# Patient Record
Sex: MALE | Race: WHITE | NOT HISPANIC OR LATINO | Employment: OTHER | URBAN - NONMETROPOLITAN AREA
[De-identification: names, ages, dates, MRNs, and addresses within clinical notes are randomized per-mention and may not be internally consistent; named-entity substitution may affect disease eponyms.]

---

## 2017-01-12 ENCOUNTER — OFFICE VISIT (OUTPATIENT)
Dept: CARDIAC SURGERY | Facility: CLINIC | Age: 82
End: 2017-01-12

## 2017-01-12 VITALS
SYSTOLIC BLOOD PRESSURE: 177 MMHG | HEART RATE: 88 BPM | DIASTOLIC BLOOD PRESSURE: 95 MMHG | BODY MASS INDEX: 25.46 KG/M2 | TEMPERATURE: 97.6 F | OXYGEN SATURATION: 99 % | WEIGHT: 168 LBS | HEIGHT: 68 IN

## 2017-01-12 DIAGNOSIS — Z09 FOLLOW-UP SURGERY CARE: ICD-10-CM

## 2017-01-12 DIAGNOSIS — F17.218 NICOTINE DEPENDENCE, CIGARETTES, WITH OTHER NICOTINE-INDUCED DISORDERS: Primary | ICD-10-CM

## 2017-01-12 DIAGNOSIS — I65.23 CAROTID STENOSIS, BILATERAL: ICD-10-CM

## 2017-01-12 PROCEDURE — 99024 POSTOP FOLLOW-UP VISIT: CPT | Performed by: NURSE PRACTITIONER

## 2017-01-12 NOTE — MR AVS SNAPSHOT
Maciej Funk   1/12/2017 9:40 AM   Office Visit    Dept Phone:  [phone]   Encounter #:  [id number]    Provider:  CHRISTINA Denny   Department:  [facility] CARDIOTHORACIC AND VASCULAR SURGERY                Your Full Care Plan              Today's Medication Changes          These changes are accurate as of: 1/12/17  9:59 AM.  If you have any questions, ask your nurse or doctor.               Stop taking medication(s)listed here:     lidocaine-hydrocortisone 3-0.5 % cream rectal cream   Stopped by:  CHRISTINA Denny                      Your Updated Medication List          This list is accurate as of: 1/12/17  9:59 AM.  Always use your most recent med list.                aspirin 81 MG chewable tablet       clopidogrel 75 MG tablet   Commonly known as:  PLAVIX   Take 1 tablet by mouth Daily.       latanoprost 0.005 % ophthalmic solution   Commonly known as:  XALATAN       lisinopril 20 MG tablet   Commonly known as:  PRINIVIL,ZESTRIL   Take 1 tablet by mouth Daily.       simvastatin 20 MG tablet   Commonly known as:  ZOCOR   Take 1 tablet by mouth Every Night. Bedtime       VISION-HECTOR PRESERVE PO               You Were Diagnosed With        Codes Comments    Nicotine dependence, cigarettes, with other nicotine-induced disorders    -  Primary ICD-10-CM: F17.218  ICD-9-CM: 292.89     Carotid stenosis, bilateral     ICD-10-CM: I65.23  ICD-9-CM: 433.10, 433.30     Follow-up surgery care     ICD-10-CM: Z09  ICD-9-CM: V67.00       Instructions    If you should experience any neurological symptoms including but not limited to visual or speech disturbances confusion, seizures, or weakness of limbs of one side of your body notify Heart and Vascular center immediately for evaluation or if after hours present to the nearest Emergency Department.      Clean operative site with antibacterial soap/water, pat dry. Keep open to air unless draining, then may  apply dry dressing.  No ointments or creams unless prescribed by provider.     Signs and symptoms of infection including drainage from operative site, redness, swelling, with associated fever and/or chills notify Heart and Vascular center immediately for wound check.      Smoking cessation assistance options offered including behavioral counseling (Smoking Cessation Classes), Nicotine replacement therapy (patches or gum), pharmacologic therapy (Chantix, Wellbutrin).  Discussion and question answer period 5-7 minutes.     MED MGMT: Continue ASA,PLAVIX,STATIN    RETURN 6 WEEKS WITH DUPLEX     Patient Instructions History      Upcoming Appointments     Visit Type Date Time Department    FOLLOW UP 1/12/2017  9:40 AM MGW CT VAS SURGERY [facility] VASCULAR [facility] 2/27/2017  1:30 PM MGW CT VAS SURGERY [facility]    FOLLOW UP 2/27/2017  2:20 PM MGW CT VAS SURGERY [facility]    PHYSICAL 10/5/2017 11:00 AM [facility] FAM MED [facility] 4TH    OFFICE VISIT 1/4/2018  1:00 PM [facility] OPHTHALMOLOGY [facility]      MyChart Signup     Our records indicate that you have declined [facility] myTomorrowst signup. If you would like to sign up for [employer], please email [email or web address] [email or web address] or call [phone] to obtain an activation code.             Other Info from Your Visit           Your Appointments     Feb 27, 2017  1:30 PM CST   VASCULAR ULTRASOUND VISIT with K[facility] VAS ROOM   [facility] CARDIOTHORACIC AND VASCULAR SURGERY (--)    [address] [place] [address] [address]  [address] [place] [id number] [phone]            Feb 27, 2017  2:20 PM CST   Follow Up with Lan Begum MD   [facility] CARDIOTHORACIC AND VASCULAR SURGERY (--)    [address] [place] [address] [address]  [address] [place] [id number] [phone]           Arrive 15 minutes prior to appointment.            Oct 05, 2017 11:00 AM CDT   Physical with Debbi Jackson MD   [facility] FAMILY MEDICINE (--)    [address] [place] [address] Dr  Medical  "[address] [facility] [address] [phone]           Arrive 15 minutes prior to appointment.            Jan 04, 2018  1:00 PM CST   Office Visit with Jamison Polk MD   [facility] OPHTHALMOLOGY (--)    [address] [place] [address] Dr  [address] [facility] [address] [phone]           Arrive 15 minutes prior to appointment.              Allergies     No Known Allergies      Reason for Visit     Carotid Artery Disease 1 week follow up (1/4/17) Left CEA      Vital Signs     Blood Pressure Pulse Temperature Height Weight Oxygen Saturation    177/95 (BP Location: Left arm) 88 97.6 °F (36.4 °C) (Oral) 68\" (172.7 cm) 168 lb (76.2 kg) 99%    Body Mass Index Smoking Status                25.54 kg/m2 Former Smoker          Problems and Diagnoses Noted     Narrowing of artery in neck    Encounter for examination following surgery    Nicotine dependence, cigarettes, with other nicotine-induced disorders        "

## 2017-01-12 NOTE — PATIENT INSTRUCTIONS
If you should experience any neurological symptoms including but not limited to visual or speech disturbances confusion, seizures, or weakness of limbs of one side of your body notify Heart and Vascular center immediately for evaluation or if after hours present to the nearest Emergency Department.      Clean operative site with antibacterial soap/water, pat dry. Keep open to air unless draining, then may apply dry dressing.  No ointments or creams unless prescribed by provider.     Signs and symptoms of infection including drainage from operative site, redness, swelling, with associated fever and/or chills notify Heart and Vascular center immediately for wound check.      Smoking cessation assistance options offered including behavioral counseling (Smoking Cessation Classes), Nicotine replacement therapy (patches or gum), pharmacologic therapy (Chantix, Wellbutrin).  Discussion and question answer period 5-7 minutes.     MED MGMT: Continue ASA,PLAVIX,STATIN    RETURN 6 WEEKS WITH DUPLEX

## 2017-01-13 NOTE — PROGRESS NOTES
Subjective   Patient ID: Maciej Funk is a 81 y.o. male is here today for follow-up CAROTID STENOSIS, LCEA.    History of Present Illness  The following portions of the patient's history were reviewed and updated as appropriate: allergies, current medications, past family history, past medical history, past social history, past surgical history and problem list.  PCP:  Debbi Jackson MD    81 y.o. male with HTN, AAA, COPD, dyslipidemia, carotid stensosi.  smokes 1 PPD.  Bilateral carotid bruits noted on exam.  Unable to see out of LEFT eye x years.  Mild dizziness occasional x years.  Activity level good, mows yard, rakes leaves.  Plavix started for severe carotid stenosis on duplex imaging.  No other associated symptoms or modifying factors.    11/9/2016 Carotid Duplex:  AISHA 50-79% (127cm/s), antegrade vert.  LICA 80-99% (522cm/s), antegrade vert.  12/9/2016 CTACarotids:  AISHA 50%, LICA 95%  1/4/17: LEFT CEA    Current Outpatient Prescriptions:   •  aspirin 81 MG chewable tablet, Chew 81 mg Daily., Disp: , Rfl:   •  clopidogrel (PLAVIX) 75 MG tablet, Take 1 tablet by mouth Daily., Disp: 30 tablet, Rfl: 5  •  latanoprost (XALATAN) 0.005 % ophthalmic solution, Administer 1 drop into the left eye every night. Bedtime, Disp: , Rfl:   •  lisinopril (PRINIVIL,ZESTRIL) 20 MG tablet, Take 1 tablet by mouth Daily., Disp: 90 tablet, Rfl: 3  •  Multiple Vitamins-Minerals (VISION-HECTOR PRESERVE PO), Take 1 capsule by mouth daily. PreserVision Lutein 226 mg-200 unit-5 mg-0.8 mg capsule, Disp: , Rfl:   •  simvastatin (ZOCOR) 20 MG tablet, Take 1 tablet by mouth Every Night. Bedtime, Disp: 90 tablet, Rfl: 3    Review of Systems   Constitution: Negative for fever and weakness.   HENT: Negative for hoarse voice and odynophagia.    Eyes: Negative for visual disturbance.   Cardiovascular: Negative for dyspnea on exertion and leg swelling.   Respiratory: Negative for shortness of breath.    Skin: Negative for poor wound  healing.   Musculoskeletal: Negative for falls.   Gastrointestinal: Negative for abdominal pain and constipation.   Genitourinary: Negative for dysuria.   Neurological: Negative for focal weakness and light-headedness.   All other systems reviewed and are negative.       Objective   Physical Exam   Constitutional: He is oriented to person, place, and time. He appears well-developed.   HENT:   Head: Normocephalic.   Eyes: Pupils are equal, round, and reactive to light.   Neck: Normal range of motion. Carotid bruit is not present.   Cardiovascular: Normal rate.    Pulmonary/Chest: Effort normal.   Abdominal: Soft. Bowel sounds are normal. He exhibits no distension.   Musculoskeletal: Normal range of motion.   Neurological: He is alert and oriented to person, place, and time. He has normal strength. No cranial nerve deficit.   Skin: Skin is warm and dry. No erythema.   LEFT neck incision: CDI, Well approximated.   Vitals reviewed.     Admission on 01/04/2017, Discharged on 01/05/2017   Component Date Value Ref Range Status   • Sodium 12/30/2016 143  137 - 145 mmol/L Final   • Potassium 12/30/2016 4.0  3.5 - 5.1 mmol/L Final   • Chloride 12/30/2016 103  95 - 110 mmol/L Final   • CO2 12/30/2016 28  22 - 31 mmol/L Final   • Anion Gap 12/30/2016 12.0  5.0 - 15.0 mmol/L Final   • Glucose 12/30/2016 91  60 - 100 mg/dl Final   • BUN 12/30/2016 23* 7 - 21 mg/dl Final   • Creatinine 12/30/2016 1.1  0.7 - 1.3 mg/dl Final   • GFR MDRD Non  12/30/2016 65  42 - 98 mL/min/1.73 sq.M Final    Comment: Invalid if creatinine is changing or the patient is on dialysis. Use AA  result if patient is -American, non AA result otherwise.     • GFR MDRD  12/30/2016 78  42 - 98 mL/min/1.73 sq.M Final   • Calcium 12/30/2016 9.0  8.4 - 10.2 mg/dl Final   • Total Protein 12/30/2016 7.1  6.3 - 8.6 gm/dl Final   • Albumin 12/30/2016 3.8  3.4 - 4.8 gm/dl Final   • Total Bilirubin 12/30/2016 1.0  0.2 - 1.3 mg/dl  Final   • Alkaline Phosphatase 12/30/2016 68  38 - 126 U/L Final   • AST (SGOT) 12/30/2016 20  17 - 59 U/L Final   • ALT (SGPT) 12/30/2016 40  21 - 72 U/L Final   • Protime 12/30/2016 13.4  11.1 - 15.3 seconds Final   • INR 12/30/2016 1.0  0.8 - 1.2 Final    Comment: Therapeutic range for most indications is 2.0 - 3.0 INR or 2.5 - 3.5 for  mechanical   heart valves.     • PTT 12/30/2016 35.4  20.0 - 40.3 seconds Final    The recommended Heparin therapeutic range is 68 - 97 seconds.   • Prealbumin 12/30/2016 28.1  17.6 - 36.0 mg/dl Final   • DOES A PREVIOUS ABORH EXIST? 12/30/2016 PATIENT NEEDS ABO/RH CONF ON DAY OF SURGERY   Final   • ABO Type 12/30/2016 A   Final   • RH type 12/30/2016 POS   Final   • Antibody Screen Interpretation 12/30/2016 NEG   Final   • DOES A PREVIOUS ABORH EXIST? 01/04/2017 PREVIOUS TYPE ON FILE   Final   • ABO Type 01/04/2017 A   Final   • RH type 01/04/2017 POS   Final   • Antibody Screen Interpretation 01/04/2017 NEG   Final   • Spec Descr 1 01/04/2017 SPECIMEN(S): A PLAQUE   Final         Assessment/Plan   Independent Review of Radiographic Studies:    Detailed discussion regarding risks, benefits, and treatment plan.  Patient understands, agrees, and wishes to proceed with plan.  Progressing well.     1. Nicotine dependence, cigarettes, with other nicotine-induced disorders  Smoking cessation assistance options offered including behavioral counseling (Smoking Cessation Classes), Nicotine replacement therapy (patches or gum), pharmacologic therapy (Chantix, Wellbutrin).  Discussion and question answer period 5-7 minutes.     2. Carotid stenosis, bilateral  Neurovasc status stable s/p LCEA.   - Duplex Carotid - Left Ultrasound CAR; Future    3. Follow-up surgery care  Signs and symptoms of infection including drainage from operative site, redness, swelling, with associated fever and/or chills notify Heart and Vascular center immediately for wound check.    Clean operative site with  antibacterial soap/water, pat dry. Keep open to air unless draining, then may apply dry dressing.  No ointments or creams unless prescribed by provider.   May drive. Return 6 weeks.   - Duplex Carotid - Left Ultrasound CAR; Future (6weeks)

## 2017-02-01 RX ORDER — LATANOPROST 50 UG/ML
SOLUTION/ DROPS OPHTHALMIC
Qty: 2.5 ML | Refills: 6 | Status: SHIPPED | OUTPATIENT
Start: 2017-02-01 | End: 2018-01-30 | Stop reason: SDUPTHER

## 2017-02-27 ENCOUNTER — OFFICE VISIT (OUTPATIENT)
Dept: CARDIAC SURGERY | Facility: CLINIC | Age: 82
End: 2017-02-27

## 2017-02-27 VITALS
BODY MASS INDEX: 25.9 KG/M2 | HEART RATE: 75 BPM | SYSTOLIC BLOOD PRESSURE: 122 MMHG | HEIGHT: 67 IN | WEIGHT: 165 LBS | DIASTOLIC BLOOD PRESSURE: 74 MMHG | OXYGEN SATURATION: 99 %

## 2017-02-27 DIAGNOSIS — I65.23 CAROTID STENOSIS, BILATERAL: Primary | ICD-10-CM

## 2017-02-27 DIAGNOSIS — I71.40 ABDOMINAL AORTIC ANEURYSM (AAA) WITHOUT RUPTURE (HCC): ICD-10-CM

## 2017-02-27 DIAGNOSIS — I10 ESSENTIAL HYPERTENSION: ICD-10-CM

## 2017-02-27 DIAGNOSIS — E78.2 MIXED HYPERLIPIDEMIA: ICD-10-CM

## 2017-02-27 PROCEDURE — 99406 BEHAV CHNG SMOKING 3-10 MIN: CPT | Performed by: THORACIC SURGERY (CARDIOTHORACIC VASCULAR SURGERY)

## 2017-02-27 PROCEDURE — 99024 POSTOP FOLLOW-UP VISIT: CPT | Performed by: THORACIC SURGERY (CARDIOTHORACIC VASCULAR SURGERY)

## 2017-03-04 NOTE — PROGRESS NOTES
2/27/2017    Maciej Arauzn Blessing  1935      Chief Complaint:  Carotid Stenosis  HPI:      PCP:  Debbi Jackson MD    81 y.o. male with HTN, AAA, COPD, dyslipidemia, carotid stensosi.  smokes 1 PPD.  Bilateral carotid bruits noted on exam.  Unable to see out of LEFT eye x years.  Mild dizziness occasional x years.  Activity level good, mows yard, rakes leaves.  Plavix started for severe carotid stenosis on duplex imaging.  Recovering well from CEA.  No other associated symptoms or modifying factors.    11/9/2016 Carotid Duplex:  AISHA 50-79% (127cm/s), antegrade vert.  LICA 80-99% (522cm/s), antegrade vert.  12/9/2016 CTA Carotids:  AISHA 50%, LICA 95%  1/4/2017 LEFT CEA  2/27/2017 Carotid Duplex:  LICA normal (77cm/s), antegrade vert.    The following portions of the patient's history were reviewed and updated as appropriate: allergies, current medications, past family history, past medical history, past social history, past surgical history and problem list.  Recent images independently reviewed.  Available laboratory values reviewed.    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    • Encounter for immunization    • Encounter for screening for malignant neoplasm of colon    • Essential hypertension    • H/O emphysema      still smoking   • Herpes zoster    • History of adenomatous polyp of colon      No adenomatous polyps on cscope    • History of colon polyps    • Hyperlipidemia    • Knee pain    • Leg pain      left   • Low back pain    • Nonexudative age-related macular degeneration    • Nuclear cataract    • Optic atrophy       OS, stable   • 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 PreserVision Lutein 226 mg-200 unit-5 mg-0.8 mg capsule, Disp: , Rfl:   •  simvastatin (ZOCOR) 20 MG tablet, Take 1 tablet by mouth Every Night. Bedtime, Disp: 90 tablet, Rfl: 3    Review of Systems   Constitutional: Negative for appetite change and fatigue.   Respiratory: Negative for cough, shortness of breath and wheezing.    Cardiovascular: Negative for chest pain, palpitations and leg swelling.   Musculoskeletal: Negative for arthralgias, back pain and neck pain, Leg pain.   Skin: Negative for color change and rash.   Neurological: Negative for dizziness, seizures, syncope, numbness and headaches.   Hematological: Negative for adenopathy. Does not bruise/bleed easily.   Psychiatric/Behavioral: Negative for confusion and hallucinations.  not nervous/anxious.      Physical Exam   Constitutional: Appears healthy, looks well, no acute distress. oriented to person, place, and time.   Cardiovascular: Normal rate and regular rhythm. no  Murmur   Pulses:       Carotid pulses are present on the right side with soft bruit, and on the left side with harsh bruit.       Radial pulses are 2+ on the right side, and 2+ on the left side.        Dorsalis pedis pulses are 1+ on the right side, and 1+ on the left side.        Posterior tibial pulses are 1+ on the right side, and 1+ on the left side.   Pulmonary/Chest: No respiratory distress. no wheezes.  no rhonchi. effort normal.  Musculoskeletal: Normal range of motion. Gait normal.  Neurological: alert and oriented to person, place, and time. normal strength. distal motor function intact.  Skin: Skin is warm and dry. No  cyanosis or erythema. No pallor. incision healing well.  Psychiatric:  normal mood and affect. Judgment and thought content normal.   Results for MACIEJ PEÑA (MRN [id number]) as of 1/9/2017 08:10   Ref. Range 12/30/2016 14:14   Creatinine Latest Ref Range: 0.7 - 1.3 mg/dl 1.1   BUN Latest Ref Range: 7 - 21 mg/dl 23 (H)     ASSESSMENT:  Maciej was seen today for carotid artery disease.    Diagnoses and all orders for this visit:    Carotid stenosis, bilateral  -     Duplex Carotid Ultrasound CAR; Future    Abdominal aortic aneurysm (AAA) without rupture    Essential hypertension    Mixed hyperlipidemia    PLAN:  Detailed discussion with Mr Peña regarding situation and options.  Carotid stenosis.  Multiple risk factors with severe comorbidities.  Risks, benefits discussed.  Understands and wishes to proceed with plan.    Return in 6 months with Carotid Duplex    Stop Plavix. Continue aspirin.  Smoking cessation advised and assistance options offered including behavioral counseling ([employer] Smoking Cessation Classes), Nicotine replacement therapy (patches or gum), pharmacologic therapy (Chantix, Wellbutrin). patient understands that continued use of tobacco products increases her risk of MI, CVA, PAD, cancer; counseling for 3-5min.  Recommended regular physical activity, progressive walking program.  Continue current medications as directed.    Thank you for the opportunity to participate in this patient's care.    Copy to primary care provider.    EMR Dragon/Transcription disclaimer:   Much of this encounter note is an electronic transcription/translation of spoken language to printed text. The electronic translation of spoken language may permit erroneous, or at times, nonsensical words or phrases to be inadvertently transcribed; Although I have reviewed the note for such errors, some may still exist.

## 2017-04-24 ENCOUNTER — APPOINTMENT (OUTPATIENT)
Dept: CT IMAGING | Facility: HOSPITAL | Age: 82
End: 2017-04-24

## 2017-04-24 ENCOUNTER — HOSPITAL ENCOUNTER (EMERGENCY)
Facility: HOSPITAL | Age: 82
Discharge: HOME | End: 2017-04-24
Attending: FAMILY MEDICINE | Admitting: NURSE PRACTITIONER

## 2017-04-24 VITALS
SYSTOLIC BLOOD PRESSURE: 179 MMHG | DIASTOLIC BLOOD PRESSURE: 94 MMHG | WEIGHT: 185 LBS | BODY MASS INDEX: 29.03 KG/M2 | OXYGEN SATURATION: 94 % | TEMPERATURE: 98 F | RESPIRATION RATE: 20 BRPM | HEART RATE: 104 BPM | HEIGHT: 67 IN

## 2017-04-24 DIAGNOSIS — IMO0002 LACERATION: Primary | ICD-10-CM

## 2017-04-24 PROCEDURE — 70450 CT HEAD/BRAIN W/O DYE: CPT

## 2017-04-24 PROCEDURE — 72125 CT NECK SPINE W/O DYE: CPT

## 2017-04-24 PROCEDURE — 99283 EMERGENCY DEPT VISIT LOW MDM: CPT

## 2017-05-01 ENCOUNTER — OFFICE VISIT (OUTPATIENT)
Dept: FAMILY MEDICINE CLINIC | Facility: CLINIC | Age: 82
End: 2017-05-01

## 2017-05-01 VITALS
WEIGHT: 164.5 LBS | SYSTOLIC BLOOD PRESSURE: 170 MMHG | BODY MASS INDEX: 25.82 KG/M2 | HEIGHT: 67 IN | DIASTOLIC BLOOD PRESSURE: 90 MMHG | HEART RATE: 97 BPM

## 2017-05-01 DIAGNOSIS — S01.112D: Primary | ICD-10-CM

## 2017-05-01 PROCEDURE — 99212 OFFICE O/P EST SF 10 MIN: CPT | Performed by: GENERAL PRACTICE

## 2017-10-05 ENCOUNTER — OFFICE VISIT (OUTPATIENT)
Dept: FAMILY MEDICINE CLINIC | Facility: CLINIC | Age: 82
End: 2017-10-05

## 2017-10-05 ENCOUNTER — LAB (OUTPATIENT)
Dept: LAB | Facility: HOSPITAL | Age: 82
End: 2017-10-05

## 2017-10-05 VITALS
HEIGHT: 67 IN | SYSTOLIC BLOOD PRESSURE: 140 MMHG | BODY MASS INDEX: 25.07 KG/M2 | OXYGEN SATURATION: 96 % | DIASTOLIC BLOOD PRESSURE: 80 MMHG | HEART RATE: 79 BPM | WEIGHT: 159.7 LBS

## 2017-10-05 DIAGNOSIS — IMO0002 NUCLEAR CATARACT: ICD-10-CM

## 2017-10-05 DIAGNOSIS — I65.23 CAROTID STENOSIS, BILATERAL: ICD-10-CM

## 2017-10-05 DIAGNOSIS — I10 ESSENTIAL HYPERTENSION: ICD-10-CM

## 2017-10-05 DIAGNOSIS — H47.20 OPTIC ATROPHY: ICD-10-CM

## 2017-10-05 DIAGNOSIS — E78.2 MIXED HYPERLIPIDEMIA: ICD-10-CM

## 2017-10-05 DIAGNOSIS — I10 ESSENTIAL HYPERTENSION: Primary | ICD-10-CM

## 2017-10-05 LAB
ALBUMIN SERPL-MCNC: 4.4 G/DL (ref 3.4–4.8)
ALBUMIN/GLOB SERPL: 1.4 G/DL (ref 1.1–1.8)
ALP SERPL-CCNC: 66 U/L (ref 38–126)
ALT SERPL W P-5'-P-CCNC: 17 U/L (ref 21–72)
ANION GAP SERPL CALCULATED.3IONS-SCNC: 11 MMOL/L (ref 5–15)
ARTICHOKE IGE QN: 109 MG/DL (ref 1–129)
AST SERPL-CCNC: 50 U/L (ref 17–59)
BACTERIA UR QL AUTO: NORMAL /HPF
BILIRUB SERPL-MCNC: 1.6 MG/DL (ref 0.2–1.3)
BILIRUB UR QL STRIP: NEGATIVE
BUN BLD-MCNC: 21 MG/DL (ref 7–21)
BUN/CREAT SERPL: 19.6 (ref 7–25)
CALCIUM SPEC-SCNC: 9.5 MG/DL (ref 8.4–10.2)
CHLORIDE SERPL-SCNC: 100 MMOL/L (ref 95–110)
CHOLEST SERPL-MCNC: 166 MG/DL (ref 0–199)
CLARITY UR: CLEAR
CO2 SERPL-SCNC: 28 MMOL/L (ref 22–31)
COLOR UR: YELLOW
CREAT BLD-MCNC: 1.07 MG/DL (ref 0.7–1.3)
GFR SERPL CREATININE-BSD FRML MDRD: 66 ML/MIN/1.73 (ref 42–98)
GLOBULIN UR ELPH-MCNC: 3.2 GM/DL (ref 2.3–3.5)
GLUCOSE BLD-MCNC: 92 MG/DL (ref 60–100)
GLUCOSE UR STRIP-MCNC: NEGATIVE MG/DL
HDLC SERPL-MCNC: 40 MG/DL (ref 60–200)
HGB UR QL STRIP.AUTO: NEGATIVE
HYALINE CASTS UR QL AUTO: NORMAL /LPF
KETONES UR QL STRIP: NEGATIVE
LDLC/HDLC SERPL: 2.66 {RATIO} (ref 0–3.55)
LEUKOCYTE ESTERASE UR QL STRIP.AUTO: NEGATIVE
NITRITE UR QL STRIP: NEGATIVE
PH UR STRIP.AUTO: <=5 [PH] (ref 5–9)
POTASSIUM BLD-SCNC: 4.2 MMOL/L (ref 3.5–5.1)
PROT SERPL-MCNC: 7.6 G/DL (ref 6.3–8.6)
PROT UR QL STRIP: NEGATIVE
RBC # UR: NORMAL /HPF
REF LAB TEST METHOD: NORMAL
SODIUM BLD-SCNC: 139 MMOL/L (ref 137–145)
SP GR UR STRIP: 1.01 (ref 1–1.03)
SQUAMOUS #/AREA URNS HPF: NORMAL /HPF
TRIGL SERPL-MCNC: 99 MG/DL (ref 20–199)
UROBILINOGEN UR QL STRIP: NORMAL
WBC UR QL AUTO: NORMAL /HPF

## 2017-10-05 PROCEDURE — 81001 URINALYSIS AUTO W/SCOPE: CPT | Performed by: GENERAL PRACTICE

## 2017-10-05 PROCEDURE — 80061 LIPID PANEL: CPT | Performed by: GENERAL PRACTICE

## 2017-10-05 PROCEDURE — 36415 COLL VENOUS BLD VENIPUNCTURE: CPT

## 2017-10-05 PROCEDURE — 99214 OFFICE O/P EST MOD 30 MIN: CPT | Performed by: GENERAL PRACTICE

## 2017-10-05 PROCEDURE — 80053 COMPREHEN METABOLIC PANEL: CPT | Performed by: GENERAL PRACTICE

## 2017-10-05 NOTE — PROGRESS NOTES
Subjective   Maciej Funk is a 82 y.o. male.     Chief Complaint   Patient presents with   • Annual Exam   • Hypertension   • Hyperlipidemia     For review and evaluation of management of chronic medical problems. Labs reviewed.   Hypertension   This is a chronic problem. The current episode started more than 1 year ago. The problem is unchanged. The problem is controlled. Pertinent negatives include no chest pain, headaches, neck pain, palpitations or shortness of breath. There are no associated agents to hypertension. Risk factors for coronary artery disease include dyslipidemia and smoking/tobacco exposure. Past treatments include ACE inhibitors. The current treatment provides significant improvement. There are no compliance problems.    Hyperlipidemia   This is a chronic problem. The current episode started more than 1 year ago. The problem is controlled. Recent lipid tests were reviewed and are normal. There are no known factors aggravating his hyperlipidemia. Pertinent negatives include no chest pain, myalgias or shortness of breath. Current antihyperlipidemic treatment includes statins. The current treatment provides significant improvement of lipids. There are no compliance problems.       The following portions of the patient's history were reviewed and updated as appropriate: allergies, current medications, past family and social history and problem list.    Outpatient Medications Prior to Visit   Medication Sig Dispense Refill   • aspirin 81 MG chewable tablet Chew 81 mg Daily.     • latanoprost (XALATAN) 0.005 % ophthalmic solution INSTILL 1 DROP IN LEFT EYE AT BEDTIME 2.5 mL 6   • Multiple Vitamins-Minerals (VISION-HECTOR PRESERVE PO) Take 1 capsule by mouth daily. PreserVision Lutein 226 mg-200 unit-5 mg-0.8 mg capsule     • lisinopril (PRINIVIL,ZESTRIL) 20 MG tablet Take 1 tablet by mouth Daily. 90 tablet 3   • simvastatin (ZOCOR) 20 MG tablet Take 1 tablet by mouth Every Night. Bedtime 90  "tablet 3     No facility-administered medications prior to visit.      Review of Systems   Constitutional: Negative.  Negative for chills, fatigue, fever and unexpected weight change.   HENT: Negative.  Negative for congestion, ear pain, hearing loss, nosebleeds, rhinorrhea, sneezing, sore throat and tinnitus.    Eyes: Negative.  Negative for discharge.   Respiratory: Negative.  Negative for cough, shortness of breath and wheezing.    Cardiovascular: Negative.  Negative for chest pain and palpitations.   Gastrointestinal: Negative.  Negative for abdominal pain, constipation, diarrhea, nausea and vomiting.   Endocrine: Negative.    Genitourinary: Negative.  Negative for dysuria, frequency and urgency.   Musculoskeletal: Negative.  Negative for arthralgias, back pain, joint swelling, myalgias and neck pain.   Skin: Negative.  Negative for rash.   Allergic/Immunologic: Negative.    Neurological: Negative.  Negative for dizziness, weakness, numbness and headaches.   Hematological: Negative.  Negative for adenopathy.   Psychiatric/Behavioral: Negative.  Negative for dysphoric mood and sleep disturbance. The patient is not nervous/anxious.        Objective     Visit Vitals   • /80   • Pulse 79   • Ht 67\" (170.2 cm)   • Wt 159 lb 11.2 oz (72.4 kg)   • SpO2 96%   • BMI 25.01 kg/m2     Physical Exam   Constitutional: He is oriented to person, place, and time. He appears well-developed and well-nourished. No distress.   HENT:   Head: Normocephalic and atraumatic.   Nose: Nose normal.   Mouth/Throat: Oropharynx is clear and moist.   Eyes: Conjunctivae and EOM are normal. Pupils are equal, round, and reactive to light. Right eye exhibits no discharge. Left eye exhibits no discharge.   Neck: Normal range of motion. No thyromegaly present.   Cardiovascular: Normal rate, regular rhythm, normal heart sounds and intact distal pulses.    No murmur heard.  Pulmonary/Chest: Effort normal and breath sounds normal. No respiratory " distress. He has no wheezes. He has no rales. He exhibits no tenderness.   Abdominal: Soft. Bowel sounds are normal. He exhibits no distension and no mass. There is no tenderness. No hernia.   Musculoskeletal: Normal range of motion. He exhibits no deformity.   Lymphadenopathy:     He has no cervical adenopathy.   Neurological: He is alert and oriented to person, place, and time. He has normal reflexes.   Skin: Skin is warm and dry. No rash noted. No pallor.   Psychiatric: He has a normal mood and affect. His behavior is normal. Judgment and thought content normal.        Assessment/Plan   Problem List Items Addressed This Visit        Cardiovascular and Mediastinum    Essential hypertension - Primary    Relevant Orders    Comprehensive Metabolic Panel (Completed)    Lipid Panel (Completed)    Urinalysis With Microscopic - Urine, Clean Catch (Completed)    Hyperlipidemia    Relevant Orders    Comprehensive Metabolic Panel (Completed)    Lipid Panel (Completed)    Urinalysis With Microscopic - Urine, Clean Catch (Completed)    Carotid stenosis, bilateral    Relevant Orders    Ambulatory Referral to Cardiovascular Surgery       Nervous and Auditory    Optic atrophy    Relevant Orders    Ambulatory Referral to Ophthalmology       Other    Nuclear cataract    Relevant Orders    Ambulatory Referral to Ophthalmology        Continue current treatment.     New Medications Ordered This Visit   Medications   • zoster vaccine live 19474 UNT/0.65ML reconstituted suspension     Sig: Inject 1 dose under the skin 1 (One) Time for 1 dose.     Dispense:  1 each     Refill:  0     Return in about 6 days (around 10/11/2017) for medicare wellness visit.

## 2017-10-11 ENCOUNTER — OFFICE VISIT (OUTPATIENT)
Dept: FAMILY MEDICINE CLINIC | Facility: CLINIC | Age: 82
End: 2017-10-11

## 2017-10-11 VITALS
SYSTOLIC BLOOD PRESSURE: 138 MMHG | DIASTOLIC BLOOD PRESSURE: 80 MMHG | HEART RATE: 86 BPM | BODY MASS INDEX: 25.41 KG/M2 | OXYGEN SATURATION: 99 % | HEIGHT: 67 IN | WEIGHT: 161.9 LBS

## 2017-10-11 DIAGNOSIS — Z00.00 MEDICARE ANNUAL WELLNESS VISIT, INITIAL: Primary | ICD-10-CM

## 2017-10-11 PROCEDURE — G0438 PPPS, INITIAL VISIT: HCPCS | Performed by: GENERAL PRACTICE

## 2017-10-11 RX ORDER — LISINOPRIL 20 MG/1
20 TABLET ORAL DAILY
Qty: 90 TABLET | Refills: 3 | Status: SHIPPED | OUTPATIENT
Start: 2017-10-11 | End: 2017-10-11 | Stop reason: SDUPTHER

## 2017-10-11 RX ORDER — SIMVASTATIN 20 MG
20 TABLET ORAL NIGHTLY
Qty: 90 TABLET | Refills: 3 | Status: SHIPPED | OUTPATIENT
Start: 2017-10-11 | End: 2017-10-11 | Stop reason: SDUPTHER

## 2017-10-11 RX ORDER — LISINOPRIL 20 MG/1
20 TABLET ORAL DAILY
Qty: 90 TABLET | Refills: 3 | Status: SHIPPED | OUTPATIENT
Start: 2017-10-11 | End: 2018-04-11 | Stop reason: SDUPTHER

## 2017-10-11 RX ORDER — SIMVASTATIN 20 MG
20 TABLET ORAL NIGHTLY
Qty: 90 TABLET | Refills: 3 | Status: SHIPPED | OUTPATIENT
Start: 2017-10-11 | End: 2018-04-11 | Stop reason: SDUPTHER

## 2017-10-11 NOTE — PATIENT INSTRUCTIONS
Medicare Wellness  Personal Prevention Plan of Service     Date of Office Visit:  10/11/2017  Encounter Provider:  Debbi Jackson MD  Place of Service:  [facility]  Patient Name: Maciej Fukn  :  1935    As part of the Medicare Wellness portion of your visit today, we are providing you with this personalized preventive plan of services (PPPS). This plan is based upon recommendations of the [place] Preventive Services Task Force (USPSTF) and the Advisory Committee on Immunization Practices (ACIP).    This lists the preventive care services that should be considered, and provides dates of when you are due. Items listed as completed are up-to-date and do not require any further intervention.    Health Maintenance   Topic Date Due   • TDAP/TD VACCINES (1 - Tdap) 10/05/2018 (Originally 3/12/1954)   • LIPID PANEL  10/05/2018   • MEDICARE ANNUAL WELLNESS  10/11/2018   • COLONOSCOPY  10/27/2020   • INFLUENZA VACCINE  Addressed   • PNEUMOCOCCAL VACCINES (65+ LOW/MEDIUM RISK)  Completed   • ZOSTER VACCINE  Completed       No orders of the defined types were placed in this encounter.      Return for Annual physical, medicare wellness visit.

## 2017-10-11 NOTE — PROGRESS NOTES
QUICK REFERENCE INFORMATION:  The ABCs of the Annual Wellness Visit    Initial Medicare Wellness Visit    HEALTH RISK ASSESSMENT    1935    Recent Hospitalizations:  Recently treated at the following:  [facility].    Current Medical Providers:  Patient Care Team:  Debbi Jackson MD as PCP - General    Smoking Status:  History   Smoking Status   • Current Every Day Smoker   • Years: 60.00   Smokeless Tobacco   • Former User     Alcohol Consumption:  History   Alcohol Use No     Depression Screen:   PHQ-2/PHQ-9 Depression Screening 10/11/2017   Little interest or pleasure in doing things 0   Feeling down, depressed, or hopeless 0   Trouble falling or staying asleep, or sleeping too much 0   Feeling tired or having little energy 0   Poor appetite or overeating 0   Feeling bad about yourself - or that you are a failure or have let yourself or your family down 0   Trouble concentrating on things, such as reading the newspaper or watching television 0   Moving or speaking so slowly that other people could have noticed. Or the opposite - being so fidgety or restless that you have been moving around a lot more than usual 0   Thoughts that you would be better off dead, or of hurting yourself in some way 0   Total Score 0       Health Habits and Functional and Cognitive Screening:  Functional & Cognitive Status 10/11/2017   Do you have difficulty preparing food and eating? No   Do you have difficulty bathing yourself? No   Do you have difficulty getting dressed? No   Do you have difficulty using the toilet? No   Do you have difficulty moving around from place to place? No   In the past year have you fallen or experienced a near fall? Yes   Do you need help using the phone?  No   Are you deaf or do you have serious difficulty hearing?  Yes   Do you need help with transportation? No   Do you need help shopping? No   Do you need help preparing meals?  No   Do you need help with housework?  No   Do you  need help with laundry? No   Do you need help taking your medications? No   Do you need help managing money? No   Do you have difficulty concentrating, remembering or making decisions? No       Health Habits  Current Diet: Well Balanced Diet  Dental Exam: Unknown  Eye Exam: Up to date  Exercise (times per week): 0 times per week    Does the patient have evidence of cognitive impairment? No    Asiprin use counseling: Taking ASA appropriately as indicated      Recent Lab Results:    Visual Acuity:  No exam data present    Age-appropriate Screening Schedule:  Refer to the list below for future screening recommendations based on patient's age, sex and/or medical conditions. Orders for these recommended tests are listed in the plan section. The patient has been provided with a written plan.    Health Maintenance   Topic Date Due   • TDAP/TD VACCINES (1 - Tdap) 10/05/2018 (Originally 3/12/1954)   • LIPID PANEL  10/05/2018   • COLONOSCOPY  10/27/2020   • INFLUENZA VACCINE  Addressed   • PNEUMOCOCCAL VACCINES (65+ LOW/MEDIUM RISK)  Completed   • ZOSTER VACCINE  Completed        Subjective   History of Present Illness    Maciej Funk is a 82 y.o. male who presents for an Annual Wellness Visit.    The following portions of the patient's history were reviewed and updated as appropriate: allergies, current medications, past family history, past medical history, past social history, past surgical history and problem list.    Outpatient Medications Prior to Visit   Medication Sig Dispense Refill   • aspirin 81 MG chewable tablet Chew 81 mg Daily.     • latanoprost (XALATAN) 0.005 % ophthalmic solution INSTILL 1 DROP IN LEFT EYE AT BEDTIME 2.5 mL 6   • Multiple Vitamins-Minerals (VISION-HECTOR PRESERVE PO) Take 1 capsule by mouth daily. PreserVision Lutein 226 mg-200 unit-5 mg-0.8 mg capsule     • lisinopril (PRINIVIL,ZESTRIL) 20 MG tablet Take 1 tablet by mouth Daily. 90 tablet 3   • simvastatin (ZOCOR) 20 MG tablet Take 1  tablet by mouth Every Night. Bedtime 90 tablet 3     No facility-administered medications prior to visit.        Patient Active Problem List   Diagnosis   • Abdominal aortic aneurysm   • Essential hypertension   • History of adenomatous polyp of colon   • Hyperlipidemia   • Tobacco dependence syndrome   • Panlobular emphysema   • Nicotine dependence, cigarettes, with other nicotine-induced disorders   • Carotid stenosis, bilateral   • Optic atrophy   • Nonexudative age-related macular degeneration   • Nuclear cataract   • Follow-up surgery care       Advance Care Planning:  power of  for healthcare on file, has an advance directive - a copy has been provided and is in file    Identification of Risk Factors:  Risk factors include: weight , unhealthy diet, tobacco use and increased fall risk.    Review of Systems    Compared to one year ago, the patient feels his physical health is the same.  Compared to one year ago, the patient feels his mental health is the same.    Objective     Physical Exam   Constitutional: He is oriented to person, place, and time. He appears well-developed and well-nourished. No distress.   HENT:   Head: Normocephalic.   Nose: Nose normal.   Mouth/Throat: Oropharynx is clear and moist.   Eyes: Conjunctivae and EOM are normal. Pupils are equal, round, and reactive to light. Right eye exhibits no discharge. Left eye exhibits no discharge.   Neck: No thyromegaly present.   Cardiovascular: Normal rate, regular rhythm, normal heart sounds and intact distal pulses.    No murmur heard.  Pulmonary/Chest: Effort normal and breath sounds normal.   Musculoskeletal: He exhibits no edema.   Lymphadenopathy:     He has no cervical adenopathy.   Neurological: He is alert and oriented to person, place, and time.   Skin: Skin is warm and dry.   Psychiatric: He has a normal mood and affect.   Nursing note and vitals reviewed.      Vitals:    10/11/17 1139   BP: 138/80   BP Location: Left arm   Patient  "Position: Sitting   Cuff Size: Adult   Pulse: 86   SpO2: 99%   Weight: 161 lb 14.4 oz (73.4 kg)   Height: 67\" (170.2 cm)   PainSc: 0-No pain       Body mass index is 25.36 kg/(m^2).  Discussed the patient's BMI with him. The BMI is in the acceptable range.    Assessment/Plan   Patient Self-Management and Personalized Health Advice  The patient has been provided with information about: diet, exercise, weight management, tobacco cessation and fall prevention and preventive services including:   · Exercise counseling provided, Fall Risk assessment done, Fall Risk plan of care done, Influenza vaccine, Nutrition counseling provided, Smoking cessation counseling completed.    Visit Diagnoses:    ICD-10-CM ICD-9-CM   1. Medicare annual wellness visit, initial Z00.00 V70.0       No orders of the defined types were placed in this encounter.      Outpatient Encounter Prescriptions as of 10/11/2017   Medication Sig Dispense Refill   • aspirin 81 MG chewable tablet Chew 81 mg Daily.     • latanoprost (XALATAN) 0.005 % ophthalmic solution INSTILL 1 DROP IN LEFT EYE AT BEDTIME 2.5 mL 6   • lisinopril (PRINIVIL,ZESTRIL) 20 MG tablet Take 1 tablet by mouth Daily. 90 tablet 3   • Multiple Vitamins-Minerals (VISION-HECTOR PRESERVE PO) Take 1 capsule by mouth daily. PreserVision Lutein 226 mg-200 unit-5 mg-0.8 mg capsule     • simvastatin (ZOCOR) 20 MG tablet Take 1 tablet by mouth Every Night. Bedtime 90 tablet 3   • [DISCONTINUED] lisinopril (PRINIVIL,ZESTRIL) 20 MG tablet Take 1 tablet by mouth Daily. 90 tablet 3   • [DISCONTINUED] simvastatin (ZOCOR) 20 MG tablet Take 1 tablet by mouth Every Night. Bedtime 90 tablet 3     No facility-administered encounter medications on file as of 10/11/2017.        Reviewed use of high risk medication in the elderly: not applicable  Reviewed for potential of harmful drug interactions in the elderly: not applicable    Follow Up:  Return in about 6 months (around 4/11/2018) for Recheck.     An After " Visit Summary and PPPS with all of these plans were given to the patient.   Information has been scanned into chart. Discussed importance of taking medications as prescribed. Encouraged healthy eating habits with low fat, low salt choices and working towards maintaining a healthy weight. Recommended regular exercise if able as well as care to prevent falls.

## 2017-11-20 ENCOUNTER — OFFICE VISIT (OUTPATIENT)
Dept: CARDIAC SURGERY | Facility: CLINIC | Age: 82
End: 2017-11-20

## 2017-11-20 VITALS
DIASTOLIC BLOOD PRESSURE: 80 MMHG | HEART RATE: 92 BPM | WEIGHT: 164.1 LBS | OXYGEN SATURATION: 98 % | HEIGHT: 67 IN | BODY MASS INDEX: 25.75 KG/M2 | SYSTOLIC BLOOD PRESSURE: 138 MMHG

## 2017-11-20 DIAGNOSIS — F17.218 NICOTINE DEPENDENCE, CIGARETTES, WITH OTHER NICOTINE-INDUCED DISORDERS: ICD-10-CM

## 2017-11-20 DIAGNOSIS — E78.2 MIXED HYPERLIPIDEMIA: ICD-10-CM

## 2017-11-20 DIAGNOSIS — J43.1 PANLOBULAR EMPHYSEMA (HCC): Primary | ICD-10-CM

## 2017-11-20 DIAGNOSIS — I71.40 ABDOMINAL AORTIC ANEURYSM (AAA) WITHOUT RUPTURE (HCC): ICD-10-CM

## 2017-11-20 DIAGNOSIS — I65.23 BILATERAL CAROTID ARTERY STENOSIS: Primary | ICD-10-CM

## 2017-11-20 DIAGNOSIS — I10 ESSENTIAL HYPERTENSION: ICD-10-CM

## 2017-11-20 DIAGNOSIS — I65.23 CAROTID STENOSIS, BILATERAL: ICD-10-CM

## 2017-11-20 PROCEDURE — 99214 OFFICE O/P EST MOD 30 MIN: CPT | Performed by: THORACIC SURGERY (CARDIOTHORACIC VASCULAR SURGERY)

## 2017-11-20 PROCEDURE — 99406 BEHAV CHNG SMOKING 3-10 MIN: CPT | Performed by: THORACIC SURGERY (CARDIOTHORACIC VASCULAR SURGERY)

## 2017-11-20 NOTE — PROGRESS NOTES
11/20/2017    Maciej Funk  1935    Chief Complaint:    Chief Complaint   Patient presents with   • Carotid Artery Disease     6 mo f/u       HPI:      PCP:  Debbi Jackson MD  82 y.o. male with HTN, AAA, COPD, dyslipidemia, carotid stensosi.  smokes 1 PPD.  Bilateral carotid bruits noted on exam.  Unable to see out of LEFT eye x years.  Mild dizziness occasional x years.  Activity level good, mows yard, rakes leaves.  Plavix started for severe carotid stenosis on duplex imaging.  Recovering well from CEA.  No other associated symptoms or modifying factors.     11/9/2016 Carotid Duplex:  AISHA 50-79% (127cm/s), antegrade vert.  LICA 80-99% (522cm/s), antegrade vert.  12/9/2016 CTA Carotids:  AISHA 50%, LICA 95%  1/4/2017 LEFT CEA  2/27/2017 Carotid Duplex:  LICA normal (77cm/s), antegrade vert.  11/20/2017 Carotid Duplex:  AISHA 50-69% (99cm/s), antegrade vert.  LICA 0-49% (87cm/s), antegrade vert.       The following portions of the patient's history were reviewed and updated as appropriate: allergies, current medications, past family history, past medical history, past social history, past surgical history and problem list.  Recent images independently reviewed.  Available laboratory values reviewed.    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    • Encounter for immunization    • Encounter for screening for malignant neoplasm of colon    • Essential hypertension    • H/O emphysema     still smoking   • Herpes zoster    • History of adenomatous polyp of colon     No adenomatous polyps on cscope    • History of colon polyps    • Hyperlipidemia    • Knee pain    • Leg pain     left   • Low back pain    • Nonexudative age-related macular degeneration    • Nuclear cataract    • Optic atrophy      OS, stable   • 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 PreserVision Lutein 226 mg-200 unit-5 mg-0.8 mg capsule, Disp: , Rfl:   •  simvastatin (ZOCOR) 20 MG tablet, Take 1 tablet by mouth Every Night. Bedtime, Disp: 90 tablet, Rfl: 3    Review of Systems   Review of Systems   Constitution: Negative for weakness, malaise/fatigue and weight loss.   Cardiovascular: Negative for chest pain, claudication and dyspnea on exertion.   Respiratory: Negative for cough and shortness of breath.    Skin: Negative for color change and poor wound healing.   Neurological: Negative for dizziness and numbness.       Physical Exam   Physical Exam   Constitutional: He is oriented to person, place, and time. He is active and cooperative. He does not appear ill. No distress.   HENT:   Right Ear: Hearing normal.   Left Ear: Hearing normal.   Nose: No nasal deformity. No epistaxis.   Mouth/Throat: He does not have dentures. Normal dentition.   Cardiovascular: Normal rate and regular rhythm.    No murmur heard.  Pulses:       Carotid pulses are 2+ on the right side, and 2+ on the left side.       Radial pulses are 2+ on the right side, and 2+ on the left side.        Dorsalis pedis pulses are 1+ on the right side, and 1+ on the left side.        Posterior tibial pulses are 1+ on the right side, and 1+ on the left side.   Pulmonary/Chest: Effort normal and breath sounds normal.   Abdominal: Soft. He exhibits no distension and no mass. There is no tenderness.   Musculoskeletal:  He exhibits no deformity.   Gait normal.    Neurological: He is alert and oriented to person, place, and time. He has normal strength.   Skin: Skin is warm and dry. No cyanosis or erythema. No pallor.   No venous staining   Psychiatric: He has a normal mood and affect. His speech is normal. Judgment and thought content normal.     Results for MACIEJ PEÑA (MRN [id number]) as of 11/20/2017 16:16   Ref. Range 10/5/2017 12:05   Creatinine Latest Ref Range: 0.70 - 1.30 mg/dL 1.07   BUN Latest Ref Range: 7 - 21 mg/dL 21     ASSESSMENT:  Maciej was seen today for carotid artery disease.    Diagnoses and all orders for this visit:    Panlobular emphysema    Mixed hyperlipidemia    Essential hypertension    Carotid stenosis, bilateral  -     Duplex Carotid Ultrasound CAR; Future    Abdominal aortic aneurysm (AAA) without rupture    Nicotine dependence, cigarettes, with other nicotine-induced disorders    PLAN:  Detailed discussion with Mr Peña regarding situation and options.  moderate Carotid stenosis.  Multiple risk factors with severe comorbidities.  Risks, benefits discussed.  Understands and wishes to proceed with plan.     Return in 1 year with Carotid Duplex     Smoking cessation advised and assistance options offered including behavioral counseling ([employer]ess[employer] [facility]), Nicotine replacement therapy (patches or gum), pharmacologic therapy (Chantix, Wellbutrin). patient understands that continued use of tobacco products increases her risk of MI, CVA, PAD, cancer; counseling for 3-5min.  Return after above studies complete  Recommended regular physical activity, progressive walking program.  Continue current medications as directed.    Thank you for the opportunity to participate in this patient's care.    Copy to primary care provider.    EMR Dragon/Transcription disclaimer:   Much of this encounter note is an electronic transcription/translation of spoken language to printed text.  The electronic translation of spoken language may permit erroneous, or at times, nonsensical words or phrases to be inadvertently transcribed; Although I have reviewed the note for such errors, some may still exist.

## 2017-11-22 LAB
BH CV XLRA MEAS CAROTID RIGHT ICA/CCA DIASTOLIC RATIO: 3.1
BH CV XLRA MEAS LEFT DIST CCA EDV: 19 CM/SEC
BH CV XLRA MEAS LEFT DIST CCA PSV: 82 CM/SEC
BH CV XLRA MEAS LEFT DIST ICA EDV: 32 CM/SEC
BH CV XLRA MEAS LEFT DIST ICA PSV: 85 CM/SEC
BH CV XLRA MEAS LEFT ICA/CCA DIASTOLIC RATIO: 1.6
BH CV XLRA MEAS LEFT ICA/CCA RATIO: 1
BH CV XLRA MEAS LEFT MID ICA EDV: 30 CM/SEC
BH CV XLRA MEAS LEFT MID ICA PSV: 87 CM/SEC
BH CV XLRA MEAS LEFT PROX CCA EDV: 19 CM/SEC
BH CV XLRA MEAS LEFT PROX CCA PSV: 89 CM/SEC
BH CV XLRA MEAS LEFT PROX ECA EDV: 18 CM/SEC
BH CV XLRA MEAS LEFT PROX ECA PSV: 166 CM/SEC
BH CV XLRA MEAS LEFT PROX ICA EDV: 25 CM/SEC
BH CV XLRA MEAS LEFT PROX ICA PSV: 82 CM/SEC
BH CV XLRA MEAS LEFT VERTEBRAL A EDV: 36 CM/SEC
BH CV XLRA MEAS LEFT VERTEBRAL A PSV: 147 CM/SEC
BH CV XLRA MEAS RIGHT DIST CCA EDV: 19 CM/SEC
BH CV XLRA MEAS RIGHT DIST CCA PSV: 85 CM/SEC
BH CV XLRA MEAS RIGHT DIST ICA EDV: 30 CM/SEC
BH CV XLRA MEAS RIGHT DIST ICA PSV: 88 CM/SEC
BH CV XLRA MEAS RIGHT ICA/CCA RATIO: 0.9
BH CV XLRA MEAS RIGHT MID ICA EDV: 31 CM/SEC
BH CV XLRA MEAS RIGHT MID ICA PSV: 94 CM/SEC
BH CV XLRA MEAS RIGHT PROX CCA EDV: 10 CM/SEC
BH CV XLRA MEAS RIGHT PROX CCA PSV: 108 CM/SEC
BH CV XLRA MEAS RIGHT PROX ECA EDV: 0 CM/SEC
BH CV XLRA MEAS RIGHT PROX ECA PSV: 126 CM/SEC
BH CV XLRA MEAS RIGHT PROX ICA EDV: 32 CM/SEC
BH CV XLRA MEAS RIGHT PROX ICA PSV: 99 CM/SEC
BH CV XLRA MEAS RIGHT VERTEBRAL A EDV: 10 CM/SEC
BH CV XLRA MEAS RIGHT VERTEBRAL A PSV: 21 CM/SEC

## 2018-01-30 RX ORDER — LATANOPROST 50 UG/ML
1 SOLUTION/ DROPS OPHTHALMIC NIGHTLY
COMMUNITY

## 2018-02-01 ENCOUNTER — ANESTHESIA EVENT (OUTPATIENT)
Dept: PERIOP | Facility: HOSPITAL | Age: 83
End: 2018-02-01

## 2018-02-02 ENCOUNTER — ANESTHESIA (OUTPATIENT)
Dept: PERIOP | Facility: HOSPITAL | Age: 83
End: 2018-02-02

## 2018-02-02 ENCOUNTER — HOSPITAL ENCOUNTER (OUTPATIENT)
Facility: HOSPITAL | Age: 83
Setting detail: HOSPITAL OUTPATIENT SURGERY
Discharge: HOME | End: 2018-02-02
Attending: OPHTHALMOLOGY | Admitting: OPHTHALMOLOGY

## 2018-02-02 VITALS
BODY MASS INDEX: 24.5 KG/M2 | TEMPERATURE: 97.2 F | WEIGHT: 156.09 LBS | SYSTOLIC BLOOD PRESSURE: 131 MMHG | OXYGEN SATURATION: 98 % | DIASTOLIC BLOOD PRESSURE: 71 MMHG | RESPIRATION RATE: 20 BRPM | HEART RATE: 79 BPM | HEIGHT: 67 IN

## 2018-02-02 PROCEDURE — V2632 POST CHMBR INTRAOCULAR LENS: HCPCS | Performed by: OPHTHALMOLOGY

## 2018-02-02 PROCEDURE — 25010000002 VANCOMYCIN PER 500 MG: Performed by: OPHTHALMOLOGY

## 2018-02-02 PROCEDURE — 25010000002 EPINEPHRINE PER 0.1 MG: Performed by: OPHTHALMOLOGY

## 2018-02-02 PROCEDURE — 25010000002 FENTANYL CITRATE (PF) 100 MCG/2ML SOLUTION: Performed by: NURSE ANESTHETIST, CERTIFIED REGISTERED

## 2018-02-02 DEVICE — LENS ACRYSOF IQ 6X13MM SN60WF 20.0: Type: IMPLANTABLE DEVICE | Site: EYE | Status: FUNCTIONAL

## 2018-02-02 RX ORDER — PROMETHAZINE HYDROCHLORIDE 25 MG/ML
12.5 INJECTION, SOLUTION INTRAMUSCULAR; INTRAVENOUS ONCE AS NEEDED
Status: CANCELLED | OUTPATIENT
Start: 2018-02-02

## 2018-02-02 RX ORDER — TETRACAINE HYDROCHLORIDE 5 MG/ML
SOLUTION OPHTHALMIC AS NEEDED
Status: DISCONTINUED | OUTPATIENT
Start: 2018-02-02 | End: 2018-02-02 | Stop reason: HOSPADM

## 2018-02-02 RX ORDER — PHENYLEPHRINE HCL 2.5 %
1 DROPS OPHTHALMIC (EYE)
Status: COMPLETED | OUTPATIENT
Start: 2018-02-02 | End: 2018-02-02

## 2018-02-02 RX ORDER — PREDNISOLONE ACETATE 10 MG/ML
SUSPENSION/ DROPS OPHTHALMIC AS NEEDED
Status: DISCONTINUED | OUTPATIENT
Start: 2018-02-02 | End: 2018-02-02 | Stop reason: HOSPADM

## 2018-02-02 RX ORDER — TETRACAINE HYDROCHLORIDE 5 MG/ML
1 SOLUTION OPHTHALMIC AS NEEDED
Status: COMPLETED | OUTPATIENT
Start: 2018-02-02 | End: 2018-02-02

## 2018-02-02 RX ORDER — BRIMONIDINE TARTRATE 0.15 %
DROPS OPHTHALMIC (EYE) AS NEEDED
Status: DISCONTINUED | OUTPATIENT
Start: 2018-02-02 | End: 2018-02-02 | Stop reason: HOSPADM

## 2018-02-02 RX ORDER — ONDANSETRON 2 MG/ML
4 INJECTION INTRAMUSCULAR; INTRAVENOUS ONCE AS NEEDED
Status: CANCELLED | OUTPATIENT
Start: 2018-02-02

## 2018-02-02 RX ORDER — CYCLOPENTOLATE HYDROCHLORIDE 10 MG/ML
1 SOLUTION/ DROPS OPHTHALMIC
Status: COMPLETED | OUTPATIENT
Start: 2018-02-02 | End: 2018-02-02

## 2018-02-02 RX ORDER — PROMETHAZINE HYDROCHLORIDE 25 MG/1
25 SUPPOSITORY RECTAL ONCE AS NEEDED
Status: CANCELLED | OUTPATIENT
Start: 2018-02-02

## 2018-02-02 RX ORDER — FENTANYL CITRATE 50 UG/ML
INJECTION, SOLUTION INTRAMUSCULAR; INTRAVENOUS AS NEEDED
Status: DISCONTINUED | OUTPATIENT
Start: 2018-02-02 | End: 2018-02-02 | Stop reason: SURG

## 2018-02-02 RX ORDER — MOXIFLOXACIN 5 MG/ML
SOLUTION/ DROPS OPHTHALMIC AS NEEDED
Status: DISCONTINUED | OUTPATIENT
Start: 2018-02-02 | End: 2018-02-02 | Stop reason: HOSPADM

## 2018-02-02 RX ORDER — SODIUM CHLORIDE 0.9 % (FLUSH) 0.9 %
10 SYRINGE (ML) INJECTION ONCE
Status: COMPLETED | OUTPATIENT
Start: 2018-02-02 | End: 2018-02-02

## 2018-02-02 RX ORDER — PROMETHAZINE HYDROCHLORIDE 12.5 MG/1
25 TABLET ORAL ONCE AS NEEDED
Status: CANCELLED | OUTPATIENT
Start: 2018-02-02

## 2018-02-02 RX ADMIN — CYCLOPENTOLATE HYDROCHLORIDE 1 DROP: 10 SOLUTION/ DROPS OPHTHALMIC at 08:51

## 2018-02-02 RX ADMIN — CYCLOPENTOLATE HYDROCHLORIDE 1 DROP: 10 SOLUTION/ DROPS OPHTHALMIC at 09:11

## 2018-02-02 RX ADMIN — TETRACAINE HYDROCHLORIDE 1 DROP: 5 SOLUTION OPHTHALMIC at 08:51

## 2018-02-02 RX ADMIN — PHENYLEPHRINE HYDROCHLORIDE 1 DROP: 25 SOLUTION/ DROPS OPHTHALMIC at 08:51

## 2018-02-02 RX ADMIN — PHENYLEPHRINE HYDROCHLORIDE 1 DROP: 25 SOLUTION/ DROPS OPHTHALMIC at 09:11

## 2018-02-02 RX ADMIN — FENTANYL CITRATE 50 MCG: 50 INJECTION, SOLUTION INTRAMUSCULAR; INTRAVENOUS at 11:27

## 2018-02-02 RX ADMIN — PHENYLEPHRINE HYDROCHLORIDE 1 DROP: 25 SOLUTION/ DROPS OPHTHALMIC at 09:01

## 2018-02-02 RX ADMIN — TETRACAINE HYDROCHLORIDE 1 DROP: 5 SOLUTION OPHTHALMIC at 09:11

## 2018-02-02 RX ADMIN — SODIUM CHLORIDE, PRESERVATIVE FREE 10 ML: 5 INJECTION INTRAVENOUS at 08:58

## 2018-02-02 RX ADMIN — CYCLOPENTOLATE HYDROCHLORIDE 1 DROP: 10 SOLUTION/ DROPS OPHTHALMIC at 09:01

## 2018-02-02 NOTE — ANESTHESIA PREPROCEDURE EVALUATION
Anesthesia Evaluation     no history of anesthetic complications:  NPO Solid Status: > 8 hours  NPO Liquid Status: > 8 hours     Airway   Mallampati: II  TM distance: >3 FB  Neck ROM: full  no difficulty expected  Dental    (+) lower dentures and upper dentures    Pulmonary     breath sounds clear to auscultation  (+) a smoker (1 PPD) Current Smoked day of surgery, COPD, decreased breath sounds,     ROS comment: cough  Cardiovascular   Exercise tolerance: good (4-7 METS)    Rhythm: regular  Rate: normal    (+) hypertension, murmur, PVD, hyperlipidemia  (-) past MI, angina    ROS comment: EF 50-55%    Neuro/Psych- negative ROS  GI/Hepatic/Renal/Endo - negative ROS     Musculoskeletal     (+) back pain,   Abdominal  - normal exam   Substance History - negative use     OB/GYN          Other   (+) arthritis     (-) history of cancer                                          Anesthesia Plan    ASA 3     MAC     intravenous induction   Anesthetic plan and risks discussed with patient and spouse/significant other.

## 2018-02-02 NOTE — ANESTHESIA POSTPROCEDURE EVALUATION
Patient: Maciej Funk    Procedure Summary     Date Anesthesia Start Anesthesia Stop Room / Location    02/02/18 1121 1140  [facility] OR [facility] / [facility]       Procedure Diagnosis Surgeon Provider    CATARACT PHACO EXTRACTION WITH INTRAOCULAR LENS IMPLANT (Left Eye) Age-related nuclear cataract of left eye  (Age-related nuclear cataract of left eye [H25.12]) MD Sly Valera MD          Anesthesia Type: MAC  Last vitals  BP   173/89 (02/02/18 0850)   Temp   98 °F (36.7 °C) (02/02/18 0850)   Pulse   80 (02/02/18 0850)   Resp   20 (02/02/18 0850)     SpO2   99 % (02/02/18 0850)     Post Anesthesia Care and Evaluation    Patient location during evaluation: bedside  Patient participation: complete - patient cannot participate  Level of consciousness: awake  Pain score: 0  Pain management: adequate  Airway patency: patent  Anesthetic complications: No anesthetic complications  PONV Status: none  Cardiovascular status: acceptable  Respiratory status: acceptable  Hydration status: acceptable

## 2018-02-08 ENCOUNTER — ANESTHESIA EVENT (OUTPATIENT)
Dept: PERIOP | Facility: HOSPITAL | Age: 83
End: 2018-02-08

## 2018-02-09 ENCOUNTER — ANESTHESIA (OUTPATIENT)
Dept: PERIOP | Facility: HOSPITAL | Age: 83
End: 2018-02-09

## 2018-02-09 ENCOUNTER — HOSPITAL ENCOUNTER (OUTPATIENT)
Facility: HOSPITAL | Age: 83
Setting detail: HOSPITAL OUTPATIENT SURGERY
Discharge: HOME | End: 2018-02-09
Attending: OPHTHALMOLOGY | Admitting: OPHTHALMOLOGY

## 2018-02-09 VITALS
TEMPERATURE: 97.7 F | HEART RATE: 76 BPM | RESPIRATION RATE: 18 BRPM | WEIGHT: 155.2 LBS | OXYGEN SATURATION: 96 % | BODY MASS INDEX: 24.36 KG/M2 | HEIGHT: 67 IN | SYSTOLIC BLOOD PRESSURE: 130 MMHG | DIASTOLIC BLOOD PRESSURE: 65 MMHG

## 2018-02-09 PROCEDURE — V2632 POST CHMBR INTRAOCULAR LENS: HCPCS | Performed by: OPHTHALMOLOGY

## 2018-02-09 PROCEDURE — 25010000002 MIDAZOLAM PER 1 MG: Performed by: NURSE ANESTHETIST, CERTIFIED REGISTERED

## 2018-02-09 PROCEDURE — 25010000002 VANCOMYCIN PER 500 MG: Performed by: OPHTHALMOLOGY

## 2018-02-09 PROCEDURE — 25010000002 FENTANYL CITRATE (PF) 100 MCG/2ML SOLUTION: Performed by: NURSE ANESTHETIST, CERTIFIED REGISTERED

## 2018-02-09 PROCEDURE — 25010000002 EPINEPHRINE PER 0.1 MG: Performed by: OPHTHALMOLOGY

## 2018-02-09 DEVICE — LENS ACRYSOF IQ 6X13MM SN60WF 20.5: Type: IMPLANTABLE DEVICE | Status: FUNCTIONAL

## 2018-02-09 RX ORDER — PREDNISOLONE ACETATE 10 MG/ML
SUSPENSION/ DROPS OPHTHALMIC AS NEEDED
Status: DISCONTINUED | OUTPATIENT
Start: 2018-02-09 | End: 2018-02-09 | Stop reason: HOSPADM

## 2018-02-09 RX ORDER — TETRACAINE HYDROCHLORIDE 5 MG/ML
1 SOLUTION OPHTHALMIC AS NEEDED
Status: COMPLETED | OUTPATIENT
Start: 2018-02-09 | End: 2018-02-09

## 2018-02-09 RX ORDER — MOXIFLOXACIN 5 MG/ML
SOLUTION/ DROPS OPHTHALMIC AS NEEDED
Status: DISCONTINUED | OUTPATIENT
Start: 2018-02-09 | End: 2018-02-09 | Stop reason: HOSPADM

## 2018-02-09 RX ORDER — PROMETHAZINE HYDROCHLORIDE 25 MG/1
25 SUPPOSITORY RECTAL ONCE AS NEEDED
Status: DISCONTINUED | OUTPATIENT
Start: 2018-02-09 | End: 2018-02-09 | Stop reason: HOSPADM

## 2018-02-09 RX ORDER — CYCLOPENTOLATE HYDROCHLORIDE 10 MG/ML
1 SOLUTION/ DROPS OPHTHALMIC
Status: COMPLETED | OUTPATIENT
Start: 2018-02-09 | End: 2018-02-09

## 2018-02-09 RX ORDER — PROMETHAZINE HYDROCHLORIDE 25 MG/ML
12.5 INJECTION, SOLUTION INTRAMUSCULAR; INTRAVENOUS ONCE AS NEEDED
Status: DISCONTINUED | OUTPATIENT
Start: 2018-02-09 | End: 2018-02-09 | Stop reason: HOSPADM

## 2018-02-09 RX ORDER — FENTANYL CITRATE 50 UG/ML
INJECTION, SOLUTION INTRAMUSCULAR; INTRAVENOUS AS NEEDED
Status: DISCONTINUED | OUTPATIENT
Start: 2018-02-09 | End: 2018-02-09 | Stop reason: SURG

## 2018-02-09 RX ORDER — BRIMONIDINE TARTRATE 0.15 %
DROPS OPHTHALMIC (EYE) AS NEEDED
Status: DISCONTINUED | OUTPATIENT
Start: 2018-02-09 | End: 2018-02-09 | Stop reason: HOSPADM

## 2018-02-09 RX ORDER — ONDANSETRON 2 MG/ML
4 INJECTION INTRAMUSCULAR; INTRAVENOUS ONCE AS NEEDED
Status: CANCELLED | OUTPATIENT
Start: 2018-02-09

## 2018-02-09 RX ORDER — MIDAZOLAM HYDROCHLORIDE 1 MG/ML
INJECTION INTRAMUSCULAR; INTRAVENOUS AS NEEDED
Status: DISCONTINUED | OUTPATIENT
Start: 2018-02-09 | End: 2018-02-09 | Stop reason: SURG

## 2018-02-09 RX ORDER — PROMETHAZINE HYDROCHLORIDE 12.5 MG/1
25 TABLET ORAL ONCE AS NEEDED
Status: DISCONTINUED | OUTPATIENT
Start: 2018-02-09 | End: 2018-02-09 | Stop reason: HOSPADM

## 2018-02-09 RX ORDER — PHENYLEPHRINE HCL 2.5 %
1 DROPS OPHTHALMIC (EYE)
Status: COMPLETED | OUTPATIENT
Start: 2018-02-09 | End: 2018-02-09

## 2018-02-09 RX ORDER — MEPERIDINE HYDROCHLORIDE 50 MG/ML
12.5 INJECTION INTRAMUSCULAR; INTRAVENOUS; SUBCUTANEOUS
Status: DISCONTINUED | OUTPATIENT
Start: 2018-02-09 | End: 2018-02-09 | Stop reason: HOSPADM

## 2018-02-09 RX ORDER — SODIUM CHLORIDE 0.9 % (FLUSH) 0.9 %
10 SYRINGE (ML) INJECTION AS NEEDED
Status: DISCONTINUED | OUTPATIENT
Start: 2018-02-09 | End: 2018-02-09 | Stop reason: HOSPADM

## 2018-02-09 RX ORDER — ONDANSETRON 2 MG/ML
4 INJECTION INTRAMUSCULAR; INTRAVENOUS ONCE AS NEEDED
Status: DISCONTINUED | OUTPATIENT
Start: 2018-02-09 | End: 2018-02-09 | Stop reason: HOSPADM

## 2018-02-09 RX ORDER — TETRACAINE HYDROCHLORIDE 5 MG/ML
SOLUTION OPHTHALMIC AS NEEDED
Status: DISCONTINUED | OUTPATIENT
Start: 2018-02-09 | End: 2018-02-09 | Stop reason: HOSPADM

## 2018-02-09 RX ADMIN — MIDAZOLAM 1 MG: 1 INJECTION INTRAMUSCULAR; INTRAVENOUS at 11:32

## 2018-02-09 RX ADMIN — CYCLOPENTOLATE HYDROCHLORIDE 1 DROP: 10 SOLUTION/ DROPS OPHTHALMIC at 09:35

## 2018-02-09 RX ADMIN — PHENYLEPHRINE HYDROCHLORIDE 1 DROP: 25 SOLUTION/ DROPS OPHTHALMIC at 09:35

## 2018-02-09 RX ADMIN — PHENYLEPHRINE HYDROCHLORIDE 1 DROP: 25 SOLUTION/ DROPS OPHTHALMIC at 09:55

## 2018-02-09 RX ADMIN — CYCLOPENTOLATE HYDROCHLORIDE 1 DROP: 10 SOLUTION/ DROPS OPHTHALMIC at 09:55

## 2018-02-09 RX ADMIN — CYCLOPENTOLATE HYDROCHLORIDE 1 DROP: 10 SOLUTION/ DROPS OPHTHALMIC at 09:45

## 2018-02-09 RX ADMIN — TETRACAINE HYDROCHLORIDE 1 DROP: 5 SOLUTION OPHTHALMIC at 09:35

## 2018-02-09 RX ADMIN — FENTANYL CITRATE 25 MCG: 50 INJECTION, SOLUTION INTRAMUSCULAR; INTRAVENOUS at 11:32

## 2018-02-09 RX ADMIN — SODIUM CHLORIDE, PRESERVATIVE FREE 10 ML: 5 INJECTION INTRAVENOUS at 09:43

## 2018-02-09 RX ADMIN — TETRACAINE HYDROCHLORIDE 1 DROP: 5 SOLUTION OPHTHALMIC at 09:55

## 2018-02-09 RX ADMIN — PHENYLEPHRINE HYDROCHLORIDE 1 DROP: 25 SOLUTION/ DROPS OPHTHALMIC at 09:45

## 2018-02-09 NOTE — ANESTHESIA POSTPROCEDURE EVALUATION
Patient: Maciej Funk    Procedure Summary     Date Anesthesia Start Anesthesia Stop Room / Location    02/09/18 1131 1148  [facility] / [facility]       Procedure Diagnosis Surgeon Provider    CATARACT PHACO EXTRACTION WITH INTRAOCULAR LENS IMPLANT (Right Eye) Age-related nuclear cataract of right eye  (Age-related nuclear cataract of right eye [H25.11]) MD Tremayne Valera MD          Anesthesia Type: MAC  Last vitals  BP   138/65 (02/09/18 0939)   Temp   97.7 °F (36.5 °C) (02/09/18 0939)   Pulse   71 (02/09/18 0939)   Resp   18 (02/09/18 0939)     SpO2   97 % (02/09/18 0939)     Post Anesthesia Care and Evaluation    Patient location during evaluation: bedside  Patient participation: complete - patient participated  Level of consciousness: responsive to verbal stimuli and awake and alert  Pain management: adequate  Airway patency: patent  Anesthetic complications: No anesthetic complications    Cardiovascular status: acceptable  Respiratory status: acceptable  Hydration status: acceptable

## 2018-04-11 ENCOUNTER — OFFICE VISIT (OUTPATIENT)
Dept: FAMILY MEDICINE CLINIC | Facility: CLINIC | Age: 83
End: 2018-04-11

## 2018-04-11 VITALS
OXYGEN SATURATION: 98 % | HEART RATE: 82 BPM | WEIGHT: 162.9 LBS | HEIGHT: 67 IN | BODY MASS INDEX: 25.57 KG/M2 | SYSTOLIC BLOOD PRESSURE: 130 MMHG | DIASTOLIC BLOOD PRESSURE: 80 MMHG

## 2018-04-11 DIAGNOSIS — I10 ESSENTIAL HYPERTENSION: Primary | Chronic | ICD-10-CM

## 2018-04-11 DIAGNOSIS — E78.2 MIXED HYPERLIPIDEMIA: Chronic | ICD-10-CM

## 2018-04-11 DIAGNOSIS — J43.1 PANLOBULAR EMPHYSEMA (HCC): ICD-10-CM

## 2018-04-11 PROCEDURE — 99212 OFFICE O/P EST SF 10 MIN: CPT | Performed by: GENERAL PRACTICE

## 2018-04-11 RX ORDER — SIMVASTATIN 20 MG
20 TABLET ORAL NIGHTLY
Qty: 90 TABLET | Refills: 3 | Status: SHIPPED | OUTPATIENT
Start: 2018-04-11 | End: 2019-04-08 | Stop reason: SDUPTHER

## 2018-04-11 RX ORDER — LISINOPRIL 20 MG/1
20 TABLET ORAL DAILY
Qty: 90 TABLET | Refills: 3 | Status: SHIPPED | OUTPATIENT
Start: 2018-04-11 | End: 2019-04-08 | Stop reason: SDUPTHER

## 2018-04-11 NOTE — PROGRESS NOTES
Subjective   Maciej Funk is a 83 y.o. male.   Chief Complaint   Patient presents with   • Follow-up   • Hypertension     For review and evaluation of management of chronic medical problems.   Hypertension   This is a chronic problem. The current episode started more than 1 year ago. The problem is unchanged. The problem is controlled. Pertinent negatives include no headaches, neck pain or palpitations. There are no associated agents to hypertension. Risk factors for coronary artery disease include dyslipidemia and smoking/tobacco exposure. Current antihypertension treatment includes ACE inhibitors. The current treatment provides significant improvement. There are no compliance problems.       The following portions of the patient's history were reviewed and updated as appropriate: allergies, current medications, past social history and problem list.    Outpatient Medications Prior to Visit   Medication Sig Dispense Refill   • aspirin 81 MG chewable tablet Chew 81 mg Daily.     • latanoprost (XALATAN) 0.005 % ophthalmic solution Administer 1 drop into the left eye Every Night.     • Multiple Vitamins-Minerals (VISION-HECTOR PRESERVE PO) Take 1 capsule by mouth 2 (Two) Times a Day. PreserVision Lutein 226 mg-200 unit-5 mg-0.8 mg capsule      • lisinopril (PRINIVIL,ZESTRIL) 20 MG tablet Take 1 tablet by mouth Daily. 90 tablet 3   • simvastatin (ZOCOR) 20 MG tablet Take 1 tablet by mouth Every Night. Bedtime 90 tablet 3     No facility-administered medications prior to visit.        Review of Systems   Constitutional: Negative.  Negative for chills, fatigue, fever and unexpected weight change.   HENT: Negative.  Negative for congestion, ear pain, hearing loss, nosebleeds, rhinorrhea, sneezing, sore throat and tinnitus.    Eyes: Negative.  Negative for discharge.   Respiratory: Negative.  Negative for cough and wheezing.    Cardiovascular: Negative.  Negative for palpitations.   Gastrointestinal: Negative.   "Negative for abdominal pain, constipation, diarrhea, nausea and vomiting.   Endocrine: Negative.    Genitourinary: Negative.  Negative for dysuria, frequency and urgency.   Musculoskeletal: Negative.  Negative for arthralgias, back pain, joint swelling and neck pain.   Skin: Negative.  Negative for rash.   Allergic/Immunologic: Negative.    Neurological: Negative.  Negative for dizziness, weakness, numbness and headaches.   Hematological: Negative.  Negative for adenopathy.   Psychiatric/Behavioral: Negative.  Negative for dysphoric mood and sleep disturbance. The patient is not nervous/anxious.        Objective   Visit Vitals  /80 (BP Location: Left arm, Patient Position: Sitting, Cuff Size: Adult)   Pulse 82   Ht 170.2 cm (67\")   Wt 73.9 kg (162 lb 14.4 oz)   SpO2 98%   BMI 25.51 kg/m²     Physical Exam   Constitutional: He is oriented to person, place, and time. He appears well-developed and well-nourished. No distress.   HENT:   Head: Normocephalic.   Nose: Nose normal.   Mouth/Throat: Oropharynx is clear and moist.   Eyes: Conjunctivae and EOM are normal. Pupils are equal, round, and reactive to light. Right eye exhibits no discharge. Left eye exhibits no discharge.   Neck: No thyromegaly present.   Cardiovascular: Normal rate, regular rhythm, normal heart sounds and intact distal pulses.    No murmur heard.  Pulmonary/Chest: Effort normal and breath sounds normal.   Musculoskeletal: He exhibits no edema.   Lymphadenopathy:     He has no cervical adenopathy.   Neurological: He is alert and oriented to person, place, and time.   Skin: Skin is warm and dry.   Psychiatric: He has a normal mood and affect.   Nursing note and vitals reviewed.    Assessment/Plan   Problem List Items Addressed This Visit        Cardiovascular and Mediastinum    Essential hypertension - Primary (Chronic)    Relevant Medications    lisinopril (PRINIVIL,ZESTRIL) 20 MG tablet    Other Relevant Orders    CBC & Differential    " Comprehensive Metabolic Panel    Lipid Panel    Urinalysis With / Culture If Indicated - Urine, Clean Catch    Hyperlipidemia (Chronic)    Relevant Medications    simvastatin (ZOCOR) 20 MG tablet    Other Relevant Orders    CBC & Differential    Comprehensive Metabolic Panel    Lipid Panel    Urinalysis With / Culture If Indicated - Urine, Clean Catch       Respiratory    Panlobular emphysema    Relevant Orders    CBC & Differential    Comprehensive Metabolic Panel    Lipid Panel    Urinalysis With / Culture If Indicated - Urine, Clean Catch      Other Visit Diagnoses    None.         Continue current treatment. I advised the patient of the risks in continuing to use tobacco, and I provided this patient with smoking cessation educational materials.    During this visit, I spent < 3 minutes counseling the patient regarding smoking cessation.    New Medications Ordered This Visit   Medications   • lisinopril (PRINIVIL,ZESTRIL) 20 MG tablet     Sig: Take 1 tablet by mouth Daily.     Dispense:  90 tablet     Refill:  3   • simvastatin (ZOCOR) 20 MG tablet     Sig: Take 1 tablet by mouth Every Night. Bedtime     Dispense:  90 tablet     Refill:  3     Return in about 6 months (around 10/11/2018) for Annual physical.

## 2018-10-04 ENCOUNTER — LAB (OUTPATIENT)
Dept: LAB | Facility: HOSPITAL | Age: 83
End: 2018-10-04

## 2018-10-04 DIAGNOSIS — J43.1 PANLOBULAR EMPHYSEMA (HCC): ICD-10-CM

## 2018-10-04 DIAGNOSIS — I10 ESSENTIAL HYPERTENSION: ICD-10-CM

## 2018-10-04 DIAGNOSIS — E78.2 MIXED HYPERLIPIDEMIA: ICD-10-CM

## 2018-10-04 LAB
BACTERIA UR QL AUTO: ABNORMAL /HPF
BASOPHILS # BLD AUTO: 0.05 10*3/MM3 (ref 0–0.2)
BASOPHILS NFR BLD AUTO: 0.5 % (ref 0–2)
BILIRUB UR QL STRIP: NEGATIVE
CLARITY UR: CLEAR
COLOR UR: YELLOW
DEPRECATED RDW RBC AUTO: 41.5 FL (ref 35.1–43.9)
EOSINOPHIL # BLD AUTO: 0.1 10*3/MM3 (ref 0–0.7)
EOSINOPHIL NFR BLD AUTO: 1.1 % (ref 0–7)
ERYTHROCYTE [DISTWIDTH] IN BLOOD BY AUTOMATED COUNT: 12.8 % (ref 11.5–14.5)
GLUCOSE UR STRIP-MCNC: NEGATIVE MG/DL
HCT VFR BLD AUTO: 40 % (ref 39–49)
HGB BLD-MCNC: 13.7 G/DL (ref 13.7–17.3)
HGB UR QL STRIP.AUTO: NEGATIVE
HYALINE CASTS UR QL AUTO: ABNORMAL /LPF
IMM GRANULOCYTES # BLD: 0.03 10*3/MM3 (ref 0–0.02)
IMM GRANULOCYTES NFR BLD: 0.3 % (ref 0–0.5)
KETONES UR QL STRIP: NEGATIVE
LEUKOCYTE ESTERASE UR QL STRIP.AUTO: ABNORMAL
LYMPHOCYTES # BLD AUTO: 3.33 10*3/MM3 (ref 0.6–4.2)
LYMPHOCYTES NFR BLD AUTO: 36.3 % (ref 10–50)
MCH RBC QN AUTO: 30.5 PG (ref 26.5–34)
MCHC RBC AUTO-ENTMCNC: 34.3 G/DL (ref 31.5–36.3)
MCV RBC AUTO: 89.1 FL (ref 80–98)
MONOCYTES # BLD AUTO: 0.65 10*3/MM3 (ref 0–0.9)
MONOCYTES NFR BLD AUTO: 7.1 % (ref 0–12)
NEUTROPHILS # BLD AUTO: 5.02 10*3/MM3 (ref 2–8.6)
NEUTROPHILS NFR BLD AUTO: 54.7 % (ref 37–80)
NITRITE UR QL STRIP: NEGATIVE
PH UR STRIP.AUTO: 5.5 [PH] (ref 5–9)
PLATELET # BLD AUTO: 278 10*3/MM3 (ref 150–450)
PMV BLD AUTO: 9.6 FL (ref 8–12)
PROT UR QL STRIP: NEGATIVE
RBC # BLD AUTO: 4.49 10*6/MM3 (ref 4.37–5.74)
RBC # UR: ABNORMAL /HPF
REF LAB TEST METHOD: ABNORMAL
SP GR UR STRIP: 1.02 (ref 1–1.03)
SQUAMOUS #/AREA URNS HPF: ABNORMAL /HPF
UROBILINOGEN UR QL STRIP: ABNORMAL
WBC NRBC COR # BLD: 9.18 10*3/MM3 (ref 3.2–9.8)
WBC UR QL AUTO: ABNORMAL /HPF

## 2018-10-04 PROCEDURE — 85025 COMPLETE CBC W/AUTO DIFF WBC: CPT

## 2018-10-04 PROCEDURE — 81001 URINALYSIS AUTO W/SCOPE: CPT

## 2018-10-04 PROCEDURE — 36415 COLL VENOUS BLD VENIPUNCTURE: CPT

## 2018-10-04 PROCEDURE — 87086 URINE CULTURE/COLONY COUNT: CPT

## 2018-10-05 LAB — BACTERIA SPEC AEROBE CULT: NORMAL

## 2018-10-11 ENCOUNTER — OFFICE VISIT (OUTPATIENT)
Dept: FAMILY MEDICINE CLINIC | Facility: CLINIC | Age: 83
End: 2018-10-11

## 2018-10-11 VITALS
DIASTOLIC BLOOD PRESSURE: 70 MMHG | HEART RATE: 74 BPM | BODY MASS INDEX: 25.11 KG/M2 | WEIGHT: 160 LBS | HEIGHT: 67 IN | SYSTOLIC BLOOD PRESSURE: 154 MMHG | OXYGEN SATURATION: 95 %

## 2018-10-11 DIAGNOSIS — I10 ESSENTIAL HYPERTENSION: Primary | Chronic | ICD-10-CM

## 2018-10-11 DIAGNOSIS — J43.1 PANLOBULAR EMPHYSEMA (HCC): Chronic | ICD-10-CM

## 2018-10-11 DIAGNOSIS — E78.2 MIXED HYPERLIPIDEMIA: Chronic | ICD-10-CM

## 2018-10-11 DIAGNOSIS — F17.218 NICOTINE DEPENDENCE, CIGARETTES, WITH OTHER NICOTINE-INDUCED DISORDERS: Chronic | ICD-10-CM

## 2018-10-11 DIAGNOSIS — I65.21 CAROTID STENOSIS, RIGHT: ICD-10-CM

## 2018-10-11 PROCEDURE — 99214 OFFICE O/P EST MOD 30 MIN: CPT | Performed by: GENERAL PRACTICE

## 2018-10-11 RX ORDER — VARENICLINE TARTRATE 1 MG/1
1 TABLET, FILM COATED ORAL 2 TIMES DAILY
Qty: 60 TABLET | Refills: 3 | Status: SHIPPED | OUTPATIENT
Start: 2018-10-11 | End: 2018-12-03

## 2018-10-11 NOTE — PROGRESS NOTES
Subjective   Maciej Funk is a 83 y.o. male.     Chief Complaint   Patient presents with   • Annual Exam     labs   • Hypertension     For review and evaluation of management of chronic medical problems. Labs reviewed.   Hypertension   This is a chronic problem. The current episode started more than 1 year ago. The problem is unchanged. The problem is controlled. Pertinent negatives include no chest pain, headaches, neck pain, palpitations or shortness of breath. There are no associated agents to hypertension. Risk factors for coronary artery disease include dyslipidemia and smoking/tobacco exposure. Current antihypertension treatment includes ACE inhibitors. The current treatment provides significant improvement. There are no compliance problems.    Hyperlipidemia   This is a chronic problem. The current episode started more than 1 year ago. The problem is controlled. Recent lipid tests were reviewed and are normal. There are no known factors aggravating his hyperlipidemia. Pertinent negatives include no chest pain, myalgias or shortness of breath. Current antihyperlipidemic treatment includes statins. The current treatment provides significant improvement of lipids. There are no compliance problems.    COPD   This is a chronic problem. The current episode started more than 1 year ago. The problem occurs constantly. The problem has been unchanged. Pertinent negatives include no abdominal pain, arthralgias, chest pain, chills, congestion, coughing, fatigue, fever, headaches, joint swelling, myalgias, nausea, neck pain, numbness, rash, sore throat, vomiting or weakness. The symptoms are aggravated by smoking. He has tried nothing for the symptoms.        The following portions of the patient's history were reviewed and updated as appropriate: allergies, current medications, past family and social history and problem list.    Outpatient Medications Prior to Visit   Medication Sig Dispense Refill   • aspirin 81  "MG chewable tablet Chew 81 mg Daily.     • latanoprost (XALATAN) 0.005 % ophthalmic solution Administer 1 drop into the left eye Every Night.     • lisinopril (PRINIVIL,ZESTRIL) 20 MG tablet Take 1 tablet by mouth Daily. 90 tablet 3   • Multiple Vitamins-Minerals (VISION-HECTOR PRESERVE PO) Take 1 capsule by mouth 2 (Two) Times a Day. PreserVision Lutein 226 mg-200 unit-5 mg-0.8 mg capsule      • simvastatin (ZOCOR) 20 MG tablet Take 1 tablet by mouth Every Night. Bedtime 90 tablet 3     No facility-administered medications prior to visit.        Review of Systems   Constitutional: Negative.  Negative for chills, fatigue, fever and unexpected weight change.   HENT: Negative.  Negative for congestion, ear pain, hearing loss, nosebleeds, rhinorrhea, sneezing, sore throat and tinnitus.    Eyes: Negative.  Negative for discharge.   Respiratory: Negative.  Negative for cough, shortness of breath and wheezing.    Cardiovascular: Negative.  Negative for chest pain and palpitations.   Gastrointestinal: Negative.  Negative for abdominal pain, constipation, diarrhea, nausea and vomiting.   Endocrine: Negative.    Genitourinary: Negative.  Negative for dysuria, frequency and urgency.   Musculoskeletal: Negative.  Negative for arthralgias, back pain, joint swelling, myalgias and neck pain.   Skin: Negative.  Negative for rash.   Allergic/Immunologic: Negative.    Neurological: Negative.  Negative for dizziness, weakness, numbness and headaches.   Hematological: Negative.  Negative for adenopathy.   Psychiatric/Behavioral: Negative.  Negative for dysphoric mood and sleep disturbance. The patient is not nervous/anxious.        Objective     Visit Vitals  /70   Pulse 74   Ht 170.2 cm (67\")   Wt 72.6 kg (160 lb)   SpO2 95%   BMI 25.06 kg/m²     Physical Exam   Constitutional: He is oriented to person, place, and time. He appears well-developed and well-nourished. No distress.   HENT:   Head: Normocephalic and atraumatic. "   Nose: Nose normal.   Mouth/Throat: Oropharynx is clear and moist.   Eyes: Pupils are equal, round, and reactive to light. Conjunctivae and EOM are normal. Right eye exhibits no discharge. Left eye exhibits no discharge.   Neck: Normal range of motion. No thyromegaly present.   Cardiovascular: Normal rate, regular rhythm, normal heart sounds and intact distal pulses.    No murmur heard.  Pulmonary/Chest: Effort normal and breath sounds normal. No respiratory distress. He has no wheezes. He has no rales. He exhibits no tenderness.   Abdominal: Soft. Bowel sounds are normal. He exhibits no distension and no mass. There is no tenderness. No hernia.   Musculoskeletal: Normal range of motion. He exhibits no deformity.   Lymphadenopathy:     He has no cervical adenopathy.   Neurological: He is alert and oriented to person, place, and time. He has normal reflexes.   Skin: Skin is warm and dry. No rash noted. No pallor.   Psychiatric: He has a normal mood and affect. His behavior is normal. Judgment and thought content normal.     Results for orders placed or performed in visit on 10/04/18   Urine Culture - Urine,   Result Value Ref Range    Urine Culture Mixed Adrianna Isolated    Urinalysis With / Culture If Indicated - Urine, Clean Catch   Result Value Ref Range    Color, UA Yellow Yellow, Straw, Dark Yellow, Cecy    Appearance, UA Clear Clear    pH, UA 5.5 5.0 - 9.0    Specific Gravity, UA 1.016 1.003 - 1.030    Glucose, UA Negative Negative    Ketones, UA Negative Negative    Bilirubin, UA Negative Negative    Blood, UA Negative Negative    Protein, UA Negative Negative    Leuk Esterase, UA Small (1+) (A) Negative    Nitrite, UA Negative Negative    Urobilinogen, UA 1.0 E.U./dL 0.2 - 1.0 E.U./dL   CBC Auto Differential   Result Value Ref Range    WBC 9.18 3.20 - 9.80 10*3/mm3    RBC 4.49 4.37 - 5.74 10*6/mm3    Hemoglobin 13.7 13.7 - 17.3 g/dL    Hematocrit 40.0 39.0 - 49.0 %    MCV 89.1 80.0 - 98.0 fL    MCH 30.5 26.5  - 34.0 pg    MCHC 34.3 31.5 - 36.3 g/dL    RDW 12.8 11.5 - 14.5 %    RDW-SD 41.5 35.1 - 43.9 fl    MPV 9.6 8.0 - 12.0 fL    Platelets 278 150 - 450 10*3/mm3    Neutrophil % 54.7 37.0 - 80.0 %    Lymphocyte % 36.3 10.0 - 50.0 %    Monocyte % 7.1 0.0 - 12.0 %    Eosinophil % 1.1 0.0 - 7.0 %    Basophil % 0.5 0.0 - 2.0 %    Immature Grans % 0.3 0.0 - 0.5 %    Neutrophils, Absolute 5.02 2.00 - 8.60 10*3/mm3    Lymphocytes, Absolute 3.33 0.60 - 4.20 10*3/mm3    Monocytes, Absolute 0.65 0.00 - 0.90 10*3/mm3    Eosinophils, Absolute 0.10 0.00 - 0.70 10*3/mm3    Basophils, Absolute 0.05 0.00 - 0.20 10*3/mm3    Immature Grans, Absolute 0.03 (H) 0.00 - 0.02 10*3/mm3   Urinalysis, Microscopic Only - Urine, Clean Catch   Result Value Ref Range    RBC, UA None Seen None Seen /HPF    WBC, UA 13-20 (A) None Seen, 0-2, 3-5 /HPF    Bacteria, UA None Seen None Seen /HPF    Squamous Epithelial Cells, UA None Seen None Seen, 0-2 /HPF    Hyaline Casts, UA None Seen None Seen /LPF    Methodology Automated Microscopy       Assessment/Plan   Problem List Items Addressed This Visit        Cardiovascular and Mediastinum    Essential hypertension - Primary (Chronic)    Hyperlipidemia (Chronic)       Respiratory    Panlobular emphysema (CMS/HCC) (Chronic)       Nervous and Auditory    Nicotine dependence, cigarettes, with other nicotine-induced disorders    Relevant Medications    varenicline (CHANTIX STARTING MONTH PAK) 0.5 MG X 11 & 1 MG X 42 tablet    varenicline (CHANTIX CONTINUING MONTH PAK) 1 MG tablet      Other Visit Diagnoses     Carotid stenosis, right              Continue current treatment. I advised the patient of the risks in continuing to use tobacco.  I also discussed how to quit smoking and the patient has expressed the willingness to quit.  Would like to try Chantix.     New Medications Ordered This Visit   Medications   • varenicline (CHANTIX STARTING MONTH PAK) 0.5 MG X 11 & 1 MG X 42 tablet     Sig: Take 0.5 mg one daily  on days 1-3 and and 0.5 mg twice daily on days 4-7.Then 1 mg twice daily for a total of 12 weeks.     Dispense:  53 tablet     Refill:  0   • varenicline (CHANTIX CONTINUING MONTH HELIO) 1 MG tablet     Sig: Take 1 tablet by mouth 2 (Two) Times a Day.     Dispense:  60 tablet     Refill:  3     Fill after the starter pack     Return in about 4 months (around 2/11/2019) for Recheck, medicare wellness visit.

## 2018-11-20 ENCOUNTER — APPOINTMENT (OUTPATIENT)
Dept: CT IMAGING | Facility: HOSPITAL | Age: 83
End: 2018-11-20

## 2018-11-20 ENCOUNTER — APPOINTMENT (OUTPATIENT)
Dept: ULTRASOUND IMAGING | Facility: HOSPITAL | Age: 83
End: 2018-11-20

## 2018-11-20 ENCOUNTER — ANESTHESIA EVENT (OUTPATIENT)
Dept: PERIOP | Facility: HOSPITAL | Age: 83
End: 2018-11-20

## 2018-11-20 ENCOUNTER — APPOINTMENT (OUTPATIENT)
Dept: GENERAL RADIOLOGY | Facility: HOSPITAL | Age: 83
End: 2018-11-20

## 2018-11-20 ENCOUNTER — HOSPITAL ENCOUNTER (INPATIENT)
Facility: HOSPITAL | Age: 83
LOS: 4 days | Discharge: HOME HEALTH | End: 2018-11-24
Attending: FAMILY MEDICINE | Admitting: ORTHOPAEDIC SURGERY

## 2018-11-20 ENCOUNTER — ANESTHESIA (OUTPATIENT)
Dept: PERIOP | Facility: HOSPITAL | Age: 83
End: 2018-11-20

## 2018-11-20 ENCOUNTER — HOSPITAL ENCOUNTER (EMERGENCY)
Facility: HOSPITAL | Age: 83
End: 2018-11-20

## 2018-11-20 DIAGNOSIS — S72.142A CLOSED DISPLACED INTERTROCHANTERIC FRACTURE OF LEFT FEMUR, INITIAL ENCOUNTER (HCC): Primary | ICD-10-CM

## 2018-11-20 DIAGNOSIS — I27.20 PULMONARY HYPERTENSION (HCC): Chronic | ICD-10-CM

## 2018-11-20 DIAGNOSIS — F17.218 NICOTINE DEPENDENCE, CIGARETTES, WITH OTHER NICOTINE-INDUCED DISORDERS: ICD-10-CM

## 2018-11-20 DIAGNOSIS — Z74.09 IMPAIRED FUNCTIONAL MOBILITY AND ACTIVITY TOLERANCE: ICD-10-CM

## 2018-11-20 DIAGNOSIS — Z74.09 IMPAIRED MOBILITY AND ACTIVITIES OF DAILY LIVING: ICD-10-CM

## 2018-11-20 DIAGNOSIS — H47.20 OPTIC ATROPHY: ICD-10-CM

## 2018-11-20 DIAGNOSIS — I65.23 CAROTID STENOSIS, BILATERAL: ICD-10-CM

## 2018-11-20 DIAGNOSIS — Z86.010 HISTORY OF ADENOMATOUS POLYP OF COLON: ICD-10-CM

## 2018-11-20 DIAGNOSIS — S72.22XA CLOSED DISPLACED SUBTROCHANTERIC FRACTURE OF LEFT FEMUR, INITIAL ENCOUNTER (HCC): ICD-10-CM

## 2018-11-20 DIAGNOSIS — E78.2 MIXED HYPERLIPIDEMIA: Chronic | ICD-10-CM

## 2018-11-20 DIAGNOSIS — R55 SYNCOPE, UNSPECIFIED SYNCOPE TYPE: ICD-10-CM

## 2018-11-20 DIAGNOSIS — S72.142D CLOSED DISPLACED INTERTROCHANTERIC FRACTURE OF LEFT FEMUR WITH ROUTINE HEALING, SUBSEQUENT ENCOUNTER: ICD-10-CM

## 2018-11-20 DIAGNOSIS — Z78.9 IMPAIRED MOBILITY AND ACTIVITIES OF DAILY LIVING: ICD-10-CM

## 2018-11-20 DIAGNOSIS — Z09 FOLLOW-UP SURGERY CARE: ICD-10-CM

## 2018-11-20 DIAGNOSIS — F17.200 TOBACCO DEPENDENCE SYNDROME: ICD-10-CM

## 2018-11-20 DIAGNOSIS — I70.90 ATHEROSCLEROSIS: Chronic | ICD-10-CM

## 2018-11-20 DIAGNOSIS — R91.8 MASS OF RIGHT LUNG: ICD-10-CM

## 2018-11-20 LAB
ABO GROUP BLD: NORMAL
ALBUMIN SERPL-MCNC: 3.7 G/DL (ref 3.4–4.8)
ALBUMIN/GLOB SERPL: 1.3 G/DL (ref 1.1–1.8)
ALP SERPL-CCNC: 68 U/L (ref 38–126)
ALT SERPL W P-5'-P-CCNC: 18 U/L (ref 21–72)
ANION GAP SERPL CALCULATED.3IONS-SCNC: 8 MMOL/L (ref 5–15)
APTT PPP: 33.3 SECONDS (ref 20–40.3)
AST SERPL-CCNC: 19 U/L (ref 17–59)
BACTERIA UR QL AUTO: ABNORMAL /HPF
BASOPHILS # BLD AUTO: 0.03 10*3/MM3 (ref 0–0.2)
BASOPHILS NFR BLD AUTO: 0.2 % (ref 0–2)
BILIRUB SERPL-MCNC: 1.1 MG/DL (ref 0.2–1.3)
BILIRUB UR QL STRIP: NEGATIVE
BLD GP AB SCN SERPL QL: NEGATIVE
BUN BLD-MCNC: 20 MG/DL (ref 7–21)
BUN/CREAT SERPL: 16.7 (ref 7–25)
CALCIUM SPEC-SCNC: 8.7 MG/DL (ref 8.4–10.2)
CHLORIDE SERPL-SCNC: 102 MMOL/L (ref 95–110)
CLARITY UR: ABNORMAL
CO2 SERPL-SCNC: 26 MMOL/L (ref 22–31)
COLOR UR: YELLOW
CREAT BLD-MCNC: 1.2 MG/DL (ref 0.7–1.3)
DEPRECATED RDW RBC AUTO: 41.5 FL (ref 35.1–43.9)
EOSINOPHIL # BLD AUTO: 0.08 10*3/MM3 (ref 0–0.7)
EOSINOPHIL NFR BLD AUTO: 0.5 % (ref 0–7)
ERYTHROCYTE [DISTWIDTH] IN BLOOD BY AUTOMATED COUNT: 12.5 % (ref 11.5–14.5)
GFR SERPL CREATININE-BSD FRML MDRD: 58 ML/MIN/1.73 (ref 42–98)
GLOBULIN UR ELPH-MCNC: 2.8 GM/DL (ref 2.3–3.5)
GLUCOSE BLD-MCNC: 132 MG/DL (ref 60–100)
GLUCOSE UR STRIP-MCNC: NEGATIVE MG/DL
HCT VFR BLD AUTO: 36.6 % (ref 39–49)
HGB BLD-MCNC: 12.7 G/DL (ref 13.7–17.3)
HGB UR QL STRIP.AUTO: ABNORMAL
HOLD SPECIMEN: NORMAL
HOLD SPECIMEN: NORMAL
HYALINE CASTS UR QL AUTO: ABNORMAL /LPF
IMM GRANULOCYTES # BLD: 0.05 10*3/MM3 (ref 0–0.02)
IMM GRANULOCYTES NFR BLD: 0.3 % (ref 0–0.5)
INR PPP: 1.1 (ref 0.8–1.2)
KETONES UR QL STRIP: NEGATIVE
LEUKOCYTE ESTERASE UR QL STRIP.AUTO: NEGATIVE
LYMPHOCYTES # BLD AUTO: 2.06 10*3/MM3 (ref 0.6–4.2)
LYMPHOCYTES NFR BLD AUTO: 12 % (ref 10–50)
Lab: NORMAL
MAGNESIUM SERPL-MCNC: 2 MG/DL (ref 1.6–2.3)
MCH RBC QN AUTO: 31.3 PG (ref 26.5–34)
MCHC RBC AUTO-ENTMCNC: 34.7 G/DL (ref 31.5–36.3)
MCV RBC AUTO: 90.1 FL (ref 80–98)
MONOCYTES # BLD AUTO: 0.65 10*3/MM3 (ref 0–0.9)
MONOCYTES NFR BLD AUTO: 3.8 % (ref 0–12)
NEUTROPHILS # BLD AUTO: 14.29 10*3/MM3 (ref 2–8.6)
NEUTROPHILS NFR BLD AUTO: 83.2 % (ref 37–80)
NITRITE UR QL STRIP: NEGATIVE
PH UR STRIP.AUTO: 5.5 [PH] (ref 5–9)
PLATELET # BLD AUTO: 227 10*3/MM3 (ref 150–450)
PMV BLD AUTO: 8.6 FL (ref 8–12)
POTASSIUM BLD-SCNC: 3.8 MMOL/L (ref 3.5–5.1)
PROT SERPL-MCNC: 6.5 G/DL (ref 6.3–8.6)
PROT UR QL STRIP: ABNORMAL
PROTHROMBIN TIME: 14 SECONDS (ref 11.1–15.3)
RBC # BLD AUTO: 4.06 10*6/MM3 (ref 4.37–5.74)
RBC # UR: ABNORMAL /HPF
REF LAB TEST METHOD: ABNORMAL
RH BLD: POSITIVE
SODIUM BLD-SCNC: 136 MMOL/L (ref 137–145)
SP GR UR STRIP: 1.02 (ref 1–1.03)
SQUAMOUS #/AREA URNS HPF: ABNORMAL /HPF
T&S EXPIRATION DATE: NORMAL
TROPONIN I SERPL-MCNC: <0.012 NG/ML
UROBILINOGEN UR QL STRIP: ABNORMAL
WBC NRBC COR # BLD: 17.16 10*3/MM3 (ref 3.2–9.8)
WBC UR QL AUTO: ABNORMAL /HPF
WHOLE BLOOD HOLD SPECIMEN: NORMAL
WHOLE BLOOD HOLD SPECIMEN: NORMAL

## 2018-11-20 PROCEDURE — 70450 CT HEAD/BRAIN W/O DYE: CPT

## 2018-11-20 PROCEDURE — 83735 ASSAY OF MAGNESIUM: CPT | Performed by: FAMILY MEDICINE

## 2018-11-20 PROCEDURE — 93880 EXTRACRANIAL BILAT STUDY: CPT

## 2018-11-20 PROCEDURE — 85025 COMPLETE CBC W/AUTO DIFF WBC: CPT | Performed by: FAMILY MEDICINE

## 2018-11-20 PROCEDURE — 99285 EMERGENCY DEPT VISIT HI MDM: CPT

## 2018-11-20 PROCEDURE — 87086 URINE CULTURE/COLONY COUNT: CPT | Performed by: PHYSICIAN ASSISTANT

## 2018-11-20 PROCEDURE — 73502 X-RAY EXAM HIP UNI 2-3 VIEWS: CPT

## 2018-11-20 PROCEDURE — 0QS706Z REPOSITION LEFT UPPER FEMUR WITH INTRAMEDULLARY INTERNAL FIXATION DEVICE, OPEN APPROACH: ICD-10-PCS | Performed by: ORTHOPAEDIC SURGERY

## 2018-11-20 PROCEDURE — 85610 PROTHROMBIN TIME: CPT | Performed by: ORTHOPAEDIC SURGERY

## 2018-11-20 PROCEDURE — 71045 X-RAY EXAM CHEST 1 VIEW: CPT

## 2018-11-20 PROCEDURE — 86901 BLOOD TYPING SEROLOGIC RH(D): CPT | Performed by: PHYSICIAN ASSISTANT

## 2018-11-20 PROCEDURE — 25010000002 MORPHINE PER 10 MG: Performed by: FAMILY MEDICINE

## 2018-11-20 PROCEDURE — 74175 CTA ABDOMEN W/CONTRAST: CPT

## 2018-11-20 PROCEDURE — 86900 BLOOD TYPING SEROLOGIC ABO: CPT | Performed by: PHYSICIAN ASSISTANT

## 2018-11-20 PROCEDURE — 85730 THROMBOPLASTIN TIME PARTIAL: CPT | Performed by: ORTHOPAEDIC SURGERY

## 2018-11-20 PROCEDURE — 0 IOPAMIDOL PER 1 ML: Performed by: FAMILY MEDICINE

## 2018-11-20 PROCEDURE — 80053 COMPREHEN METABOLIC PANEL: CPT | Performed by: FAMILY MEDICINE

## 2018-11-20 PROCEDURE — 86850 RBC ANTIBODY SCREEN: CPT | Performed by: PHYSICIAN ASSISTANT

## 2018-11-20 PROCEDURE — 36415 COLL VENOUS BLD VENIPUNCTURE: CPT | Performed by: PHYSICIAN ASSISTANT

## 2018-11-20 PROCEDURE — 93005 ELECTROCARDIOGRAM TRACING: CPT | Performed by: FAMILY MEDICINE

## 2018-11-20 PROCEDURE — 99222 1ST HOSP IP/OBS MODERATE 55: CPT | Performed by: ORTHOPAEDIC SURGERY

## 2018-11-20 PROCEDURE — 84484 ASSAY OF TROPONIN QUANT: CPT | Performed by: FAMILY MEDICINE

## 2018-11-20 PROCEDURE — 73552 X-RAY EXAM OF FEMUR 2/>: CPT

## 2018-11-20 PROCEDURE — 93010 ELECTROCARDIOGRAM REPORT: CPT | Performed by: INTERNAL MEDICINE

## 2018-11-20 PROCEDURE — 81001 URINALYSIS AUTO W/SCOPE: CPT | Performed by: PHYSICIAN ASSISTANT

## 2018-11-20 RX ORDER — LATANOPROST 50 UG/ML
1 SOLUTION/ DROPS OPHTHALMIC NIGHTLY
Status: DISCONTINUED | OUTPATIENT
Start: 2018-11-20 | End: 2018-11-24 | Stop reason: HOSPADM

## 2018-11-20 RX ORDER — SODIUM CHLORIDE 0.9 % (FLUSH) 0.9 %
10 SYRINGE (ML) INJECTION AS NEEDED
Status: DISCONTINUED | OUTPATIENT
Start: 2018-11-20 | End: 2018-11-24 | Stop reason: HOSPADM

## 2018-11-20 RX ORDER — SODIUM CHLORIDE 9 MG/ML
50 INJECTION, SOLUTION INTRAVENOUS CONTINUOUS
Status: DISCONTINUED | OUTPATIENT
Start: 2018-11-20 | End: 2018-11-24 | Stop reason: HOSPADM

## 2018-11-20 RX ORDER — SODIUM CHLORIDE 0.9 % (FLUSH) 0.9 %
3 SYRINGE (ML) INJECTION EVERY 12 HOURS SCHEDULED
Status: DISCONTINUED | OUTPATIENT
Start: 2018-11-20 | End: 2018-11-24 | Stop reason: HOSPADM

## 2018-11-20 RX ORDER — BUPIVACAINE HCL/0.9 % NACL/PF 0.1 %
2 PLASTIC BAG, INJECTION (ML) EPIDURAL ONCE
Status: DISCONTINUED | OUTPATIENT
Start: 2018-11-21 | End: 2018-11-24 | Stop reason: HOSPADM

## 2018-11-20 RX ORDER — LISINOPRIL 20 MG/1
20 TABLET ORAL DAILY
Status: DISCONTINUED | OUTPATIENT
Start: 2018-11-20 | End: 2018-11-24 | Stop reason: HOSPADM

## 2018-11-20 RX ORDER — SODIUM CHLORIDE AND POTASSIUM CHLORIDE 150; 900 MG/100ML; MG/100ML
100 INJECTION, SOLUTION INTRAVENOUS CONTINUOUS
Status: DISCONTINUED | OUTPATIENT
Start: 2018-11-21 | End: 2018-11-24 | Stop reason: HOSPADM

## 2018-11-20 RX ORDER — BUPIVACAINE HCL/0.9 % NACL/PF 0.1 %
2 PLASTIC BAG, INJECTION (ML) EPIDURAL ONCE
Status: DISCONTINUED | OUTPATIENT
Start: 2018-11-20 | End: 2018-11-20

## 2018-11-20 RX ORDER — SODIUM CHLORIDE 0.9 % (FLUSH) 0.9 %
3-10 SYRINGE (ML) INJECTION AS NEEDED
Status: DISCONTINUED | OUTPATIENT
Start: 2018-11-20 | End: 2018-11-24 | Stop reason: HOSPADM

## 2018-11-20 RX ADMIN — MORPHINE SULFATE 4 MG: 4 INJECTION INTRAVENOUS at 12:49

## 2018-11-20 RX ADMIN — LATANOPROST 1 DROP: 50 SOLUTION/ DROPS OPHTHALMIC at 20:02

## 2018-11-20 RX ADMIN — IOPAMIDOL 96 ML: 755 INJECTION, SOLUTION INTRAVENOUS at 12:02

## 2018-11-20 RX ADMIN — SODIUM CHLORIDE, PRESERVATIVE FREE 3 ML: 5 INJECTION INTRAVENOUS at 20:02

## 2018-11-20 NOTE — H&P (VIEW-ONLY)
Orthopaedic surg  Chief Complaint   Patient presents with   • Syncope   • Fall     Hip Pain: Patient complains of left hip pain. Onset of the symptoms was 12 hours ago. Inciting event: twisted while /. Current symptoms include is worse with weight bearing. Associated symptoms: injury, knee giving out, crepitus sensation. Aggravating symptoms: lateral movements, rising after sitting, standing and walking. Patient's course of pain: began worsening 12 hours ago. Patient has had prior hip problems. Previous visits for this problem: none. Evaluation to date: plain films, which were fracture.  Treatment to date: prescription analgesics, which have been effective.  Fell down  Review neg fever/ chills, nightsweats, eye/ear neg, cardiac/ pulm neg, gi/ gu neg, derm neg, no active bruising or bleeding, no swelling, c/o pain left hip, neg numbness or weakness  Neg psych    Past Medical History:   Diagnosis Date   • Abdominal aortic aneurysm (CMS/[facility])    • Carotid stenosis, bilateral    • Cataract    • Emphysema of lung (CMS/[facility])    • Herpes zoster    • Hyperlipidemia    • Hypertension      No current facility-administered medications on file prior to encounter.      Current Outpatient Medications on File Prior to Encounter   Medication Sig Dispense Refill   • aspirin 81 MG chewable tablet Chew 81 mg Daily.     • latanoprost (XALATAN) 0.005 % ophthalmic solution Administer 1 drop into the left eye Every Night.     • lisinopril (PRINIVIL,ZESTRIL) 20 MG tablet Take 1 tablet by mouth Daily. 90 tablet 3   • Multiple Vitamins-Minerals (VISION-HECTOR PRESERVE PO) Take 1 capsule by mouth 2 (Two) Times a Day. PreserVision Lutein 226 mg-200 unit-5 mg-0.8 mg capsule      • simvastatin (ZOCOR) 20 MG tablet Take 1 tablet by mouth Every Night. Bedtime 90 tablet 3   • varenicline (CHANTIX CONTINUING MONTH PAK) 1 MG tablet Take 1 tablet by mouth 2 (Two) Times a Day. 60 tablet 3   • varenicline (CHANTIX STARTING MONTH PAK) 0.5 MG X 11 & 1 MG X 42  tablet Take 0.5 mg one daily on days 1-3 and and 0.5 mg twice daily on days 4-7.Then 1 mg twice daily for a total of 12 weeks. 53 tablet 0     Past Surgical History:   Procedure Laterality Date   • CARDIAC CATHETERIZATION     • COLONOSCOPY W/ POLYPECTOMY     • HAND SURGERY     No Known Allergies   Social History     Socioeconomic History   • Marital status:      Spouse name: Not on file   • Number of children: Not on file   • Years of education: Not on file   • Highest education level: Not on file   Social Needs   • Financial resource strain: Not on file   • Food insecurity - worry: Not on file   • Food insecurity - inability: Not on file   • Transportation needs - medical: Not on file   • Transportation needs - non-medical: Not on file   Occupational History   • Not on file   Tobacco Use   • Smoking status: Current Every Day Smoker     Packs/day: 1.00     Years: 60.00     Pack years: 60.00     Types: Cigarettes   • Smokeless tobacco: Former User   Substance and Sexual Activity   • Alcohol use: No   • Drug use: No   • Sexual activity: Not Currently   Other Topics Concern   • Not on file   Social History Narrative   • Not on file     Family History   Problem Relation Age of Onset   • Breast cancer Mother    • Hypertension Father      Vitals:    11/20/18 1724   BP: 123/58   Pulse: 79   Resp: 18   Temp: 98.7 °F (37.1 °C)   SpO2: 97%     Elderly, alert and oriented  P[place]la, no bruising or swelling  No jugular venous distention, neck alignment normal  Breathing comfortably  No wheezes  Abdomen soft, no masses  Pulse present in the extremities  Brisk capillary refill  No joint swelling, no rash  Skin warm and dry  No bruising,   No hyperreflexia, no clonus, CN II-Xii intact      Lab Results (last 24 hours)     Procedure Component Value Units Date/Time    Urinalysis, Microscopic Only - Urine, Catheter [[id number]]  (Abnormal) Collected:  11/20/18 1320    Specimen:  Urine, Catheter Updated:  11/20/18 1407     RBC, UA  3-5 /HPF      WBC, UA 21-30 /HPF      Bacteria, UA None Seen /HPF      Squamous Epithelial Cells, UA 6-12 /HPF      Hyaline Casts, UA 21-30 /LPF      Methodology Manual Light Microscopy    Urine Culture - Urine, Urine, Catheter [[id number]] Collected:  11/20/18 1320    Specimen:  Urine, Catheter Updated:  11/20/18 1407    Urinalysis With Culture If Indicated - Urine, Catheter [[id number]]  (Abnormal) Collected:  11/20/18 1320    Specimen:  Urine, Catheter Updated:  11/20/18 1342     Color, UA Yellow     Appearance, UA Cloudy     pH, UA 5.5     Specific Gravity, UA 1.020     Glucose, UA Negative     Ketones, UA Negative     Bilirubin, UA Negative     Blood, UA Trace     Protein,  mg/dL (2+)     Leuk Esterase, UA Negative     Nitrite, UA Negative     Urobilinogen, UA 0.2 E.U./dL    Protime-INR [[id number]]  (Normal) Collected:  11/20/18 1048    Specimen:  Blood Updated:  11/20/18 1304     Protime 14.0 Seconds      INR 1.10    Narrative:       Therapeutic range for most indications is 2.0-3.0 INR,  or 2.5-3.5 for mechanical heart valves.    aPTT [[id number]]  (Normal) Collected:  11/20/18 1048    Specimen:  Blood Updated:  11/20/18 1304     PTT 33.3 seconds     Narrative:       The recommended Heparin therapeutic range is 68-97 seconds.    [place] Draw [[id number]] Collected:  11/20/18 1048    Specimen:  Blood Updated:  11/20/18 1200    Narrative:       The following orders were created for panel order [place] Draw.  Procedure                               Abnormality         Status                     ---------                               -----------         ------                     Light Blue Top[[id number]]                                   Final result               Green Top (Gel)[[id number]]                                  Final result               Lavender Top[[id number]]                                     Final result               Gold Top - SST[[id number]]                                   Final result                  Please view results for these tests on the individual orders.    Light Blue Top [[id number]] Collected:  11/20/18 1048    Specimen:  Blood Updated:  11/20/18 1200     Extra Tube hold for add-on     Comment: Auto resulted       Green Top (Gel) [[id number]] Collected:  11/20/18 1048    Specimen:  Blood Updated:  11/20/18 1200     Extra Tube Hold for add-ons.     Comment: Auto resulted.       Lavender Top [[id number]] Collected:  11/20/18 1048    Specimen:  Blood Updated:  11/20/18 1200     Extra Tube hold for add-on     Comment: Auto resulted       Gold Top - SST [[id number]] Collected:  11/20/18 1048    Specimen:  Blood Updated:  11/20/18 1200     Extra Tube Hold for add-ons.     Comment: Auto resulted.       Troponin [[id number]]  (Normal) Collected:  11/20/18 1048    Specimen:  Blood Updated:  11/20/18 1126     Troponin I <0.012 ng/mL     Comprehensive Metabolic Panel [[id number]]  (Abnormal) Collected:  11/20/18 1048    Specimen:  Blood Updated:  11/20/18 1115     Glucose 132 mg/dL      BUN 20 mg/dL      Creatinine 1.20 mg/dL      Sodium 136 mmol/L      Potassium 3.8 mmol/L      Chloride 102 mmol/L      CO2 26.0 mmol/L      Calcium 8.7 mg/dL      Total Protein 6.5 g/dL      Albumin 3.70 g/dL      ALT (SGPT) 18 U/L      AST (SGOT) 19 U/L      Alkaline Phosphatase 68 U/L      Total Bilirubin 1.1 mg/dL      eGFR Non African Amer 58 mL/min/1.73      Globulin 2.8 gm/dL      A/G Ratio 1.3 g/dL      BUN/Creatinine Ratio 16.7     Anion Gap 8.0 mmol/L     Narrative:       The MDRD GFR formula is only valid for adults with stable renal function between ages 18 and 70.    Magnesium [[id number]]  (Normal) Collected:  11/20/18 1048    Specimen:  Blood Updated:  11/20/18 1115     Magnesium 2.0 mg/dL     CBC & Differential [[id number]] Collected:  11/20/18 1048    Specimen:  Blood Updated:  11/20/18 1058    Narrative:       The following orders were created for panel order CBC & Differential.  Procedure                                Abnormality         Status                     ---------                               -----------         ------                     CBC Auto Differential[[id number]]        Abnormal            Final result                 Please view results for these tests on the individual orders.    CBC Auto Differential [[id number]]  (Abnormal) Collected:  11/20/18 1048    Specimen:  Blood Updated:  11/20/18 1058     WBC 17.16 10*3/mm3      RBC 4.06 10*6/mm3      Hemoglobin 12.7 g/dL      Hematocrit 36.6 %      MCV 90.1 fL      MCH 31.3 pg      MCHC 34.7 g/dL      RDW 12.5 %      RDW-SD 41.5 fl      MPV 8.6 fL      Platelets 227 10*3/mm3      Neutrophil % 83.2 %      Lymphocyte % 12.0 %      Monocyte % 3.8 %      Eosinophil % 0.5 %      Basophil % 0.2 %      Immature Grans % 0.3 %      Neutrophils, Absolute 14.29 10*3/mm3      Lymphocytes, Absolute 2.06 10*3/mm3      Monocytes, Absolute 0.65 10*3/mm3      Eosinophils, Absolute 0.08 10*3/mm3      Basophils, Absolute 0.03 10*3/mm3      Immature Grans, Absolute 0.05 10*3/mm3         Imaging Results (last 24 hours)     Procedure Component Value Units Date/Time    XR Femur 2 View Left [[id number]] Collected:  11/20/18 1257     Updated:  11/20/18 1326    Narrative:         EXAM:  Radiograph(s), Osseous         REGION:   Femur    SIDE:     Left     VIEWS:   2             INDICATION:    femur, S72.142A Displaced intertrochanteric  fracture of left femur, initial encounter for closed fracture  R91.8 Other nonspecific abnormal finding of lung field S72.22XA  Displaced subtrochanteric fracture of left femur, initial  encounter for closed fracture R55 Syncope and collapse     COMPARISON:    none              FINDINGS:           Minimally displaced intertrochanteric fracture.  .        Impression:       CONCLUSION:           1. Minimally displaced intertrochanteric fracture.                             Electronically signed by:  TRE Giraldo MD  11/20/2018 1:25  PM CST  Workstation: [id number]    CT Angiogram Aorta [[id number]] Collected:  11/20/18 1158     Updated:  11/20/18 1254    Narrative:       Procedure: CT angiogram abdominal aorta with contrast    Reason for exam: Syncope, suspect AAA.    FINDINGS: Axial computer tomography sequential imaging was  performed from the diaphragms through the symphysis pubis after  dynamic bolus contrast injection. Sagittal and coronal  reformation was performed. This exam was performed according to  our departmental dose optimization program, which includes  automated exposure control, adjustment of the mA and/or KV  according to patient size and/or use of iterative reconstruction  technique. 3-D vascular reconstruction of the abdominal aorta was  performed.    CTA findings: Proximal abdominal aortic ectasia measuring 2.5 x  2.4 cm in AP and transverse dimension. No evidence of abdominal  aortic aneurysm. No evidence of aortic dissection. Abdominal  aorta soft and calcified atherosclerotic plaque are seen none of  which appear hemodynamically significant. The celiac trunk is  normal. Proximal superior mesenteric artery partially calcified  atherosclerotic plaque which is causing 5-10% diameter stenosis.  The superior mesenteric artery is otherwise normal. The inferior  mesenteric artery is normal. The right renal artery is normal.  The left renal artery is normal. The right common iliac artery  has small foci of calcified atherosclerotic plaque none of which  appear hemodynamically significant. The right internal iliac  artery has proximal small calcified atherosclerotic plaque not  hemodynamically significant. The right external iliac artery is  normal. Proximal right common femoral artery partially calcified  atherosclerotic plaque which is causing 30% diameter stenosis.  Otherwise the right common femoral artery is unremarkable.  Proximal right deep profunda artery and right superficial femoral  artery are normal. The left common iliac artery  distal soft  atherosclerotic plaque which is causing 40-50% diameter stenosis.  The left internal iliac artery has a few proximal calcified  atherosclerotic plaque which are not hemodynamically significant.  The left external iliac artery appears normal. The left common  femoral artery has soft atherosclerotic plaque which is causing  40-50% diameter stenosis. Proximal left deep profunda artery soft  and calcified atherosclerotic plaque which is causing significant  stenosis of 80% or greater. Proximal left superficial femoral  artery appears normal.    The lung bases are unremarkable. Multiple left lobe of liver and  single posterior segment right lobe of liver small benign simple  hepatic cyst. Otherwise the liver is unremarkable. The  gallbladder is normal. The biliary system is normal. The pancreas  is normal. The spleen is normal. Bilateral adrenal glands are  normal. Right kidney mid zone small subcentimeter simple renal  cortical cyst. Right kidney lower pole circular hypodense lesion  which has Hounsfield units 28. This most likely represents benign  hyperdense cyst. However, recommend follow-up CT in 3-6  months  to document stability. Otherwise right kidney and ureter are  normal. Left kidney upper pole simple renal cortical cysts which  measures 2.9 cm in greatest diameter. Right kidney mid zone small  subcentimeter simple renal cortical cyst. Otherwise left kidney  and ureter are normal. The bladder is normal. The prostate gland  appears enlarged and is impinging upon the base of bladder.  Multiple sigmoid colon diverticula. Multiple descending colon  diverticula. Otherwise hollow viscera is unremarkable. No  lymphadenopathy in the abdomen or the pelvis. Left femur  comminuted intertrochanteric fracture. Otherwise no acute osseous  abnormality.      Impression:       1.  CTA findings as described above.  2.  Multiple liver benign hepatic cyst.  3.  Right kidney lower pole circular hypodense lesion which  has  Hounsfield units of 28. This most likely represents benign  hyperdense cyst. However, recommend follow-up CT in 3-6 months to  document stability.  4.  Additional bilateral kidney simple renal cortical cyst.  5.  Mildly enlarged prostate gland may represent changes from  benign prostatic hypertrophy versus prostate malignancy.  Recommend clinical correlation.  6.  Left femur comminuted intertrochanteric fracture.  7.  Colonic diverticulosis.    Electronically signed by:  Tulio Melendrez MD  11/20/2018 12:51 PM CST  Workstation: [id number]    CT Head Without Contrast [[id number]] Collected:  11/20/18 1113     Updated:  11/20/18 1206    Narrative:         CT Head Without Contrast    History: Syncope    Axial scans of the brain were obtained without intravenous  contrast.  Coronal and sagital reconstructions were preformed.    This exam was performed according to our departmental  dose-optimization program, which includes automated exposure  control, adjustment of the mA and/or kV according to patient size  and/or use of iterative reconstruction technique.    DLP: 908.20    Comparison: April 24, 2017    Bone windows are unremarkable.  The visualized paranasal sinuses are unremarkable.    No acute process.  Cerebral atrophy.  Minimal small vessel disease.  No hemorrhage.  No mass.  No abnormal areas of increased attenuation.  No midline shift.  No abnormal extra-axial fluid collections.      Impression:       CONCLUSION:  No acute process.  Cerebral atrophy.  Minimal small vessel disease.    [id number]    Electronically signed by:  Jose Handley MD  11/20/2018 11:54 AM  CST Workstation: [id number]    XR Hip With or Without Pelvis 2 - 3 View Left [[id number]] Collected:  11/20/18 1052     Updated:  11/20/18 1206    Narrative:       Procedure: XR HIP 2 OR MORE VIEWS UNILATERAL WITH PELVIS.    History: fall, left hip pain, inability to bear weight.    Comparison: None    Findings:  Frontal view of the pelvis and two views of the left  hip were  obtained, demonstrating a displaced, possibly comminuted,  medially angulated intertrochanteric fracture of the left  proximal femur. No other fractures are seen. There are  degenerative changes of the lower lumbar spine. The bones appear  demineralized.      Impression:       Impression: Displaced, possibly comminuted, medially angulated  intertrochanteric fracture of the left proximal femur.     Electronically signed by:  Tone Olivas MD  11/20/2018 11:47 AM  CST Workstation: [id number]    XR Chest 1 View [[id number]] Collected:  11/20/18 1102     Updated:  11/20/18 1145    Narrative:         EXAM:         Radiograph(s), Chest   VIEWS:   Frontal  ; 1       DATE/TIME:  11/20/2018 11:43 AM CST                INDICATION:   pre op, hip fracture    COMPARISON:  CXR: none             FINDINGS:             - lines/tubes:    none     - cardiac:         size within normal limits         - mediastinum: Fullness in the right paratracheal and right  suprahilar regions, suspicious for the presence of a mass lesion.          - lungs:         no focal air space process, pulmonary  interstitial edema, nodule(s)/mass             - pleura:         no evidence of  fluid                  - osseous:         unremarkable for age                  - misc.:         Impression:       CONCLUSION:        1. Mass lesion suspected present in the right suprahilar region.  Correlation with contrast-enhanced CT recommended.                                                              Electronically signed by:  TRE Giraldo MD  11/20/2018 11:44  AM CST Workstation: [id number]          Xray left hip transtrochanteric femur fracture, displaced     Diagnosis Plan   1. Closed displaced intertrochanteric fracture of left femur, initial encounter (CMS/[facility])     2. Mass of right lung     3. Closed displaced subtrochanteric fracture of left femur, initial encounter (CMS/[facility])  Case Request    Case Request   4. Syncope, unspecified syncope type           Recommend surgery for fixation of /L femur fracture, no good option for non operative treatment.  Surgery will require mechanical fixation, described to him.  Risks of surgery include deep or superficial infection hematoma, wound healing complications malpositioning of instrumentation, loosening, non union, malunion, persistent pain, and additional surgery/ revision surgery and blood transfusion.  Nerve or vascular injury is possible.  Medical risks of anesthesia include death, heart attack heart arrhythmia, heart failure stroke gastric ulceration perforation peritonitis deep vein thrombosis pulmonary embolus pneumonia pulmonary failure necessitating prolonged ventilation renal failure sepsis and multiorgan system failure.  All risks have been reviewed extensively.  There are no guarantees.  Surgery is posted for scheduling, canceled by anesthesia service due to risk for stroke.

## 2018-11-20 NOTE — ANESTHESIA PREPROCEDURE EVALUATION
Anesthesia Evaluation     Patient summary reviewed and Nursing notes reviewed   no history of anesthetic complications:  NPO Solid Status: > 8 hours  NPO Liquid Status: > 8 hours           Airway   Mallampati: II  TM distance: >3 FB  Neck ROM: full  Possible difficult intubation  Dental    (+) lower dentures and upper dentures    Pulmonary    (+) a smoker (1 PPD) Current Smoked day of surgery, COPD moderate, decreased breath sounds, wheezes,     ROS comment:      FINDINGS:             - lines/tubes:    none     - cardiac:         size within normal limits         - mediastinum: Fullness in the right paratracheal and right  suprahilar regions, suspicious for the presence of a mass lesion.          - lungs:         no focal air space process, pulmonary  interstitial edema, nodule(s)/mass             - pleura:         no evidence of  fluid                  - osseous:         unremarkable for age                  - misc.:       IMPRESSION:  CONCLUSION:        1. Mass lesion suspected present in the right suprahilar region.  Correlation with contrast-enhanced CT recommended.                                                               Electronically signed by:  TRE Giraldo MD  11/20/2018 11:44  AM CST Workstation: [id number]  Cardiovascular   Exercise tolerance: good (4-7 METS)    ECG reviewed  Rhythm: regular  Rate: normal    (+) hypertension, murmur, carotid bruits (Left.), PVD, hyperlipidemia,  carotid artery disease carotid bilateral  (-) past MI, angina    ROS comment: EF 50-55%Date/Time: 11/20/2018 10:16 AM  Performed by: Thomas Jones PA-C  Authorized by: Prabhu Jones MD   Interpreted by physician  Comparison: compared with previous ECG from 12/30/2016  Similar to previous ECG  Rhythm: sinus rhythm  Rate: normal  BPM: 75  Conduction: complete LBBB and 1st degree  Interpreted by Thomas Jones PA-C on 11/20/2018  1:13 PM        Neuro/Psych- negative ROS    ROS Comment: Patient  "underwent left carotid endarterectomy \"couple of years ago\". Wife reports she found her  this AM in kitchen after \"falling\" from his chair or \"slipping\" on slick floor.   GI/Hepatic/Renal/Endo - negative ROS     Musculoskeletal     (+) back pain,   Abdominal  - normal exam   Substance History - negative use     OB/GYN negative ob/gyn ROS         Other   (+) arthritis     (-) history of cancer                  Anesthesia Plan    ASA 4 - emergent     general   (Will discuss physical findings and chest x-ray results from exam in the ER with Dr. Hare. Results and findings discussed with surgeon in OR. Doppler of carotids ordered by Hospital Physician. Findings from bilateral ultrasound revealed significant carotid and vertebral artery stenosis. Discussed with Dr. Hare who decided to postpone surgery for further evaluation of carotid artery stenosis. Explained to family the findings and postponement who understand and agree. Dr. Hare to followup with family and medical care providers.)  intravenous induction   Anesthetic plan, all risks, benefits, and alternatives have been provided, discussed and informed consent has been obtained with: patient, spouse/significant other and child.      "

## 2018-11-20 NOTE — H&P
History and Physical  Kyrie Mcdonald MD  Hospitalist    Date of admission: 11/20/2018    Patient Care Team:  Debbi Jackson MD as PCP - General    Chief complaint   Chief Complaint   Patient presents with   • L sided hip pain after a    • Fall     Subjective     Patient is a 83 y.o. male admitted for a fall sustained at home. He claims to have felt dizzy, maybe even have a near syncope severe enough to fall and injure his L hip.    History  Past Medical History:   Diagnosis Date   • Abdominal aortic aneurysm (CMS/HCC)    • Carotid stenosis, bilateral    • Cataract    • Emphysema of lung (CMS/HCC)    • Herpes zoster    • Hyperlipidemia    • Hypertension      Past Surgical History:   Procedure Laterality Date   • CARDIAC CATHETERIZATION     • COLONOSCOPY W/ POLYPECTOMY     • HAND SURGERY       Family History   Problem Relation Age of Onset   • Breast cancer Mother    • Hypertension Father      Social History     Tobacco Use   • Smoking status: Current Every Day Smoker     Packs/day: 1.00     Years: 60.00     Pack years: 60.00     Types: Cigarettes   • Smokeless tobacco: Former User   Substance Use Topics   • Alcohol use: No   • Drug use: No     Medications Prior to Admission   Medication Sig Dispense Refill Last Dose   • aspirin 81 MG chewable tablet Chew 81 mg Daily.   Taking   • latanoprost (XALATAN) 0.005 % ophthalmic solution Administer 1 drop into the left eye Every Night.   Taking   • lisinopril (PRINIVIL,ZESTRIL) 20 MG tablet Take 1 tablet by mouth Daily. 90 tablet 3 Taking   • Multiple Vitamins-Minerals (VISION-HECTOR PRESERVE PO) Take 1 capsule by mouth 2 (Two) Times a Day. PreserVision Lutein 226 mg-200 unit-5 mg-0.8 mg capsule    Taking   • simvastatin (ZOCOR) 20 MG tablet Take 1 tablet by mouth Every Night. Bedtime 90 tablet 3 Taking   • varenicline (CHANTIX CONTINUING MONTH PAK) 1 MG tablet Take 1 tablet by mouth 2 (Two) Times a Day. 60 tablet 3    • varenicline (CHANTIX STARTING MONTH PAK) 0.5 MG X 11 &  1 MG X 42 tablet Take 0.5 mg one daily on days 1-3 and and 0.5 mg twice daily on days 4-7.Then 1 mg twice daily for a total of 12 weeks. 53 tablet 0      Allergies:  Patient has no known allergies.    Review of Systems  Review of Systems   Constitutional: Positive for fatigue.   Respiratory: Negative for cough, shortness of breath and wheezing.    Cardiovascular: Negative for chest pain and leg swelling.   Gastrointestinal: Negative for abdominal distention, abdominal pain and anal bleeding.   Genitourinary: Negative for dysuria, frequency, penile pain and urgency.   Musculoskeletal: Positive for arthralgias and back pain. Negative for gait problem and joint swelling.   Skin: Positive for pallor.   Neurological: Positive for dizziness, weakness and light-headedness. Negative for seizures, syncope and numbness.   Psychiatric/Behavioral: Negative for agitation, behavioral problems and confusion.   All other systems reviewed and are negative.      Objective     Vital Signs  Temp:  [96.2 °F (35.7 °C)-96.9 °F (36.1 °C)] 96.9 °F (36.1 °C)  Heart Rate:  [66-92] 92  Resp:  [18] 18  BP: (116-171)/(58-82) 124/82    Physical Exam:  Physical Exam   Constitutional: He is oriented to person, place, and time. He appears well-developed and well-nourished. No distress.   HENT:   Head: Normocephalic and atraumatic.   Eyes: EOM are normal. Pupils are equal, round, and reactive to light. No scleral icterus.   Neck: Normal range of motion. Neck supple.   Cardiovascular: Normal rate and regular rhythm.   Pulmonary/Chest: Effort normal and breath sounds normal. No stridor. No respiratory distress. He has no wheezes.   Abdominal: Soft. Bowel sounds are normal. He exhibits no distension. There is no tenderness.   Musculoskeletal: He exhibits tenderness (L sided hip pain). He exhibits no edema.   Neurological: He is alert and oriented to person, place, and time.   Skin: Skin is warm and dry. There is pallor.   Psychiatric: He has a normal  mood and affect. His behavior is normal.   Vitals reviewed.      Results Review:   Lab Results (last 24 hours)     Procedure Component Value Units Date/Time    Urinalysis, Microscopic Only - Urine, Catheter [[id number]]  (Abnormal) Collected:  11/20/18 1320    Specimen:  Urine, Catheter Updated:  11/20/18 1407     RBC, UA 3-5 /HPF      WBC, UA 21-30 /HPF      Bacteria, UA None Seen /HPF      Squamous Epithelial Cells, UA 6-12 /HPF      Hyaline Casts, UA 21-30 /LPF      Methodology Manual Light Microscopy    Urine Culture - Urine, Urine, Catheter [[id number]] Collected:  11/20/18 1320    Specimen:  Urine, Catheter Updated:  11/20/18 1407    Urinalysis With Culture If Indicated - Urine, Catheter [[id number]]  (Abnormal) Collected:  11/20/18 1320    Specimen:  Urine, Catheter Updated:  11/20/18 1342     Color, UA Yellow     Appearance, UA Cloudy     pH, UA 5.5     Specific Gravity, UA 1.020     Glucose, UA Negative     Ketones, UA Negative     Bilirubin, UA Negative     Blood, UA Trace     Protein,  mg/dL (2+)     Leuk Esterase, UA Negative     Nitrite, UA Negative     Urobilinogen, UA 0.2 E.U./dL    Protime-INR [[id number]]  (Normal) Collected:  11/20/18 1048    Specimen:  Blood Updated:  11/20/18 1304     Protime 14.0 Seconds      INR 1.10    Narrative:       Therapeutic range for most indications is 2.0-3.0 INR,  or 2.5-3.5 for mechanical heart valves.    aPTT [[id number]]  (Normal) Collected:  11/20/18 1048    Specimen:  Blood Updated:  11/20/18 1304     PTT 33.3 seconds     Narrative:       The recommended Heparin therapeutic range is 68-97 seconds.    [place] Draw [[id number]] Collected:  11/20/18 1048    Specimen:  Blood Updated:  11/20/18 1200    Narrative:       The following orders were created for panel order [place] Draw.  Procedure                               Abnormality         Status                     ---------                               -----------         ------                     Light Blue  Top[[id number]]                                   Final result               Green Top (Gel)[[id number]]                                  Final result               Lavender Top[[id number]]                                     Final result               Gold Top - SST[[id number]]                                   Final result                 Please view results for these tests on the individual orders.    Light Blue Top [[id number]] Collected:  11/20/18 1048    Specimen:  Blood Updated:  11/20/18 1200     Extra Tube hold for add-on     Comment: Auto resulted       Green Top (Gel) [[id number]] Collected:  11/20/18 1048    Specimen:  Blood Updated:  11/20/18 1200     Extra Tube Hold for add-ons.     Comment: Auto resulted.       Lavender Top [[id number]] Collected:  11/20/18 1048    Specimen:  Blood Updated:  11/20/18 1200     Extra Tube hold for add-on     Comment: Auto resulted       Gold Top - SST [[id number]] Collected:  11/20/18 1048    Specimen:  Blood Updated:  11/20/18 1200     Extra Tube Hold for add-ons.     Comment: Auto resulted.       Troponin [[id number]]  (Normal) Collected:  11/20/18 1048    Specimen:  Blood Updated:  11/20/18 1126     Troponin I <0.012 ng/mL     Comprehensive Metabolic Panel [[id number]]  (Abnormal) Collected:  11/20/18 1048    Specimen:  Blood Updated:  11/20/18 1115     Glucose 132 mg/dL      BUN 20 mg/dL      Creatinine 1.20 mg/dL      Sodium 136 mmol/L      Potassium 3.8 mmol/L      Chloride 102 mmol/L      CO2 26.0 mmol/L      Calcium 8.7 mg/dL      Total Protein 6.5 g/dL      Albumin 3.70 g/dL      ALT (SGPT) 18 U/L      AST (SGOT) 19 U/L      Alkaline Phosphatase 68 U/L      Total Bilirubin 1.1 mg/dL      eGFR Non African Amer 58 mL/min/1.73      Globulin 2.8 gm/dL      A/G Ratio 1.3 g/dL      BUN/Creatinine Ratio 16.7     Anion Gap 8.0 mmol/L     Narrative:       The MDRD GFR formula is only valid for adults with stable renal function between ages 18 and 70.    Magnesium [[id number]]   (Normal) Collected:  11/20/18 1048    Specimen:  Blood Updated:  11/20/18 1115     Magnesium 2.0 mg/dL     CBC & Differential [[id number]] Collected:  11/20/18 1048    Specimen:  Blood Updated:  11/20/18 1058    Narrative:       The following orders were created for panel order CBC & Differential.  Procedure                               Abnormality         Status                     ---------                               -----------         ------                     CBC Auto Differential[[id number]]        Abnormal            Final result                 Please view results for these tests on the individual orders.    CBC Auto Differential [[id number]]  (Abnormal) Collected:  11/20/18 1048    Specimen:  Blood Updated:  11/20/18 1058     WBC 17.16 10*3/mm3      RBC 4.06 10*6/mm3      Hemoglobin 12.7 g/dL      Hematocrit 36.6 %      MCV 90.1 fL      MCH 31.3 pg      MCHC 34.7 g/dL      RDW 12.5 %      RDW-SD 41.5 fl      MPV 8.6 fL      Platelets 227 10*3/mm3      Neutrophil % 83.2 %      Lymphocyte % 12.0 %      Monocyte % 3.8 %      Eosinophil % 0.5 %      Basophil % 0.2 %      Immature Grans % 0.3 %      Neutrophils, Absolute 14.29 10*3/mm3      Lymphocytes, Absolute 2.06 10*3/mm3      Monocytes, Absolute 0.65 10*3/mm3      Eosinophils, Absolute 0.08 10*3/mm3      Basophils, Absolute 0.03 10*3/mm3      Immature Grans, Absolute 0.05 10*3/mm3           Imaging Results (last 24 hours)     Procedure Component Value Units Date/Time    XR Femur 2 View Left [[id number]] Collected:  11/20/18 1257     Updated:  11/20/18 1326    Narrative:         EXAM:  Radiograph(s), Osseous         REGION:   Femur    SIDE:     Left     VIEWS:   2             INDICATION:    femur, S72.142A Displaced intertrochanteric  fracture of left femur, initial encounter for closed fracture  R91.8 Other nonspecific abnormal finding of lung field S72.22XA  Displaced subtrochanteric fracture of left femur, initial  encounter for closed fracture R55  Syncope and collapse     COMPARISON:    none              FINDINGS:           Minimally displaced intertrochanteric fracture.  .        Impression:       CONCLUSION:           1. Minimally displaced intertrochanteric fracture.                             Electronically signed by:  TRE Giraldo MD  11/20/2018 1:25  PM CST Workstation: [id number]    CT Angiogram Aorta [[id number]] Collected:  11/20/18 1158     Updated:  11/20/18 1251    Narrative:       Procedure: CT angiogram abdominal aorta with contrast    Reason for exam: Syncope, suspect AAA.    FINDINGS: Axial computer tomography sequential imaging was  performed from the diaphragms through the symphysis pubis after  dynamic bolus contrast injection. Sagittal and coronal  reformation was performed. This exam was performed according to  our departmental dose optimization program, which includes  automated exposure control, adjustment of the mA and/or KV  according to patient size and/or use of iterative reconstruction  technique. 3-D vascular reconstruction of the abdominal aorta was  performed.    CTA findings: Proximal abdominal aortic ectasia measuring 2.5 x  2.4 cm in AP and transverse dimension. No evidence of abdominal  aortic aneurysm. No evidence of aortic dissection. Abdominal  aorta soft and calcified atherosclerotic plaque are seen none of  which appear hemodynamically significant. The celiac trunk is  normal. Proximal superior mesenteric artery partially calcified  atherosclerotic plaque which is causing 5-10% diameter stenosis.  The superior mesenteric artery is otherwise normal. The inferior  mesenteric artery is normal. The right renal artery is normal.  The left renal artery is normal. The right common iliac artery  has small foci of calcified atherosclerotic plaque none of which  appear hemodynamically significant. The right internal iliac  artery has proximal small calcified atherosclerotic plaque not  hemodynamically significant. The right  external iliac artery is  normal. Proximal right common femoral artery partially calcified  atherosclerotic plaque which is causing 30% diameter stenosis.  Otherwise the right common femoral artery is unremarkable.  Proximal right deep profunda artery and right superficial femoral  artery are normal. The left common iliac artery distal soft  atherosclerotic plaque which is causing 40-50% diameter stenosis.  The left internal iliac artery has a few proximal calcified  atherosclerotic plaque which are not hemodynamically significant.  The left external iliac artery appears normal. The left common  femoral artery has soft atherosclerotic plaque which is causing  40-50% diameter stenosis. Proximal left deep profunda artery soft  and calcified atherosclerotic plaque which is causing significant  stenosis of 80% or greater. Proximal left superficial femoral  artery appears normal.    The lung bases are unremarkable. Multiple left lobe of liver and  single posterior segment right lobe of liver small benign simple  hepatic cyst. Otherwise the liver is unremarkable. The  gallbladder is normal. The biliary system is normal. The pancreas  is normal. The spleen is normal. Bilateral adrenal glands are  normal. Right kidney mid zone small subcentimeter simple renal  cortical cyst. Right kidney lower pole circular hypodense lesion  which has Hounsfield units 28. This most likely represents benign  hyperdense cyst. However, recommend follow-up CT in 3-6  months  to document stability. Otherwise right kidney and ureter are  normal. Left kidney upper pole simple renal cortical cysts which  measures 2.9 cm in greatest diameter. Right kidney mid zone small  subcentimeter simple renal cortical cyst. Otherwise left kidney  and ureter are normal. The bladder is normal. The prostate gland  appears enlarged and is impinging upon the base of bladder.  Multiple sigmoid colon diverticula. Multiple descending colon  diverticula. Otherwise  hollow viscera is unremarkable. No  lymphadenopathy in the abdomen or the pelvis. Left femur  comminuted intertrochanteric fracture. Otherwise no acute osseous  abnormality.      Impression:       1.  CTA findings as described above.  2.  Multiple liver benign hepatic cyst.  3.  Right kidney lower pole circular hypodense lesion which has  Hounsfield units of 28. This most likely represents benign  hyperdense cyst. However, recommend follow-up CT in 3-6 months to  document stability.  4.  Additional bilateral kidney simple renal cortical cyst.  5.  Mildly enlarged prostate gland may represent changes from  benign prostatic hypertrophy versus prostate malignancy.  Recommend clinical correlation.  6.  Left femur comminuted intertrochanteric fracture.  7.  Colonic diverticulosis.    Electronically signed by:  Tulio Melendrez MD  11/20/2018 12:51 PM CST  Workstation: [id number]    CT Head Without Contrast [[id number]] Collected:  11/20/18 1113     Updated:  11/20/18 1206    Narrative:         CT Head Without Contrast    History: Syncope    Axial scans of the brain were obtained without intravenous  contrast.  Coronal and sagital reconstructions were preformed.    This exam was performed according to our departmental  dose-optimization program, which includes automated exposure  control, adjustment of the mA and/or kV according to patient size  and/or use of iterative reconstruction technique.    DLP: 908.20    Comparison: April 24, 2017    Bone windows are unremarkable.  The visualized paranasal sinuses are unremarkable.    No acute process.  Cerebral atrophy.  Minimal small vessel disease.  No hemorrhage.  No mass.  No abnormal areas of increased attenuation.  No midline shift.  No abnormal extra-axial fluid collections.      Impression:       CONCLUSION:  No acute process.  Cerebral atrophy.  Minimal small vessel disease.    [id number]    Electronically signed by:  Jose Handley MD  11/20/2018 11:54 AM  CST Workstation: [id number]     XR Hip With or Without Pelvis 2 - 3 View Left [[id number]] Collected:  11/20/18 1052     Updated:  11/20/18 1206    Narrative:       Procedure: XR HIP 2 OR MORE VIEWS UNILATERAL WITH PELVIS.    History: fall, left hip pain, inability to bear weight.    Comparison: None    Findings:  Frontal view of the pelvis and two views of the left hip were  obtained, demonstrating a displaced, possibly comminuted,  medially angulated intertrochanteric fracture of the left  proximal femur. No other fractures are seen. There are  degenerative changes of the lower lumbar spine. The bones appear  demineralized.      Impression:       Impression: Displaced, possibly comminuted, medially angulated  intertrochanteric fracture of the left proximal femur.     Electronically signed by:  Tone Olivas MD  11/20/2018 11:47 AM  CST Workstation: [id number]    XR Chest 1 View [[id number]] Collected:  11/20/18 1102     Updated:  11/20/18 1145    Narrative:         EXAM:         Radiograph(s), Chest   VIEWS:   Frontal  ; 1       DATE/TIME:  11/20/2018 11:43 AM CST                INDICATION:   pre op, hip fracture    COMPARISON:  CXR: none             FINDINGS:             - lines/tubes:    none     - cardiac:         size within normal limits         - mediastinum: Fullness in the right paratracheal and right  suprahilar regions, suspicious for the presence of a mass lesion.          - lungs:         no focal air space process, pulmonary  interstitial edema, nodule(s)/mass             - pleura:         no evidence of  fluid                  - osseous:         unremarkable for age                  - misc.:         Impression:       CONCLUSION:        1. Mass lesion suspected present in the right suprahilar region.  Correlation with contrast-enhanced CT recommended.                                                              Electronically signed by:  TRE Giraldo MD  11/20/2018 11:44  AM CST Workstation: [id number]          Assessment/Plan        Closed displaced intertrochanteric fracture of left femur (CMS/HCC)    Hypertension    Emphysema of lung (CMS/HCC)    Atherosclerosis    Symptomatic pain relief, supportive care, Oxygen, nebulized treatments, consult Orthopedic Surgery.    Kyrie Mcdonald MD  11/20/18  3:24 PM

## 2018-11-20 NOTE — PLAN OF CARE
Problem: Fall Risk (Adult)  Goal: Identify Related Risk Factors and Signs and Symptoms  Outcome: Outcome(s) achieved Date Met: 11/20/18 11/20/18 1459   Fall Risk (Adult)   Related Risk Factors (Fall Risk) history of falls   Signs and Symptoms (Fall Risk) presence of risk factors     Goal: Absence of Fall  Outcome: Ongoing (interventions implemented as appropriate)      Problem: Fracture Orthopaedic (Adult)  Goal: Signs and Symptoms of Listed Potential Problems Will be Absent, Minimized or Managed (Fracture Orthopaedic)  Outcome: Ongoing (interventions implemented as appropriate)

## 2018-11-20 NOTE — CONSULTS
Orthopaedic surg  Chief Complaint   Patient presents with   • Syncope   • Fall     Hip Pain: Patient complains of left hip pain. Onset of the symptoms was 12 hours ago. Inciting event: twisted while /. Current symptoms include is worse with weight bearing. Associated symptoms: injury, knee giving out, crepitus sensation. Aggravating symptoms: lateral movements, rising after sitting, standing and walking. Patient's course of pain: began worsening 12 hours ago. Patient has had prior hip problems. Previous visits for this problem: none. Evaluation to date: plain films, which were fracture.  Treatment to date: prescription analgesics, which have been effective.  Fell down  Review neg fever/ chills, nightsweats, eye/ear neg, cardiac/ pulm neg, gi/ gu neg, derm neg, no active bruising or bleeding, no swelling, c/o pain left hip, neg numbness or weakness  Neg psych    Past Medical History:   Diagnosis Date   • Abdominal aortic aneurysm (CMS/[facility])    • Carotid stenosis, bilateral    • Cataract    • Emphysema of lung (CMS/[facility])    • Herpes zoster    • Hyperlipidemia    • Hypertension      No current facility-administered medications on file prior to encounter.      Current Outpatient Medications on File Prior to Encounter   Medication Sig Dispense Refill   • aspirin 81 MG chewable tablet Chew 81 mg Daily.     • latanoprost (XALATAN) 0.005 % ophthalmic solution Administer 1 drop into the left eye Every Night.     • lisinopril (PRINIVIL,ZESTRIL) 20 MG tablet Take 1 tablet by mouth Daily. 90 tablet 3   • Multiple Vitamins-Minerals (VISION-HECTOR PRESERVE PO) Take 1 capsule by mouth 2 (Two) Times a Day. PreserVision Lutein 226 mg-200 unit-5 mg-0.8 mg capsule      • simvastatin (ZOCOR) 20 MG tablet Take 1 tablet by mouth Every Night. Bedtime 90 tablet 3   • varenicline (CHANTIX CONTINUING MONTH PAK) 1 MG tablet Take 1 tablet by mouth 2 (Two) Times a Day. 60 tablet 3   • varenicline (CHANTIX STARTING MONTH PAK) 0.5 MG X 11 & 1 MG X 42  tablet Take 0.5 mg one daily on days 1-3 and and 0.5 mg twice daily on days 4-7.Then 1 mg twice daily for a total of 12 weeks. 53 tablet 0     Past Surgical History:   Procedure Laterality Date   • CARDIAC CATHETERIZATION     • COLONOSCOPY W/ POLYPECTOMY     • HAND SURGERY     No Known Allergies   Social History     Socioeconomic History   • Marital status:      Spouse name: Not on file   • Number of children: Not on file   • Years of education: Not on file   • Highest education level: Not on file   Social Needs   • Financial resource strain: Not on file   • Food insecurity - worry: Not on file   • Food insecurity - inability: Not on file   • Transportation needs - medical: Not on file   • Transportation needs - non-medical: Not on file   Occupational History   • Not on file   Tobacco Use   • Smoking status: Current Every Day Smoker     Packs/day: 1.00     Years: 60.00     Pack years: 60.00     Types: Cigarettes   • Smokeless tobacco: Former User   Substance and Sexual Activity   • Alcohol use: No   • Drug use: No   • Sexual activity: Not Currently   Other Topics Concern   • Not on file   Social History Narrative   • Not on file     Family History   Problem Relation Age of Onset   • Breast cancer Mother    • Hypertension Father      Vitals:    11/20/18 1724   BP: 123/58   Pulse: 79   Resp: 18   Temp: 98.7 °F (37.1 °C)   SpO2: 97%     Elderly, alert and oriented  Perrla, no bruising or swelling  No jugular venous distention, neck alignment normal  Breathing comfortably  No wheezes  Abdomen soft, no masses  Pulse present in the extremities  Brisk capillary refill  No joint swelling, no rash  Skin warm and dry  No bruising,   No hyperreflexia, no clonus, CN II-Xii intact      Lab Results (last 24 hours)     Procedure Component Value Units Date/Time    Urinalysis, Microscopic Only - Urine, Catheter [[id number]]  (Abnormal) Collected:  11/20/18 1320    Specimen:  Urine, Catheter Updated:  11/20/18 1407     RBC, UA  3-5 /HPF      WBC, UA 21-30 /HPF      Bacteria, UA None Seen /HPF      Squamous Epithelial Cells, UA 6-12 /HPF      Hyaline Casts, UA 21-30 /LPF      Methodology Manual Light Microscopy    Urine Culture - Urine, Urine, Catheter [[id number]] Collected:  11/20/18 1320    Specimen:  Urine, Catheter Updated:  11/20/18 1407    Urinalysis With Culture If Indicated - Urine, Catheter [[id number]]  (Abnormal) Collected:  11/20/18 1320    Specimen:  Urine, Catheter Updated:  11/20/18 1342     Color, UA Yellow     Appearance, UA Cloudy     pH, UA 5.5     Specific Gravity, UA 1.020     Glucose, UA Negative     Ketones, UA Negative     Bilirubin, UA Negative     Blood, UA Trace     Protein,  mg/dL (2+)     Leuk Esterase, UA Negative     Nitrite, UA Negative     Urobilinogen, UA 0.2 E.U./dL    Protime-INR [[id number]]  (Normal) Collected:  11/20/18 1048    Specimen:  Blood Updated:  11/20/18 1304     Protime 14.0 Seconds      INR 1.10    Narrative:       Therapeutic range for most indications is 2.0-3.0 INR,  or 2.5-3.5 for mechanical heart valves.    aPTT [[id number]]  (Normal) Collected:  11/20/18 1048    Specimen:  Blood Updated:  11/20/18 1304     PTT 33.3 seconds     Narrative:       The recommended Heparin therapeutic range is 68-97 seconds.    [place] Draw [[id number]] Collected:  11/20/18 1048    Specimen:  Blood Updated:  11/20/18 1200    Narrative:       The following orders were created for panel order [place] Draw.  Procedure                               Abnormality         Status                     ---------                               -----------         ------                     Light Blue Top[[id number]]                                   Final result               Green Top (Gel)[[id number]]                                  Final result               Lavender Top[[id number]]                                     Final result               Gold Top - SST[[id number]]                                   Final result                  Please view results for these tests on the individual orders.    Light Blue Top [[id number]] Collected:  11/20/18 1048    Specimen:  Blood Updated:  11/20/18 1200     Extra Tube hold for add-on     Comment: Auto resulted       Green Top (Gel) [[id number]] Collected:  11/20/18 1048    Specimen:  Blood Updated:  11/20/18 1200     Extra Tube Hold for add-ons.     Comment: Auto resulted.       Lavender Top [[id number]] Collected:  11/20/18 1048    Specimen:  Blood Updated:  11/20/18 1200     Extra Tube hold for add-on     Comment: Auto resulted       Gold Top - SST [[id number]] Collected:  11/20/18 1048    Specimen:  Blood Updated:  11/20/18 1200     Extra Tube Hold for add-ons.     Comment: Auto resulted.       Troponin [[id number]]  (Normal) Collected:  11/20/18 1048    Specimen:  Blood Updated:  11/20/18 1126     Troponin I <0.012 ng/mL     Comprehensive Metabolic Panel [[id number]]  (Abnormal) Collected:  11/20/18 1048    Specimen:  Blood Updated:  11/20/18 1115     Glucose 132 mg/dL      BUN 20 mg/dL      Creatinine 1.20 mg/dL      Sodium 136 mmol/L      Potassium 3.8 mmol/L      Chloride 102 mmol/L      CO2 26.0 mmol/L      Calcium 8.7 mg/dL      Total Protein 6.5 g/dL      Albumin 3.70 g/dL      ALT (SGPT) 18 U/L      AST (SGOT) 19 U/L      Alkaline Phosphatase 68 U/L      Total Bilirubin 1.1 mg/dL      eGFR Non African Amer 58 mL/min/1.73      Globulin 2.8 gm/dL      A/G Ratio 1.3 g/dL      BUN/Creatinine Ratio 16.7     Anion Gap 8.0 mmol/L     Narrative:       The MDRD GFR formula is only valid for adults with stable renal function between ages 18 and 70.    Magnesium [[id number]]  (Normal) Collected:  11/20/18 1048    Specimen:  Blood Updated:  11/20/18 1115     Magnesium 2.0 mg/dL     CBC & Differential [[id number]] Collected:  11/20/18 1048    Specimen:  Blood Updated:  11/20/18 1058    Narrative:       The following orders were created for panel order CBC & Differential.  Procedure                                Abnormality         Status                     ---------                               -----------         ------                     CBC Auto Differential[[id number]]        Abnormal            Final result                 Please view results for these tests on the individual orders.    CBC Auto Differential [[id number]]  (Abnormal) Collected:  11/20/18 1048    Specimen:  Blood Updated:  11/20/18 1058     WBC 17.16 10*3/mm3      RBC 4.06 10*6/mm3      Hemoglobin 12.7 g/dL      Hematocrit 36.6 %      MCV 90.1 fL      MCH 31.3 pg      MCHC 34.7 g/dL      RDW 12.5 %      RDW-SD 41.5 fl      MPV 8.6 fL      Platelets 227 10*3/mm3      Neutrophil % 83.2 %      Lymphocyte % 12.0 %      Monocyte % 3.8 %      Eosinophil % 0.5 %      Basophil % 0.2 %      Immature Grans % 0.3 %      Neutrophils, Absolute 14.29 10*3/mm3      Lymphocytes, Absolute 2.06 10*3/mm3      Monocytes, Absolute 0.65 10*3/mm3      Eosinophils, Absolute 0.08 10*3/mm3      Basophils, Absolute 0.03 10*3/mm3      Immature Grans, Absolute 0.05 10*3/mm3         Imaging Results (last 24 hours)     Procedure Component Value Units Date/Time    XR Femur 2 View Left [[id number]] Collected:  11/20/18 1257     Updated:  11/20/18 1326    Narrative:         EXAM:  Radiograph(s), Osseous         REGION:   Femur    SIDE:     Left     VIEWS:   2             INDICATION:    femur, S72.142A Displaced intertrochanteric  fracture of left femur, initial encounter for closed fracture  R91.8 Other nonspecific abnormal finding of lung field S72.22XA  Displaced subtrochanteric fracture of left femur, initial  encounter for closed fracture R55 Syncope and collapse     COMPARISON:    none              FINDINGS:           Minimally displaced intertrochanteric fracture.  .        Impression:       CONCLUSION:           1. Minimally displaced intertrochanteric fracture.                             Electronically signed by:  TRE Giraldo MD  11/20/2018 1:25  PM CST  Workstation: [id number]    CT Angiogram Aorta [[id number]] Collected:  11/20/18 1158     Updated:  11/20/18 1258    Narrative:       Procedure: CT angiogram abdominal aorta with contrast    Reason for exam: Syncope, suspect AAA.    FINDINGS: Axial computer tomography sequential imaging was  performed from the diaphragms through the symphysis pubis after  dynamic bolus contrast injection. Sagittal and coronal  reformation was performed. This exam was performed according to  our departmental dose optimization program, which includes  automated exposure control, adjustment of the mA and/or KV  according to patient size and/or use of iterative reconstruction  technique. 3-D vascular reconstruction of the abdominal aorta was  performed.    CTA findings: Proximal abdominal aortic ectasia measuring 2.5 x  2.4 cm in AP and transverse dimension. No evidence of abdominal  aortic aneurysm. No evidence of aortic dissection. Abdominal  aorta soft and calcified atherosclerotic plaque are seen none of  which appear hemodynamically significant. The celiac trunk is  normal. Proximal superior mesenteric artery partially calcified  atherosclerotic plaque which is causing 5-10% diameter stenosis.  The superior mesenteric artery is otherwise normal. The inferior  mesenteric artery is normal. The right renal artery is normal.  The left renal artery is normal. The right common iliac artery  has small foci of calcified atherosclerotic plaque none of which  appear hemodynamically significant. The right internal iliac  artery has proximal small calcified atherosclerotic plaque not  hemodynamically significant. The right external iliac artery is  normal. Proximal right common femoral artery partially calcified  atherosclerotic plaque which is causing 30% diameter stenosis.  Otherwise the right common femoral artery is unremarkable.  Proximal right deep profunda artery and right superficial femoral  artery are normal. The left common iliac artery  distal soft  atherosclerotic plaque which is causing 40-50% diameter stenosis.  The left internal iliac artery has a few proximal calcified  atherosclerotic plaque which are not hemodynamically significant.  The left external iliac artery appears normal. The left common  femoral artery has soft atherosclerotic plaque which is causing  40-50% diameter stenosis. Proximal left deep profunda artery soft  and calcified atherosclerotic plaque which is causing significant  stenosis of 80% or greater. Proximal left superficial femoral  artery appears normal.    The lung bases are unremarkable. Multiple left lobe of liver and  single posterior segment right lobe of liver small benign simple  hepatic cyst. Otherwise the liver is unremarkable. The  gallbladder is normal. The biliary system is normal. The pancreas  is normal. The spleen is normal. Bilateral adrenal glands are  normal. Right kidney mid zone small subcentimeter simple renal  cortical cyst. Right kidney lower pole circular hypodense lesion  which has Hounsfield units 28. This most likely represents benign  hyperdense cyst. However, recommend follow-up CT in 3-6  months  to document stability. Otherwise right kidney and ureter are  normal. Left kidney upper pole simple renal cortical cysts which  measures 2.9 cm in greatest diameter. Right kidney mid zone small  subcentimeter simple renal cortical cyst. Otherwise left kidney  and ureter are normal. The bladder is normal. The prostate gland  appears enlarged and is impinging upon the base of bladder.  Multiple sigmoid colon diverticula. Multiple descending colon  diverticula. Otherwise hollow viscera is unremarkable. No  lymphadenopathy in the abdomen or the pelvis. Left femur  comminuted intertrochanteric fracture. Otherwise no acute osseous  abnormality.      Impression:       1.  CTA findings as described above.  2.  Multiple liver benign hepatic cyst.  3.  Right kidney lower pole circular hypodense lesion which  has  Hounsfield units of 28. This most likely represents benign  hyperdense cyst. However, recommend follow-up CT in 3-6 months to  document stability.  4.  Additional bilateral kidney simple renal cortical cyst.  5.  Mildly enlarged prostate gland may represent changes from  benign prostatic hypertrophy versus prostate malignancy.  Recommend clinical correlation.  6.  Left femur comminuted intertrochanteric fracture.  7.  Colonic diverticulosis.    Electronically signed by:  Tulio Melendrez MD  11/20/2018 12:51 PM CST  Workstation: [id number]    CT Head Without Contrast [[id number]] Collected:  11/20/18 1113     Updated:  11/20/18 1206    Narrative:         CT Head Without Contrast    History: Syncope    Axial scans of the brain were obtained without intravenous  contrast.  Coronal and sagital reconstructions were preformed.    This exam was performed according to our departmental  dose-optimization program, which includes automated exposure  control, adjustment of the mA and/or kV according to patient size  and/or use of iterative reconstruction technique.    DLP: 908.20    Comparison: April 24, 2017    Bone windows are unremarkable.  The visualized paranasal sinuses are unremarkable.    No acute process.  Cerebral atrophy.  Minimal small vessel disease.  No hemorrhage.  No mass.  No abnormal areas of increased attenuation.  No midline shift.  No abnormal extra-axial fluid collections.      Impression:       CONCLUSION:  No acute process.  Cerebral atrophy.  Minimal small vessel disease.    [id number]    Electronically signed by:  Jose Handley MD  11/20/2018 11:54 AM  CST Workstation: [id number]    XR Hip With or Without Pelvis 2 - 3 View Left [[id number]] Collected:  11/20/18 1052     Updated:  11/20/18 1206    Narrative:       Procedure: XR HIP 2 OR MORE VIEWS UNILATERAL WITH PELVIS.    History: fall, left hip pain, inability to bear weight.    Comparison: None    Findings:  Frontal view of the pelvis and two views of the left  hip were  obtained, demonstrating a displaced, possibly comminuted,  medially angulated intertrochanteric fracture of the left  proximal femur. No other fractures are seen. There are  degenerative changes of the lower lumbar spine. The bones appear  demineralized.      Impression:       Impression: Displaced, possibly comminuted, medially angulated  intertrochanteric fracture of the left proximal femur.     Electronically signed by:  Tone Olivas MD  11/20/2018 11:47 AM  CST Workstation: [id number]    XR Chest 1 View [[id number]] Collected:  11/20/18 1102     Updated:  11/20/18 1145    Narrative:         EXAM:         Radiograph(s), Chest   VIEWS:   Frontal  ; 1       DATE/TIME:  11/20/2018 11:43 AM CST                INDICATION:   pre op, hip fracture    COMPARISON:  CXR: none             FINDINGS:             - lines/tubes:    none     - cardiac:         size within normal limits         - mediastinum: Fullness in the right paratracheal and right  suprahilar regions, suspicious for the presence of a mass lesion.          - lungs:         no focal air space process, pulmonary  interstitial edema, nodule(s)/mass             - pleura:         no evidence of  fluid                  - osseous:         unremarkable for age                  - misc.:         Impression:       CONCLUSION:        1. Mass lesion suspected present in the right suprahilar region.  Correlation with contrast-enhanced CT recommended.                                                              Electronically signed by:  TRE Giraldo MD  11/20/2018 11:44  AM CST Workstation: [id number]          Xray left hip transtrochanteric femur fracture, displaced     Diagnosis Plan   1. Closed displaced intertrochanteric fracture of left femur, initial encounter (CMS/[facility])     2. Mass of right lung     3. Closed displaced subtrochanteric fracture of left femur, initial encounter (CMS/[facility])  Case Request    Case Request   4. Syncope, unspecified syncope type        Recommend surgery for fixation of /L femur fracture, no good option for non operative treatment.  Surgery will require mechanical fixation, described to him.  Risks of surgery include deep or superficial infection hematoma, wound healing complications malpositioning of instrumentation, loosening, non union, malunion, persistent pain, and additional surgery/ revision surgery and blood transfusion.  Nerve or vascular injury is possible.  Medical risks of anesthesia include death, heart attack heart arrhythmia, heart failure stroke gastric ulceration perforation peritonitis deep vein thrombosis pulmonary embolus pneumonia pulmonary failure necessitating prolonged ventilation renal failure sepsis and multiorgan system failure.  All risks have been reviewed extensively.  There are no guarantees.  Surgery is posted for scheduling, canceled by anesthesia service due to risk for stroke.

## 2018-11-20 NOTE — ED PROVIDER NOTES
Subjective   Patient presents to emergency department for passing out when getting up from his chair this morning at around 830am.  Wife states he was unconscious for about a minute or two.  Was unable to get up due to left leg pain.            History provided by:  Patient   used: No    Syncope   Episode history:  Single  Most recent episode:  Today  Duration:  1 minute  Timing:  Constant  Progression:  Resolved  Chronicity:  New  Context: standing up    Witnessed: yes    Relieved by:  Lying down  Worsened by:  Nothing  Associated symptoms: recent fall    Associated symptoms: no anxiety, no chest pain, no confusion, no diaphoresis, no difficulty breathing, no dizziness, no fever, no focal sensory loss, no focal weakness, no headaches, no malaise/fatigue, no nausea, no palpitations, no recent injury, no recent surgery, no rectal bleeding, no seizures, no shortness of breath, no visual change, no vomiting and no weakness        Review of Systems   Constitutional: Negative for diaphoresis, fever and malaise/fatigue.   HENT: Negative for sore throat and trouble swallowing.    Eyes: Negative for visual disturbance.   Respiratory: Negative for cough, chest tightness, shortness of breath and wheezing.    Cardiovascular: Positive for syncope. Negative for chest pain, palpitations and leg swelling.   Gastrointestinal: Negative for abdominal pain, nausea and vomiting.   Genitourinary: Negative for dysuria and flank pain.   Musculoskeletal: Positive for arthralgias. Negative for back pain.   Skin: Negative for color change.   Allergic/Immunologic: Negative for immunocompromised state.   Neurological: Negative for dizziness, focal weakness, seizures, weakness and headaches.   Hematological: Does not bruise/bleed easily.   Psychiatric/Behavioral: Negative for confusion.       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    • Encounter for immunization    • Encounter for screening for malignant neoplasm of colon    • Essential hypertension    • H/O emphysema     still smoking   • Herpes zoster    • History of adenomatous polyp of colon     No adenomatous polyps on cscope    • History of colon polyps    • Hyperlipidemia    • Knee pain    • Leg pain     left   • Low back pain    • Nonexudative age-related macular degeneration    • Nuclear cataract    • Optic atrophy      OS, stable   • 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 Both coronary arteries are essentially normal   • COLONOSCOPY  10/23/2012     Diverticulosis in sigmoid colon. Hemorrhoids in anus. 2 polyps in distal sigmoid; second polyps removed by hot biopsy polypectomy. First polyp removed by snare cautery polypectomy Normal cecum   • COLONOSCOPY W/ POLYPECTOMY  10/27/2015    External skin tags found during the rectal exam.Two smooth polyps in the ascending colon;polyps removed by hot biopsy polypectomy.Single polyp in rectosigmoid junction;removed by hot biopsy polypectomy   • EYE SURGERY  12/28/2015    OCT DISC NFL   • HAND SURGERY  12/10/2002     Saw partial dorsal amputation, with destruction of nail bed. Repair of nailbed   • PROCEDURE GENERIC CONVERTED  01/04/2014    Remove Impacted Cerumen   • SKIN BIOPSY  04/20/2000      3.8 cm inclusion cyst, occiput of scalp       Family History   Problem Relation Age of Onset   • Breast cancer Mother    • Heart disease Other    • Hypertension Other        Social History     Socioeconomic History   • Marital status:      Spouse name: Not on file   • Number of children: Not on file   • Years of education: Not on file   • Highest education level: Not on  "file   Tobacco Use   • Smoking status: Current Every Day Smoker     Packs/day: 1.00     Years: 60.00     Pack years: 60.00     Types: Cigarettes   • Smokeless tobacco: Former User   Substance and Sexual Activity   • Alcohol use: No   • Drug use: No   • Sexual activity: Defer           Objective      /58   Pulse 84   Temp 96.2 °F (35.7 °C) (Axillary)   Resp 18   Ht 175.3 cm (69\")   Wt 79.5 kg (175 lb 4.8 oz)   SpO2 99%   BMI 25.89 kg/m²     Physical Exam   Constitutional: He is oriented to person, place, and time. He appears well-developed and well-nourished. No distress.   HENT:   Head: Normocephalic and atraumatic.   Eyes: Conjunctivae are normal.   Cardiovascular: Normal rate, regular rhythm and normal heart sounds.   Pulmonary/Chest: Effort normal and breath sounds normal.   Abdominal: Soft. Bowel sounds are normal.   Musculoskeletal: He exhibits tenderness and deformity.   Left leg shortened and externally rotated   Neurological: He is alert and oriented to person, place, and time.   Skin: Skin is warm and dry.   Psychiatric: He has a normal mood and affect. His behavior is normal. Thought content normal.   Nursing note and vitals reviewed.      ECG 12 Lead    Date/Time: 11/20/2018 10:16 AM  Performed by: Thomas Jones PA-C  Authorized by: Prabhu Jones MD   Interpreted by physician  Comparison: compared with previous ECG from 12/30/2016  Similar to previous ECG  Rhythm: sinus rhythm  Rate: normal  BPM: 75  Conduction: complete LBBB and 1st degree                 ED Course  ED Course as of Nov 20 1313   Tue Nov 20, 2018   1241 Spoke with Dr Hare.    [JW]   1312 Patient admitted to hospitalist.    [JW]      ED Course User Index  [JW] Thomas Jones PA-C      Results for orders placed or performed during the hospital encounter of 11/20/18   Comprehensive Metabolic Panel   Result Value Ref Range    Glucose 132 (H) 60 - 100 mg/dL    BUN 20 7 - 21 mg/dL    Creatinine 1.20 0.70 - " 1.30 mg/dL    Sodium 136 (L) 137 - 145 mmol/L    Potassium 3.8 3.5 - 5.1 mmol/L    Chloride 102 95 - 110 mmol/L    CO2 26.0 22.0 - 31.0 mmol/L    Calcium 8.7 8.4 - 10.2 mg/dL    Total Protein 6.5 6.3 - 8.6 g/dL    Albumin 3.70 3.40 - 4.80 g/dL    ALT (SGPT) 18 (L) 21 - 72 U/L    AST (SGOT) 19 17 - 59 U/L    Alkaline Phosphatase 68 38 - 126 U/L    Total Bilirubin 1.1 0.2 - 1.3 mg/dL    eGFR Non African Amer 58 42 - 98 mL/min/1.73    Globulin 2.8 2.3 - 3.5 gm/dL    A/G Ratio 1.3 1.1 - 1.8 g/dL    BUN/Creatinine Ratio 16.7 7.0 - 25.0    Anion Gap 8.0 5.0 - 15.0 mmol/L   Magnesium   Result Value Ref Range    Magnesium 2.0 1.6 - 2.3 mg/dL   Troponin   Result Value Ref Range    Troponin I <0.012 <=0.034 ng/mL   CBC Auto Differential   Result Value Ref Range    WBC 17.16 (H) 3.20 - 9.80 10*3/mm3    RBC 4.06 (L) 4.37 - 5.74 10*6/mm3    Hemoglobin 12.7 (L) 13.7 - 17.3 g/dL    Hematocrit 36.6 (L) 39.0 - 49.0 %    MCV 90.1 80.0 - 98.0 fL    MCH 31.3 26.5 - 34.0 pg    MCHC 34.7 31.5 - 36.3 g/dL    RDW 12.5 11.5 - 14.5 %    RDW-SD 41.5 35.1 - 43.9 fl    MPV 8.6 8.0 - 12.0 fL    Platelets 227 150 - 450 10*3/mm3    Neutrophil % 83.2 (H) 37.0 - 80.0 %    Lymphocyte % 12.0 10.0 - 50.0 %    Monocyte % 3.8 0.0 - 12.0 %    Eosinophil % 0.5 0.0 - 7.0 %    Basophil % 0.2 0.0 - 2.0 %    Immature Grans % 0.3 0.0 - 0.5 %    Neutrophils, Absolute 14.29 (H) 2.00 - 8.60 10*3/mm3    Lymphocytes, Absolute 2.06 0.60 - 4.20 10*3/mm3    Monocytes, Absolute 0.65 0.00 - 0.90 10*3/mm3    Eosinophils, Absolute 0.08 0.00 - 0.70 10*3/mm3    Basophils, Absolute 0.03 0.00 - 0.20 10*3/mm3    Immature Grans, Absolute 0.05 (H) 0.00 - 0.02 10*3/mm3   Protime-INR   Result Value Ref Range    Protime 14.0 11.1 - 15.3 Seconds    INR 1.10 0.80 - 1.20   aPTT   Result Value Ref Range    PTT 33.3 20.0 - 40.3 seconds   Light Blue Top   Result Value Ref Range    Extra Tube hold for add-on    Green Top (Gel)   Result Value Ref Range    Extra Tube Hold for add-ons.     Lavender Top   Result Value Ref Range    Extra Tube hold for add-on    Gold Top - SST   Result Value Ref Range    Extra Tube Hold for add-ons.      Ct Head Without Contrast    Result Date: 11/20/2018  Narrative: CT Head Without Contrast History: Syncope Axial scans of the brain were obtained without intravenous contrast.  Coronal and sagital reconstructions were preformed. This exam was performed according to our departmental dose-optimization program, which includes automated exposure control, adjustment of the mA and/or kV according to patient size and/or use of iterative reconstruction technique. DLP: 908.20 Comparison: April 24, 2017 Bone windows are unremarkable. The visualized paranasal sinuses are unremarkable. No acute process. Cerebral atrophy. Minimal small vessel disease. No hemorrhage. No mass. No abnormal areas of increased attenuation. No midline shift. No abnormal extra-axial fluid collections.     Impression: CONCLUSION: No acute process. Cerebral atrophy. Minimal small vessel disease. [id number] Electronically signed by:  Jose Handley MD  11/20/2018 11:54 AM CST Workstation: [employer]    Xr Chest 1 View    Result Date: 11/20/2018  Narrative: EXAM:         Radiograph(s), Chest VIEWS:   Frontal  ; 1     DATE/TIME:  11/20/2018 11:43 AM CST            INDICATION:   pre op, hip fracture  COMPARISON:  CXR: none         FINDINGS:         - lines/tubes:    none   - cardiac:         size within normal limits       - mediastinum: Fullness in the right paratracheal and right suprahilar regions, suspicious for the presence of a mass lesion.       - lungs:         no focal air space process, pulmonary interstitial edema, nodule(s)/mass           - pleura:         no evidence of  fluid                - osseous:         unremarkable for age                - misc.:        Impression: CONCLUSION:    1. Mass lesion suspected present in the right suprahilar region. Correlation with contrast-enhanced CT recommended.                                                   Electronically signed by:  TRE Giraldo MD  11/20/2018 11:44 AM CST Workstation: [id number]    Ct Angiogram Aorta    Result Date: 11/20/2018  Narrative: Procedure: CT angiogram abdominal aorta with contrast Reason for exam: Syncope, suspect AAA. FINDINGS: Axial computer tomography sequential imaging was performed from the diaphragms through the symphysis pubis after dynamic bolus contrast injection. Sagittal and coronal reformation was performed. This exam was performed according to our departmental dose optimization program, which includes automated exposure control, adjustment of the mA and/or KV according to patient size and/or use of iterative reconstruction technique. 3-D vascular reconstruction of the abdominal aorta was performed. CTA findings: Proximal abdominal aortic ectasia measuring 2.5 x 2.4 cm in AP and transverse dimension. No evidence of abdominal aortic aneurysm. No evidence of aortic dissection. Abdominal aorta soft and calcified atherosclerotic plaque are seen none of which appear hemodynamically significant. The celiac trunk is normal. Proximal superior mesenteric artery partially calcified atherosclerotic plaque which is causing 5-10% diameter stenosis. The superior mesenteric artery is otherwise normal. The inferior mesenteric artery is normal. The right renal artery is normal. The left renal artery is normal. The right common iliac artery has small foci of calcified atherosclerotic plaque none of which appear hemodynamically significant. The right internal iliac artery has proximal small calcified atherosclerotic plaque not hemodynamically significant. The right external iliac artery is normal. Proximal right common femoral artery partially calcified atherosclerotic plaque which is causing 30% diameter stenosis. Otherwise the right common femoral artery is unremarkable. Proximal right deep profunda artery and right superficial femoral artery are  normal. The left common iliac artery distal soft atherosclerotic plaque which is causing 40-50% diameter stenosis. The left internal iliac artery has a few proximal calcified atherosclerotic plaque which are not hemodynamically significant. The left external iliac artery appears normal. The left common femoral artery has soft atherosclerotic plaque which is causing 40-50% diameter stenosis. Proximal left deep profunda artery soft and calcified atherosclerotic plaque which is causing significant stenosis of 80% or greater. Proximal left superficial femoral artery appears normal. The lung bases are unremarkable. Multiple left lobe of liver and single posterior segment right lobe of liver small benign simple hepatic cyst. Otherwise the liver is unremarkable. The gallbladder is normal. The biliary system is normal. The pancreas is normal. The spleen is normal. Bilateral adrenal glands are normal. Right kidney mid zone small subcentimeter simple renal cortical cyst. Right kidney lower pole circular hypodense lesion which has Hounsfield units 28. This most likely represents benign hyperdense cyst. However, recommend follow-up CT in 3-6  months to document stability. Otherwise right kidney and ureter are normal. Left kidney upper pole simple renal cortical cysts which measures 2.9 cm in greatest diameter. Right kidney mid zone small subcentimeter simple renal cortical cyst. Otherwise left kidney and ureter are normal. The bladder is normal. The prostate gland appears enlarged and is impinging upon the base of bladder. Multiple sigmoid colon diverticula. Multiple descending colon diverticula. Otherwise hollow viscera is unremarkable. No lymphadenopathy in the abdomen or the pelvis. Left femur comminuted intertrochanteric fracture. Otherwise no acute osseous abnormality.     Impression: 1.  CTA findings as described above. 2.  Multiple liver benign hepatic cyst. 3.  Right kidney lower pole circular hypodense lesion which has  Hounsfield units of 28. This most likely represents benign hyperdense cyst. However, recommend follow-up CT in 3-6 months to document stability. 4.  Additional bilateral kidney simple renal cortical cyst. 5.  Mildly enlarged prostate gland may represent changes from benign prostatic hypertrophy versus prostate malignancy. Recommend clinical correlation. 6.  Left femur comminuted intertrochanteric fracture. 7.  Colonic diverticulosis. Electronically signed by:  Tulio Melendrez MD  11/20/2018 12:51 PM CST Workstation: [id number]    Xr Hip With Or Without Pelvis 2 - 3 View Left    Result Date: 11/20/2018  Narrative: Procedure: XR HIP 2 OR MORE VIEWS UNILATERAL WITH PELVIS. History: fall, left hip pain, inability to bear weight. Comparison: None Findings: Frontal view of the pelvis and two views of the left hip were obtained, demonstrating a displaced, possibly comminuted, medially angulated intertrochanteric fracture of the left proximal femur. No other fractures are seen. There are degenerative changes of the lower lumbar spine. The bones appear demineralized.     Impression: Impression: Displaced, possibly comminuted, medially angulated intertrochanteric fracture of the left proximal femur. Electronically signed by:  Tone Olivas MD  11/20/2018 11:47 AM CST Workstation: [id number]                [facility]      Final diagnoses:   Closed displaced intertrochanteric fracture of left femur, initial encounter (CMS/[facility])   Mass of right lung   Syncope, unspecified syncope type            Thomas Jones PA-C  11/20/18 1319

## 2018-11-21 ENCOUNTER — ANESTHESIA EVENT (OUTPATIENT)
Dept: PERIOP | Facility: HOSPITAL | Age: 83
End: 2018-11-21

## 2018-11-21 ENCOUNTER — APPOINTMENT (OUTPATIENT)
Dept: GENERAL RADIOLOGY | Facility: HOSPITAL | Age: 83
End: 2018-11-21

## 2018-11-21 ENCOUNTER — ANESTHESIA (OUTPATIENT)
Dept: PERIOP | Facility: HOSPITAL | Age: 83
End: 2018-11-21

## 2018-11-21 LAB
ANION GAP SERPL CALCULATED.3IONS-SCNC: 5 MMOL/L (ref 5–15)
BUN BLD-MCNC: 20 MG/DL (ref 7–21)
BUN/CREAT SERPL: 21.3 (ref 7–25)
CALCIUM SPEC-SCNC: 8.2 MG/DL (ref 8.4–10.2)
CHLORIDE SERPL-SCNC: 105 MMOL/L (ref 95–110)
CO2 SERPL-SCNC: 25 MMOL/L (ref 22–31)
CREAT BLD-MCNC: 0.94 MG/DL (ref 0.7–1.3)
DEPRECATED RDW RBC AUTO: 39.6 FL (ref 35.1–43.9)
EOSINOPHIL # BLD MANUAL: 0.08 10*3/MM3 (ref 0–0.7)
EOSINOPHIL NFR BLD MANUAL: 1 % (ref 0–7)
ERYTHROCYTE [DISTWIDTH] IN BLOOD BY AUTOMATED COUNT: 12.3 % (ref 11.5–14.5)
GFR SERPL CREATININE-BSD FRML MDRD: 77 ML/MIN/1.73 (ref 42–98)
GLUCOSE BLD-MCNC: 94 MG/DL (ref 60–100)
HCT VFR BLD AUTO: 31.8 % (ref 39–49)
HGB BLD-MCNC: 11.1 G/DL (ref 13.7–17.3)
LYMPHOCYTES # BLD MANUAL: 2.26 10*3/MM3 (ref 0.6–4.2)
LYMPHOCYTES NFR BLD MANUAL: 29 % (ref 10–50)
LYMPHOCYTES NFR BLD MANUAL: 6 % (ref 0–12)
MCH RBC QN AUTO: 30.8 PG (ref 26.5–34)
MCHC RBC AUTO-ENTMCNC: 34.9 G/DL (ref 31.5–36.3)
MCV RBC AUTO: 88.3 FL (ref 80–98)
MONOCYTES # BLD AUTO: 0.47 10*3/MM3 (ref 0–0.9)
NEUTROPHILS # BLD AUTO: 4.76 10*3/MM3 (ref 2–8.6)
NEUTROPHILS NFR BLD MANUAL: 60 % (ref 37–80)
NEUTS BAND NFR BLD MANUAL: 1 % (ref 0–5)
PLATELET # BLD AUTO: 187 10*3/MM3 (ref 150–450)
PMV BLD AUTO: 9.2 FL (ref 8–12)
POTASSIUM BLD-SCNC: 4.4 MMOL/L (ref 3.5–5.1)
RBC # BLD AUTO: 3.6 10*6/MM3 (ref 4.37–5.74)
RBC MORPH BLD: NORMAL
SMALL PLATELETS BLD QL SMEAR: ADEQUATE
SODIUM BLD-SCNC: 135 MMOL/L (ref 137–145)
VARIANT LYMPHS NFR BLD MANUAL: 3 % (ref 0–5)
WBC MORPH BLD: NORMAL
WBC NRBC COR # BLD: 7.81 10*3/MM3 (ref 3.2–9.8)

## 2018-11-21 PROCEDURE — 25010000002 HYDROMORPHONE 1 MG/ML SOLUTION: Performed by: NURSE ANESTHETIST, CERTIFIED REGISTERED

## 2018-11-21 PROCEDURE — 27245 TREAT THIGH FRACTURE: CPT | Performed by: ORTHOPAEDIC SURGERY

## 2018-11-21 PROCEDURE — 85027 COMPLETE CBC AUTOMATED: CPT | Performed by: INTERNAL MEDICINE

## 2018-11-21 PROCEDURE — 25810000003 SODIUM CHLORIDE 0.9 % WITH KCL 20 MEQ 20-0.9 MEQ/L-% SOLUTION: Performed by: ORTHOPAEDIC SURGERY

## 2018-11-21 PROCEDURE — 25010000002 MORPHINE PER 10 MG: Performed by: INTERNAL MEDICINE

## 2018-11-21 PROCEDURE — 25010000002 FENTANYL CITRATE (PF) 100 MCG/2ML SOLUTION: Performed by: NURSE ANESTHETIST, CERTIFIED REGISTERED

## 2018-11-21 PROCEDURE — 25010000002 SUCCINYLCHOLINE PER 20 MG: Performed by: NURSE ANESTHETIST, CERTIFIED REGISTERED

## 2018-11-21 PROCEDURE — 25010000002 PHENYLEPHRINE PER 1 ML: Performed by: NURSE ANESTHETIST, CERTIFIED REGISTERED

## 2018-11-21 PROCEDURE — 25010000002 CEFAZOLIN PER 500 MG: Performed by: ORTHOPAEDIC SURGERY

## 2018-11-21 PROCEDURE — 25010000003 CEFAZOLIN PER 500 MG: Performed by: NURSE ANESTHETIST, CERTIFIED REGISTERED

## 2018-11-21 PROCEDURE — C1713 ANCHOR/SCREW BN/BN,TIS/BN: HCPCS | Performed by: ORTHOPAEDIC SURGERY

## 2018-11-21 PROCEDURE — C1769 GUIDE WIRE: HCPCS | Performed by: ORTHOPAEDIC SURGERY

## 2018-11-21 PROCEDURE — 80048 BASIC METABOLIC PNL TOTAL CA: CPT | Performed by: INTERNAL MEDICINE

## 2018-11-21 PROCEDURE — 25010000002 PROPOFOL 10 MG/ML EMULSION: Performed by: NURSE ANESTHETIST, CERTIFIED REGISTERED

## 2018-11-21 PROCEDURE — 85007 BL SMEAR W/DIFF WBC COUNT: CPT | Performed by: INTERNAL MEDICINE

## 2018-11-21 PROCEDURE — 76000 FLUOROSCOPY <1 HR PHYS/QHP: CPT

## 2018-11-21 DEVICE — IMPLANTABLE DEVICE: Type: IMPLANTABLE DEVICE | Status: FUNCTIONAL

## 2018-11-21 RX ORDER — LIDOCAINE HYDROCHLORIDE 20 MG/ML
INJECTION, SOLUTION INFILTRATION; PERINEURAL AS NEEDED
Status: DISCONTINUED | OUTPATIENT
Start: 2018-11-21 | End: 2018-11-21 | Stop reason: SURG

## 2018-11-21 RX ORDER — LABETALOL HYDROCHLORIDE 5 MG/ML
5 INJECTION, SOLUTION INTRAVENOUS
Status: DISCONTINUED | OUTPATIENT
Start: 2018-11-21 | End: 2018-11-21 | Stop reason: HOSPADM

## 2018-11-21 RX ORDER — MAGNESIUM HYDROXIDE 1200 MG/15ML
LIQUID ORAL AS NEEDED
Status: DISCONTINUED | OUTPATIENT
Start: 2018-11-21 | End: 2018-11-24 | Stop reason: HOSPADM

## 2018-11-21 RX ORDER — FENTANYL CITRATE 50 UG/ML
INJECTION, SOLUTION INTRAMUSCULAR; INTRAVENOUS AS NEEDED
Status: DISCONTINUED | OUTPATIENT
Start: 2018-11-21 | End: 2018-11-21 | Stop reason: SURG

## 2018-11-21 RX ORDER — SUCCINYLCHOLINE CHLORIDE 20 MG/ML
INJECTION INTRAMUSCULAR; INTRAVENOUS AS NEEDED
Status: DISCONTINUED | OUTPATIENT
Start: 2018-11-21 | End: 2018-11-21 | Stop reason: SURG

## 2018-11-21 RX ORDER — ONDANSETRON 2 MG/ML
4 INJECTION INTRAMUSCULAR; INTRAVENOUS ONCE AS NEEDED
Status: DISCONTINUED | OUTPATIENT
Start: 2018-11-21 | End: 2018-11-21 | Stop reason: HOSPADM

## 2018-11-21 RX ORDER — SODIUM CHLORIDE, SODIUM LACTATE, POTASSIUM CHLORIDE, CALCIUM CHLORIDE 600; 310; 30; 20 MG/100ML; MG/100ML; MG/100ML; MG/100ML
INJECTION, SOLUTION INTRAVENOUS CONTINUOUS PRN
Status: DISCONTINUED | OUTPATIENT
Start: 2018-11-21 | End: 2018-11-21 | Stop reason: SURG

## 2018-11-21 RX ORDER — PROPOFOL 10 MG/ML
VIAL (ML) INTRAVENOUS AS NEEDED
Status: DISCONTINUED | OUTPATIENT
Start: 2018-11-21 | End: 2018-11-21 | Stop reason: SURG

## 2018-11-21 RX ORDER — HYDROCODONE BITARTRATE AND ACETAMINOPHEN 7.5; 325 MG/1; MG/1
1 TABLET ORAL EVERY 6 HOURS PRN
Status: DISCONTINUED | OUTPATIENT
Start: 2018-11-21 | End: 2018-11-24 | Stop reason: HOSPADM

## 2018-11-21 RX ORDER — EPHEDRINE SULFATE 50 MG/ML
5 INJECTION, SOLUTION INTRAVENOUS ONCE AS NEEDED
Status: DISCONTINUED | OUTPATIENT
Start: 2018-11-21 | End: 2018-11-21 | Stop reason: HOSPADM

## 2018-11-21 RX ORDER — CEFAZOLIN SODIUM 1 G/3ML
INJECTION, POWDER, FOR SOLUTION INTRAMUSCULAR; INTRAVENOUS AS NEEDED
Status: DISCONTINUED | OUTPATIENT
Start: 2018-11-21 | End: 2018-11-21 | Stop reason: SURG

## 2018-11-21 RX ORDER — ACETAMINOPHEN 325 MG/1
650 TABLET ORAL ONCE AS NEEDED
Status: DISCONTINUED | OUTPATIENT
Start: 2018-11-21 | End: 2018-11-21 | Stop reason: HOSPADM

## 2018-11-21 RX ORDER — PROMETHAZINE HYDROCHLORIDE 25 MG/ML
12.5 INJECTION, SOLUTION INTRAMUSCULAR; INTRAVENOUS EVERY 6 HOURS PRN
Status: DISCONTINUED | OUTPATIENT
Start: 2018-11-21 | End: 2018-11-24 | Stop reason: HOSPADM

## 2018-11-21 RX ORDER — 0.9 % SODIUM CHLORIDE 0.9 %
VIAL (ML) INJECTION AS NEEDED
Status: DISCONTINUED | OUTPATIENT
Start: 2018-11-21 | End: 2018-11-24 | Stop reason: HOSPADM

## 2018-11-21 RX ORDER — BUPIVACAINE HCL/0.9 % NACL/PF 0.1 %
2 PLASTIC BAG, INJECTION (ML) EPIDURAL ONCE
Status: COMPLETED | OUTPATIENT
Start: 2018-11-21 | End: 2018-11-21

## 2018-11-21 RX ORDER — MEPERIDINE HYDROCHLORIDE 50 MG/ML
12.5 INJECTION INTRAMUSCULAR; INTRAVENOUS; SUBCUTANEOUS
Status: DISCONTINUED | OUTPATIENT
Start: 2018-11-21 | End: 2018-11-21 | Stop reason: HOSPADM

## 2018-11-21 RX ORDER — ACETAMINOPHEN 650 MG/1
650 SUPPOSITORY RECTAL ONCE AS NEEDED
Status: DISCONTINUED | OUTPATIENT
Start: 2018-11-21 | End: 2018-11-21 | Stop reason: HOSPADM

## 2018-11-21 RX ORDER — NALOXONE HCL 0.4 MG/ML
0.2 VIAL (ML) INJECTION AS NEEDED
Status: DISCONTINUED | OUTPATIENT
Start: 2018-11-21 | End: 2018-11-21 | Stop reason: HOSPADM

## 2018-11-21 RX ORDER — SODIUM CHLORIDE AND POTASSIUM CHLORIDE 150; 900 MG/100ML; MG/100ML
100 INJECTION, SOLUTION INTRAVENOUS CONTINUOUS
Status: DISCONTINUED | OUTPATIENT
Start: 2018-11-21 | End: 2018-11-24 | Stop reason: HOSPADM

## 2018-11-21 RX ORDER — SODIUM CHLORIDE 9 MG/ML
1000 INJECTION, SOLUTION INTRAVENOUS CONTINUOUS
Status: DISCONTINUED | OUTPATIENT
Start: 2018-11-21 | End: 2018-11-24 | Stop reason: HOSPADM

## 2018-11-21 RX ORDER — FLUMAZENIL 0.1 MG/ML
0.2 INJECTION INTRAVENOUS AS NEEDED
Status: DISCONTINUED | OUTPATIENT
Start: 2018-11-21 | End: 2018-11-21 | Stop reason: HOSPADM

## 2018-11-21 RX ORDER — DIPHENHYDRAMINE HYDROCHLORIDE 50 MG/ML
12.5 INJECTION INTRAMUSCULAR; INTRAVENOUS
Status: DISCONTINUED | OUTPATIENT
Start: 2018-11-21 | End: 2018-11-21 | Stop reason: HOSPADM

## 2018-11-21 RX ORDER — BACITRACIN 50000 [IU]/1
INJECTION, POWDER, FOR SOLUTION INTRAMUSCULAR AS NEEDED
Status: DISCONTINUED | OUTPATIENT
Start: 2018-11-21 | End: 2018-11-24 | Stop reason: HOSPADM

## 2018-11-21 RX ADMIN — CEFAZOLIN SODIUM 2 G: 1 INJECTION, POWDER, FOR SOLUTION INTRAMUSCULAR; INTRAVENOUS at 14:15

## 2018-11-21 RX ADMIN — PHENYLEPHRINE HYDROCHLORIDE 100 MCG: 10 INJECTION INTRAVENOUS at 15:15

## 2018-11-21 RX ADMIN — PHENYLEPHRINE HYDROCHLORIDE 100 MCG: 10 INJECTION INTRAVENOUS at 14:50

## 2018-11-21 RX ADMIN — PHENYLEPHRINE HYDROCHLORIDE 100 MCG: 10 INJECTION INTRAVENOUS at 14:43

## 2018-11-21 RX ADMIN — CEFAZOLIN SODIUM 2 G: 10 INJECTION, POWDER, FOR SOLUTION INTRAVENOUS at 19:26

## 2018-11-21 RX ADMIN — LIDOCAINE HYDROCHLORIDE 50 MG: 20 INJECTION, SOLUTION INFILTRATION; PERINEURAL at 14:38

## 2018-11-21 RX ADMIN — PHENYLEPHRINE HYDROCHLORIDE 100 MCG: 10 INJECTION INTRAVENOUS at 15:30

## 2018-11-21 RX ADMIN — SODIUM CHLORIDE, POTASSIUM CHLORIDE, SODIUM LACTATE AND CALCIUM CHLORIDE: 600; 310; 30; 20 INJECTION, SOLUTION INTRAVENOUS at 14:30

## 2018-11-21 RX ADMIN — PHENYLEPHRINE HYDROCHLORIDE 100 MCG: 10 INJECTION INTRAVENOUS at 14:55

## 2018-11-21 RX ADMIN — PHENYLEPHRINE HYDROCHLORIDE 100 MCG: 10 INJECTION INTRAVENOUS at 15:20

## 2018-11-21 RX ADMIN — PHENYLEPHRINE HYDROCHLORIDE 100 MCG: 10 INJECTION INTRAVENOUS at 15:10

## 2018-11-21 RX ADMIN — LATANOPROST 1 DROP: 50 SOLUTION/ DROPS OPHTHALMIC at 20:22

## 2018-11-21 RX ADMIN — FENTANYL CITRATE 50 MCG: 50 INJECTION, SOLUTION INTRAMUSCULAR; INTRAVENOUS at 14:38

## 2018-11-21 RX ADMIN — PHENYLEPHRINE HYDROCHLORIDE 100 MCG: 10 INJECTION INTRAVENOUS at 14:58

## 2018-11-21 RX ADMIN — PHENYLEPHRINE HYDROCHLORIDE 100 MCG: 10 INJECTION INTRAVENOUS at 14:45

## 2018-11-21 RX ADMIN — SUCCINYLCHOLINE CHLORIDE 50 MG: 20 INJECTION, SOLUTION INTRAMUSCULAR; INTRAVENOUS at 14:38

## 2018-11-21 RX ADMIN — MORPHINE SULFATE 4 MG: 4 INJECTION INTRAVENOUS at 05:26

## 2018-11-21 RX ADMIN — PHENYLEPHRINE HYDROCHLORIDE 100 MCG: 10 INJECTION INTRAVENOUS at 15:03

## 2018-11-21 RX ADMIN — POTASSIUM CHLORIDE AND SODIUM CHLORIDE 100 ML/HR: 900; 150 INJECTION, SOLUTION INTRAVENOUS at 10:30

## 2018-11-21 RX ADMIN — HYDROMORPHONE HYDROCHLORIDE 0.5 MG: 1 INJECTION, SOLUTION INTRAMUSCULAR; INTRAVENOUS; SUBCUTANEOUS at 16:43

## 2018-11-21 RX ADMIN — PROPOFOL 100 MG: 10 INJECTION, EMULSION INTRAVENOUS at 14:38

## 2018-11-21 NOTE — PLAN OF CARE
Problem: Fall Risk (Adult)  Goal: Absence of Fall  Outcome: Ongoing (interventions implemented as appropriate)      Problem: Fracture Orthopaedic (Adult)  Goal: Signs and Symptoms of Listed Potential Problems Will be Absent, Minimized or Managed (Fracture Orthopaedic)  Outcome: Ongoing (interventions implemented as appropriate)      Problem: Patient Care Overview  Goal: Plan of Care Review  Outcome: Ongoing (interventions implemented as appropriate)   11/21/18 1346   Coping/Psychosocial   Plan of Care Reviewed With patient   Plan of Care Review   Progress no change   OTHER   Outcome Summary VS stable; OR today     Goal: Individualization and Mutuality  Outcome: Ongoing (interventions implemented as appropriate)    Goal: Discharge Needs Assessment  Outcome: Ongoing (interventions implemented as appropriate)    Goal: Interprofessional Rounds/Family Conf  Outcome: Ongoing (interventions implemented as appropriate)      Problem: Skin Injury Risk (Adult)  Goal: Identify Related Risk Factors and Signs and Symptoms  Outcome: Ongoing (interventions implemented as appropriate)    Goal: Skin Health and Integrity  Outcome: Ongoing (interventions implemented as appropriate)

## 2018-11-21 NOTE — BRIEF OP NOTE
FEMUR INTRAMEDULLARY NAILING/RODDING  Progress Note    Maciej Balest  11/20/2018 - 11/21/2018    Pre-op Diagnosis:   Closed displaced intertrochanteric fracture of left femur, initial encounter (CMS/[facility]) [S72.142A]       Post-Op Diagnosis Codes:     * Closed displaced intertrochanteric fracture of left femur, initial encounter (CMS/[facility]) [S72.142A]    Procedure/CPT® Codes:  NV OPEN FIX INTER/SUBTROCH FX,IMPLNT [71198]  CHG RADEX HIP UNILATERAL WITH PELVIS 2-3 VIEWS [56064]    Procedure(s):  FEMUR INTRAMEDULLARY NAILING/RODDING, open reduction internal fixation left femur fracture    Surgeon(s):  Wu Hare MD    Anesthesia: General    Staff:   Circulator: Martha Juan RN; Donna Husain RN  Radiology Technologist: Jo-Ann Christie  Scrub Person: Piat Matthews; Mahnaz Smith    Estimated Blood Loss: 50    Urine Voided: * No values recorded between 11/21/2018  2:16 PM and 11/21/2018  3:53 PM *    Specimens:                none      Drains:   Urethral Catheter Silicone (Active)   Daily Indications Required activity restriction from trauma, surgery, (e.g. unstable spine, fracture, hemodynamics) 11/21/2018  8:29 AM   Site Assessment Clean;Skin intact 11/21/2018 12:47 PM   Collection Container Standard drainage bag 11/21/2018 12:47 PM   Securement Method Securing device 11/21/2018 12:47 PM   Catheter care complete Yes 11/21/2018  8:46 AM   Output (mL) 200 mL 11/21/2018 12:13 PM       Findings: transtrochanteric femur fracture    Complications: none      Wu Hare MD     Date: 11/21/2018  Time: 4:03 PM

## 2018-11-21 NOTE — NURSING NOTE
in room to speak with patient and family. Surgery canceled at this time. Report called to Heide HERNANDEZ.

## 2018-11-21 NOTE — ANESTHESIA PROCEDURE NOTES
ANESTHESIA INTUBATION  Airway not difficult    General Information and Staff    Patient location during procedure: OR    Indications and Patient Condition  Indications for airway management: airway protection    Preoxygenated: yes  Mask difficulty assessment: 1 - vent by mask    Final Airway Details  Final airway type: endotracheal airway      Successful airway: ETT  Cuffed: yes   Successful intubation technique: direct laryngoscopy  Endotracheal tube insertion site: oral  Blade: Alexandro  Blade size: 3  ETT size (mm): 7.5  Cormack-Lehane Classification: grade I - full view of glottis  Placement verified by: chest auscultation and capnometry   Measured from: lips  ETT to lips (cm): 22  Number of attempts at approach: 1

## 2018-11-21 NOTE — INTERVAL H&P NOTE
H&P reviewed. The patient was examined and there are no changes to the H&P.      Temp:  [96.9 °F (36.1 °C)-99.8 °F (37.7 °C)] 97.7 °F (36.5 °C)  Heart Rate:  [66-97] 90  Resp:  [18-20] 18  BP: (123-160)/(58-90) 149/69    No Known Allergies    Prior to Admission medications    Medication Sig Start Date End Date Taking? Authorizing Provider   aspirin 81 MG chewable tablet Chew 81 mg Daily.    Jt Ley MD   latanoprost (XALATAN) 0.005 % ophthalmic solution Administer 1 drop into the left eye Every Night.    Jt Ley MD   lisinopril (PRINIVIL,ZESTRIL) 20 MG tablet Take 1 tablet by mouth Daily. 4/11/18   Debbi Jackson MD   Multiple Vitamins-Minerals (VISION-HECTOR PRESERVE PO) Take 1 capsule by mouth 2 (Two) Times a Day. PreserVision Lutein 226 mg-200 unit-5 mg-0.8 mg capsule     Jt Ley MD   simvastatin (ZOCOR) 20 MG tablet Take 1 tablet by mouth Every Night. Bedtime 4/11/18   Debbi Jackson MD   varenicline (CHANTIX CONTINUING MONTH PAK) 1 MG tablet Take 1 tablet by mouth 2 (Two) Times a Day. 10/11/18   Debbi Jackson MD   varenicline (CHANTIX STARTING MONTH PAK) 0.5 MG X 11 & 1 MG X 42 tablet Take 0.5 mg one daily on days 1-3 and and 0.5 mg twice daily on days 4-7.Then 1 mg twice daily for a total of 12 weeks. 10/11/18   Debbi Jackson MD       Past Medical History:   Diagnosis Date   • Abdominal aortic aneurysm (CMS/HCC)    • Carotid stenosis, bilateral    • Cataract    • Emphysema of lung (CMS/HCC)    • Herpes zoster    • Hyperlipidemia    • Hypertension        Past Surgical History:   Procedure Laterality Date   • CARDIAC CATHETERIZATION     • COLONOSCOPY W/ POLYPECTOMY     • HAND SURGERY         Social History     Socioeconomic History   • Marital status:      Spouse name: Not on file   • Number of children: Not on file   • Years of education: Not on file   • Highest education level: Not on file   Social Needs   • Financial resource strain: Not on file    • Food insecurity - worry: Not on file   • Food insecurity - inability: Not on file   • Transportation needs - medical: Not on file   • Transportation needs - non-medical: Not on file   Occupational History   • Not on file   Tobacco Use   • Smoking status: Current Every Day Smoker     Packs/day: 1.00     Years: 60.00     Pack years: 60.00     Types: Cigarettes   • Smokeless tobacco: Former User   Substance and Sexual Activity   • Alcohol use: No   • Drug use: No   • Sexual activity: Not Currently   Other Topics Concern   • Not on file   Social History Narrative   • Not on file       Family History   Problem Relation Age of Onset   • Breast cancer Mother    • Hypertension Father          Closed displaced intertrochanteric fracture of left femur (CMS/HCC)    Hypertension    Emphysema of lung (CMS/HCC)    Atherosclerosis

## 2018-11-21 NOTE — ANESTHESIA POSTPROCEDURE EVALUATION
Patient: Maciej Funk    Procedure Summary     Date:  11/21/18 Room / Location:  [facility] OR  / [facility] OR    Anesthesia Start:  1437 Anesthesia Stop:  1556    Procedure:  FEMUR INTRAMEDULLARY NAILING/RODDING (Left Thigh) Diagnosis:       Closed displaced intertrochanteric fracture of left femur, initial encounter (CMS/[facility])      (Closed displaced intertrochanteric fracture of left femur, initial encounter (CMS/[facility]) [S72.142A])    Surgeon:  Wu Hare MD Provider:  Sly Muller MD    Anesthesia Type:  general ASA Status:  4          Anesthesia Type: general  Last vitals  BP   143/64 (11/21/18 1553)   Temp   97.2 °F (36.2 °C) (11/21/18 1553)   Pulse   96 (11/21/18 1553)   Resp   16 (11/21/18 1553)     SpO2   94 % (11/21/18 1553)     Post Anesthesia Care and Evaluation    Patient location during evaluation: PACU  Patient participation: complete - patient participated  Level of consciousness: awake and alert  Pain score: 1  Pain management: adequate  Airway patency: patent  Anesthetic complications: No anesthetic complications  PONV Status: none  Cardiovascular status: acceptable  Respiratory status: acceptable  Hydration status: acceptable  Post Neuraxial Block status: Motor and sensory function returned to baseline

## 2018-11-21 NOTE — PROGRESS NOTES
Patient is a 83 y.o. male admitted for a fall sustained at home. He claims to have felt dizzy, maybe even have a near syncope severe enough to fall and injure his L hip.     History  Medical History        Past Medical History:   Diagnosis Date   • Abdominal aortic aneurysm (CMS/HCC)     • Carotid stenosis, bilateral     • Cataract     • Emphysema of lung (CMS/HCC)     • Herpes zoster     • Hyperlipidemia     • Hypertension           Surgical History         Past Surgical History:   Procedure Laterality Date   • CARDIAC CATHETERIZATION       • COLONOSCOPY W/ POLYPECTOMY       • Left carotid endarterectomy    01/04/2017          Carotid U/S    11/20/2018:   1.  The right mid ICA displays a calcified plaque with estimated  stenosis of 50-69%.  2. Elevated flow velocities in the left vertebral artery  suggesting estimated stenosis of 50-69%.     11/20/2017:  • Right ICA Prox: Imaging of the right ICA indicates 50-69% stenosis.  • Right Vertebral: Antegrade flow is present.   Imaging of the right ICA indicates 50-69% stenosis.    • Left ICA Prox: Imaging of the left ICA indicates 0-49% stenosis.  • Left Vertebral: Antegrade flow present.   Imaging of the left ICA indicates 0-49% stenosis    Impression:    No change in AISHA from 1 yr prior U/S. Pt based on clinical findings did not exhibit sx of carotid disease. Therefore, it is safe to proceed with orthopedic surgical intervention

## 2018-11-21 NOTE — PROGRESS NOTES
Discharge Planning Assessment  [facility]     Patient Name: Maciej Funk  MRN: [id number]  Today's Date: 11/21/2018    Admit Date: 11/20/2018    Discharge Needs Assessment     Row Name 11/21/18 1202       Living Environment    Lives With  spouse    Current Living Arrangements  home/apartment/condo    Primary Care Provided by  self    Provides Primary Care For  no one    Family Caregiver if Needed  child(durga), adult;spouse    Quality of Family Relationships  helpful;supportive    Living Arrangement Comments  Pt resides at home with spouse. pt voiced good support system.        Resource/Environmental Concerns    Transportation Concerns  car, none       Transition Planning    Patient/Family Anticipates Transition to  home    Patient/Family Anticipated Services at Transition  home health care    Transportation Anticipated  family or friend will provide       Discharge Needs Assessment    Concerns to be Addressed  adjustment to diagnosis/illness    Equipment Currently Used at Home  walker, rolling;cane, straight    Equipment Needed After Discharge  -- Awaiting therapy recomendations following surgery.     Discharge Facility/Level of Care Needs  home with home health    Current Discharge Risk  physical impairment    Discharge Coordination/Progress  Pt plans to return home at d/c. Agreeable to home health.         Discharge Plan     Row Name 11/21/18 1206       Plan    Plan Comments  LSW assesment complete. pt resides at home with spouse. pt voiced good support system. pt informed LSW that he uses cane and walker to assist with mobility. Pt fell at home in kitchen and fx hip. His goal is to return home following d/c from hospital. He is agreeable to home health. LSW awaiting additional recomendations from MD and therapy. LSW/case mgt will follow up as consulted and complete arrangements as ordered.     Row Name 11/21/18 110       Plan    Plan Comments  fx. hip. plan surg today. Padmaja Mcgowan RN CCM         Destination      No service coordination in this encounter.      Durable Medical Equipment      No service coordination in this encounter.      Dialysis/Infusion      No service coordination in this encounter.      Home Medical Care      No service coordination in this encounter.      Community Resources      No service coordination in this encounter.        Expected Discharge Date and Time     Expected Discharge Date Expected Discharge Time    Nov 23, 2018         Demographic Summary     Row Name 11/21/18 1159       General Information    Admission Type  inpatient    Referral Source  high risk screening    Reason for Consult  discharge planning    Preferred Language  English     Used During This Interaction  no       Contact Information    Contact Information Obtained for          Functional Status     Row Name 11/21/18 1201       Functional Status    Usual Activity Tolerance  moderate    Current Activity Tolerance  poor    Functional Status Comments  Pt uses cane and walker at home to assist with mobility.        Functional Status, IADL    Medications  independent    Meal Preparation  independent    Housekeeping  assistive person    Laundry  assistive person    Shopping  assistive person       Mental Status Summary    Recent Changes in Mental Status/Cognitive Functioning  no changes        Psychosocial    No documentation.       Abuse/Neglect    No documentation.       Legal    No documentation.       Substance Abuse    No documentation.       Patient Forms    No documentation.           RICARDO Kenney

## 2018-11-21 NOTE — PROGRESS NOTES
[facility]  INPATIENT PROGRESS NOTE    Length of Stay: 1  Date of Admission: 11/20/2018  Primary Care Physician: Debbi Jackson MD    Subjective   Chief Complaint: Closed displaced intertrochanteric fracture of left femur    HPI:  This 83-year-old  male reported to the emergency department secondary to a fall at home.  Fall resulted in injury of the left hip, a minimally displaced intertrochanteric fracture.  Patient scheduled for surgical repair with Dr. Hare today.  Patient did fall secondary to dizziness.  It is unclear whether or not he lost consciousness as he denies loss of consciousness, however his wife reports that she found him unconscious.  Patient currently has no chest pain or shortness of air, no dizziness, no syncope, no palpitations.    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 Ervin, this is unchanged since previous ultrasounds.  Dr. Ervin discussed the case with anesthesiology and determined the patient was stable and cleared for surgery.    As emergency department provider was concerned that there could be a AAA, CT angiogram of the aorta was performed.  There was no aortic aneurysm found on film.    Echocardiogram performed in 2016 demonstrated ejection fraction of 50-55% without regional abnormalities.  There was grade 1 diastolic dysfunction, without significant valvular dysfunction.    EKG did demonstrate a left bundle branch block, however patient has demonstrated this left bundle-branch block on previous EKGs, most recently available in January 2017.    Patient reports no seizure activity, no loss of bowel or bladder function, no focal weakness, no slurring of speech or drooping of face.  CT of the head demonstrates no acute findings.    HPI:  83- year-old male presented to the ER on 11/20 due to falling at home and injuring his left hip. He admits  to falling due to dizziness, but does not believe he lost consciousness. He was awaiting fixation of the left femur fracture this morning (11/21/18). Cardiology was consulted and cleared him for the surgery. The patient denies any current dizziness, headaches, shortness of breath, chest pain, or abdominal pain. The patient admits to a 2ppd history of smoking.     Review of Systems   Constitutional: Negative for activity change, appetite change, chills, fatigue and fever.   Respiratory: Negative for cough, shortness of breath, wheezing and stridor.    Cardiovascular: Negative for chest pain, palpitations and leg swelling.   Gastrointestinal: Negative for abdominal distention, abdominal pain, blood in stool, constipation, diarrhea, nausea and vomiting.   Endocrine: Negative for polyuria.   Genitourinary: Negative for difficulty urinating and dysuria.   Musculoskeletal: Positive for arthralgias.   Skin: Negative for color change and pallor.   Neurological: Positive for dizziness. Negative for seizures, light-headedness, numbness and headaches.   Psychiatric/Behavioral: Negative for agitation, behavioral problems and confusion.      All pertinent negatives and positives are as above. All other systems have been reviewed and are negative unless otherwise stated.     Objective    Temp:  [96.9 °F (36.1 °C)-99.8 °F (37.7 °C)] 97.7 °F (36.5 °C)  Heart Rate:  [79-97] 90  Resp:  [18-20] 18  BP: (123-160)/(58-90) 149/69    Physical Exam   Constitutional: He is oriented to person, place, and time. He appears well-developed and well-nourished. No distress.   HENT:   Head: Normocephalic and atraumatic.   Eyes: Conjunctivae are normal. Pupils are equal, round, and reactive to light.   Neck: Neck supple.   Cardiovascular: Normal rate, regular rhythm and normal heart sounds.   Pulmonary/Chest: Effort normal and breath sounds normal. No stridor. No respiratory distress. He has no wheezes. He has no rales.   Abdominal: Soft. Bowel  sounds are normal. He exhibits no distension. There is no tenderness.   Musculoskeletal:   Distal pulses intact   Neurological: He is alert and oriented to person, place, and time.   Skin: Skin is warm and dry. Capillary refill takes 2 to 3 seconds. He is not diaphoretic.   Psychiatric: He has a normal mood and affect. His behavior is normal.       Results Review:  I have reviewed the labs, radiology results, and diagnostic studies.    Laboratory Data:   Results from last 7 days   Lab Units  11/21/18   0600  11/20/18   1048   SODIUM mmol/L  135*  136*   POTASSIUM mmol/L  4.4  3.8   CHLORIDE mmol/L  105  102   CO2 mmol/L  25.0  26.0   BUN mg/dL  20  20   CREATININE mg/dL  0.94  1.20   GLUCOSE mg/dL  94  132*   CALCIUM mg/dL  8.2*  8.7   BILIRUBIN mg/dL   --   1.1   ALK PHOS U/L   --   68   ALT (SGPT) U/L   --   18*   AST (SGOT) U/L   --   19   ANION GAP mmol/L  5.0  8.0     Estimated Creatinine Clearance: 69.3 mL/min (by C-G formula based on SCr of 0.94 mg/dL).  Results from last 7 days   Lab Units  11/20/18   1048   MAGNESIUM mg/dL  2.0         Results from last 7 days   Lab Units  11/21/18   0600  11/20/18   1048   WBC 10*3/mm3  7.81  17.16*   HEMOGLOBIN g/dL  11.1*  12.7*   HEMATOCRIT %  31.8*  36.6*   PLATELETS 10*3/mm3  187  227     Results from last 7 days   Lab Units  11/20/18   1048   INR   1.10       Culture Data:   No results found for: BLOODCX  Urine Culture   Date Value Ref Range Status   11/20/2018 Culture in progress  Preliminary     No results found for: RESPCX  No results found for: WOUNDCX  No results found for: STOOLCX  No components found for: BODYFLD    Radiology Data:   Imaging Results (last 24 hours)     Procedure Component Value Units Date/Time    US Carotid Bilateral [[id number]] Collected:  11/20/18 1805     Updated:  11/21/18 0030    Narrative:         .     EXAMINATION:  Ultrasound, carotid        CLINICAL INDICATION / HISTORY:  Preoperative evaluation for hip  repair     COMPARISON:  none     TECHNIQUE:  Gray scale, color-flow Doppler, and spectral wave  analysis images        FINDINGS:         (PSV peak systolic velocity  (cm/s) / EDV end diastolic velocity  (cm/s)                                         RIGHT  (cm/s)                               -------------------                          CCA                        76/21      ICA (prox)               98/31     ICA (mid)               142/45 *  ICA (dist)               59/28     ECA                       124/10    vertebral                 29/14    ICA/CCA                1.9/2.2                                         LEFT   (cm/s)                             -----------------  CCA                        104/35      ICA (prox)               79/23     ICA (mid)               90/34   ICA (dist)               79/31     ECA                       95/19    vertebral                 144/51  *  ICA/CCA                0.9/1.0       Subjective evaluation of the images:    - Right:  The right mid ICA displays a calcified plaque with  elevated flow velocities estimating stenosis of 50-69%.    - Left:     No focal lesion associated with elevated flow  velocities associated with the common or internal carotid  arteries. The vertebral artery has elevated flow velocities as  above suggesting stenosis in the 50-69% range.            .    Impression:        CONCLUSION:  1.  The right mid ICA displays a calcified plaque with estimated  stenosis of 50-69%.  2. Elevated flow velocities in the left vertebral artery  suggesting estimated stenosis of 50-69%.                    Electronically signed by:  TRE Giraldo MD  11/20/2018 6:34  PM CST Workstation: [id number]          I have reviewed the patient's current medications.     Assessment/Plan     Active Hospital Problems    Diagnosis   • **Closed displaced intertrochanteric fracture of left femur (CMS/HCC)   • Atherosclerosis   • Emphysema of lung (CMS/HCC)   • Hypertension       Plan:  Surgical repair today with   Payam, defer anticoagulation and pain control as well as PT and OT to orthopedics.  Should be noted the patient is a heavy tobacco user and is prone to respiratory decompensation, therefore pain medication should be administered judiciously.  Echocardiogram, repeat EKG in the a.m., patient will not only need a cardiology follow-up, but will also need CT follow-up for the indeterminate findings on CT.  CT demonstrates right kidney hypodense lesion, likely a hyperdense cyst, however again CT follow-up in 3-6 months.  Patient should be counseled appropriately.                  This document has been electronically signed by RISHI Ching on November 21, 2018 2:36 PM

## 2018-11-21 NOTE — ANESTHESIA PREPROCEDURE EVALUATION
Anesthesia Evaluation     Patient summary reviewed and Nursing notes reviewed   no history of anesthetic complications:  NPO Solid Status: > 8 hours  NPO Liquid Status: > 8 hours           Airway   Mallampati: II  TM distance: >3 FB  Neck ROM: full  Possible difficult intubation  Dental    (+) lower dentures and upper dentures    Pulmonary    (+) a smoker (1 PPD) Current Smoked day of surgery, COPD moderate, decreased breath sounds, wheezes,     ROS comment:      FINDINGS:             - lines/tubes:    none     - cardiac:         size within normal limits         - mediastinum: Fullness in the right paratracheal and right  suprahilar regions, suspicious for the presence of a mass lesion.          - lungs:         no focal air space process, pulmonary  interstitial edema, nodule(s)/mass             - pleura:         no evidence of  fluid                  - osseous:         unremarkable for age                  - misc.:       IMPRESSION:  CONCLUSION:        1. Mass lesion suspected present in the right suprahilar region.  Correlation with contrast-enhanced CT recommended.                                                               Electronically signed by:  TRE Giraldo MD  11/20/2018 11:44  AM CST Workstation: [id number]  Cardiovascular   Exercise tolerance: good (4-7 METS)    ECG reviewed  Rhythm: regular  Rate: normal    (+) hypertension, murmur, carotid bruits (Left.), PVD, hyperlipidemia,  carotid artery disease carotid bilateral  (-) past MI, angina    ROS comment: EF 50-55%Date/Time: 11/20/2018 10:16 AM  Performed by: Thomas Jones PA-C  Authorized by: Prabhu Jones MD   Interpreted by physician  Comparison: compared with previous ECG from 12/30/2016  Similar to previous ECG  Rhythm: sinus rhythm  Rate: normal  BPM: 75  Conduction: complete LBBB and 1st degree  Interpreted by Thomas Jones PA-C on 11/20/2018  1:13 PM        Neuro/Psych- negative ROS  (-) TIA, CVA    ROS Comment:  "Patient underwent left carotid endarterectomy \"couple of years ago\". Wife reports she found her  this AM in kitchen after \"falling\" from his chair or \"slipping\" on slick floor.   GI/Hepatic/Renal/Endo - negative ROS   (-) GERD, liver disease, no renal disease, diabetes, hypothyroidism    Musculoskeletal     (+) back pain,       ROS comment: Left hip fracture from fall at home  Abdominal  - normal exam   Substance History - negative use     OB/GYN negative ob/gyn ROS         Other   (+) arthritis     (-) history of cancer      Phys Exam Other: OK for surgery per Dr Ervin                Anesthesia Plan    ASA 4     general   (Will discuss physical findings and chest x-ray results from exam in the ER with Dr. Hare. Results and findings discussed with surgeon in OR. Doppler of carotids ordered by Hospital Physician. Findings from bilateral ultrasound revealed significant carotid and vertebral artery stenosis. Discussed with Dr. Hare who decided to postpone surgery for further evaluation of carotid artery stenosis. Explained to family the findings and postponement who understand and agree. Dr. Hare to followup with family and medical care providers.    Cleared per Dr Ervin)  intravenous induction   Anesthetic plan, all risks, benefits, and alternatives have been provided, discussed and informed consent has been obtained with: patient, spouse/significant other and child.      "

## 2018-11-21 NOTE — OP NOTE
Diagnosis; transtrochanteric femur fracture left femur  Diagnosis; transtrochanteric femur fracture left femur  Surgery intramedullary nail open reduction internal fixation left transtrochanteric femur fracture, left femur, xray left hip  General anesthesia  Surgeon Mariam  Blood loss 50  The patient is brought to the operating room, the patient is anesthetized, airway is secured. The patient is positioned, legs placed in traction. The left hip is washed, sterile drapes.  Sterile technique is performed throughout the course of the surgery.  The consent and IV antibiotics are reviewed.  An incision is placed on the lateral thigh, I dissected to the greater trochanter.  The femur is reduced openly.  I advanced a guidewire into the greater trochanter, I reamed the greater trochanter.  A 11mm synthes nail is positioned.  The guidearm barrel is placed. A guidewire is positioned within the femoral neck.  I advanced the guidewire to the femoral head, the position was confirmed with xray.  I measured a 105mm bolt, drilled the femoral neck guidewire.  I impacted the bolt into the subchondral bone.  The position is confirmed with xray.  The bolt is positioned within the femoral head/ neck.  The bolt is locked with internal locking mechanism.  I placed interlocking screw.  The guidearm is dissasembled.  The xray shows reduction and placement of fracture appliance in excellent position.  The wound is irrigated.  The wound is sutured and staples placed over the skin.  Sterile dressings are placed.  No complications.

## 2018-11-21 NOTE — PLAN OF CARE
Problem: Fall Risk (Adult)  Goal: Absence of Fall  Outcome: Ongoing (interventions implemented as appropriate)      Problem: Fracture Orthopaedic (Adult)  Goal: Signs and Symptoms of Listed Potential Problems Will be Absent, Minimized or Managed (Fracture Orthopaedic)  Outcome: Ongoing (interventions implemented as appropriate)      Problem: Patient Care Overview  Goal: Plan of Care Review  Outcome: Ongoing (interventions implemented as appropriate)   11/21/18 0339   Coping/Psychosocial   Plan of Care Reviewed With patient   Plan of Care Review   Progress no change   OTHER   Outcome Summary surgery cancelled for 11/20; pt rested well during the night; waiting on cadio clearance     Goal: Individualization and Mutuality  Outcome: Ongoing (interventions implemented as appropriate)    Goal: Discharge Needs Assessment  Outcome: Ongoing (interventions implemented as appropriate)      Problem: Skin Injury Risk (Adult)  Goal: Identify Related Risk Factors and Signs and Symptoms  Outcome: Ongoing (interventions implemented as appropriate)    Goal: Skin Health and Integrity  Outcome: Ongoing (interventions implemented as appropriate)

## 2018-11-22 ENCOUNTER — APPOINTMENT (OUTPATIENT)
Dept: CARDIOLOGY | Facility: HOSPITAL | Age: 83
End: 2018-11-22

## 2018-11-22 LAB
ALBUMIN SERPL-MCNC: 3.1 G/DL (ref 3.4–4.8)
ALBUMIN/GLOB SERPL: 1.3 G/DL (ref 1.1–1.8)
ALP SERPL-CCNC: 50 U/L (ref 38–126)
ALT SERPL W P-5'-P-CCNC: 18 U/L (ref 21–72)
ANION GAP SERPL CALCULATED.3IONS-SCNC: 7 MMOL/L (ref 5–15)
AST SERPL-CCNC: 28 U/L (ref 17–59)
BACTERIA SPEC AEROBE CULT: NORMAL
BASOPHILS # BLD AUTO: 0.02 10*3/MM3 (ref 0–0.2)
BASOPHILS NFR BLD AUTO: 0.2 % (ref 0–2)
BH CV ECHO MEAS - ACS: 2.1 CM
BH CV ECHO MEAS - AO MAX PG (FULL): 8.4 MMHG
BH CV ECHO MEAS - AO MAX PG: 11.2 MMHG
BH CV ECHO MEAS - AO MEAN PG (FULL): 4 MMHG
BH CV ECHO MEAS - AO MEAN PG: 6 MMHG
BH CV ECHO MEAS - AO ROOT AREA (BSA CORRECTED): 2
BH CV ECHO MEAS - AO ROOT AREA: 11.3 CM^2
BH CV ECHO MEAS - AO ROOT DIAM: 3.8 CM
BH CV ECHO MEAS - AO V2 MAX: 167 CM/SEC
BH CV ECHO MEAS - AO V2 MEAN: 118 CM/SEC
BH CV ECHO MEAS - AO V2 VTI: 25 CM
BH CV ECHO MEAS - AVA(I,A): 1.7 CM^2
BH CV ECHO MEAS - AVA(I,D): 1.7 CM^2
BH CV ECHO MEAS - AVA(V,A): 1.6 CM^2
BH CV ECHO MEAS - AVA(V,D): 1.6 CM^2
BH CV ECHO MEAS - BSA(HAYCOCK): 1.9 M^2
BH CV ECHO MEAS - BSA: 1.9 M^2
BH CV ECHO MEAS - BZI_BMI: 23.6 KILOGRAMS/M^2
BH CV ECHO MEAS - BZI_METRIC_HEIGHT: 175.3 CM
BH CV ECHO MEAS - BZI_METRIC_WEIGHT: 72.6 KG
BH CV ECHO MEAS - EDV(CUBED): 112.7 ML
BH CV ECHO MEAS - EDV(MOD-SP2): 101 ML
BH CV ECHO MEAS - EDV(MOD-SP4): 82.7 ML
BH CV ECHO MEAS - EDV(TEICH): 109.1 ML
BH CV ECHO MEAS - EF(CUBED): 47.4 %
BH CV ECHO MEAS - EF(MOD-SP2): 29.8 %
BH CV ECHO MEAS - EF(MOD-SP4): 29.6 %
BH CV ECHO MEAS - EF(TEICH): 39.6 %
BH CV ECHO MEAS - ESV(CUBED): 59.3 ML
BH CV ECHO MEAS - ESV(MOD-SP2): 70.9 ML
BH CV ECHO MEAS - ESV(MOD-SP4): 58.2 ML
BH CV ECHO MEAS - ESV(TEICH): 65.9 ML
BH CV ECHO MEAS - FS: 19.3 %
BH CV ECHO MEAS - IVS/LVPW: 1.2
BH CV ECHO MEAS - IVSD: 1.4 CM
BH CV ECHO MEAS - LA DIMENSION: 3.1 CM
BH CV ECHO MEAS - LA/AO: 0.82
BH CV ECHO MEAS - LV DIASTOLIC VOL/BSA (35-75): 44 ML/M^2
BH CV ECHO MEAS - LV MASS(C)D: 233.2 GRAMS
BH CV ECHO MEAS - LV MASS(C)DI: 124.1 GRAMS/M^2
BH CV ECHO MEAS - LV MAX PG: 2.7 MMHG
BH CV ECHO MEAS - LV MEAN PG: 2 MMHG
BH CV ECHO MEAS - LV SYSTOLIC VOL/BSA (12-30): 31 ML/M^2
BH CV ECHO MEAS - LV V1 MAX: 82.9 CM/SEC
BH CV ECHO MEAS - LV V1 MEAN: 58.5 CM/SEC
BH CV ECHO MEAS - LV V1 VTI: 13.9 CM
BH CV ECHO MEAS - LVIDD: 4.8 CM
BH CV ECHO MEAS - LVIDS: 3.9 CM
BH CV ECHO MEAS - LVLD AP2: 7.7 CM
BH CV ECHO MEAS - LVLD AP4: 8.4 CM
BH CV ECHO MEAS - LVLS AP2: 8 CM
BH CV ECHO MEAS - LVLS AP4: 7.5 CM
BH CV ECHO MEAS - LVOT AREA (M): 3.1 CM^2
BH CV ECHO MEAS - LVOT AREA: 3.1 CM^2
BH CV ECHO MEAS - LVOT DIAM: 2 CM
BH CV ECHO MEAS - LVPWD: 1.1 CM
BH CV ECHO MEAS - MV A MAX VEL: 80.8 CM/SEC
BH CV ECHO MEAS - MV DEC SLOPE: 1393 CM/SEC^2
BH CV ECHO MEAS - MV E MAX VEL: 148 CM/SEC
BH CV ECHO MEAS - MV E/A: 1.8
BH CV ECHO MEAS - MV MAX PG: 10.9 MMHG
BH CV ECHO MEAS - MV MEAN PG: 5 MMHG
BH CV ECHO MEAS - MV P1/2T MAX VEL: 165 CM/SEC
BH CV ECHO MEAS - MV P1/2T: 34.7 MSEC
BH CV ECHO MEAS - MV V2 MAX: 165 CM/SEC
BH CV ECHO MEAS - MV V2 MEAN: 98.2 CM/SEC
BH CV ECHO MEAS - MV V2 VTI: 18.8 CM
BH CV ECHO MEAS - MVA P1/2T LCG: 1.3 CM^2
BH CV ECHO MEAS - MVA(P1/2T): 6.3 CM^2
BH CV ECHO MEAS - MVA(VTI): 2.3 CM^2
BH CV ECHO MEAS - PA MAX PG: 5.9 MMHG
BH CV ECHO MEAS - PA V2 MAX: 121 CM/SEC
BH CV ECHO MEAS - RAP SYSTOLE: 5 MMHG
BH CV ECHO MEAS - SI(AO): 150.9 ML/M^2
BH CV ECHO MEAS - SI(CUBED): 28.4 ML/M^2
BH CV ECHO MEAS - SI(LVOT): 23.2 ML/M^2
BH CV ECHO MEAS - SI(MOD-SP2): 16 ML/M^2
BH CV ECHO MEAS - SI(MOD-SP4): 13 ML/M^2
BH CV ECHO MEAS - SI(TEICH): 23 ML/M^2
BH CV ECHO MEAS - SV(AO): 283.5 ML
BH CV ECHO MEAS - SV(CUBED): 53.4 ML
BH CV ECHO MEAS - SV(LVOT): 43.7 ML
BH CV ECHO MEAS - SV(MOD-SP2): 30.1 ML
BH CV ECHO MEAS - SV(MOD-SP4): 24.5 ML
BH CV ECHO MEAS - SV(TEICH): 43.2 ML
BILIRUB SERPL-MCNC: 1.1 MG/DL (ref 0.2–1.3)
BUN BLD-MCNC: 18 MG/DL (ref 7–21)
BUN/CREAT SERPL: 18.6 (ref 7–25)
CALCIUM SPEC-SCNC: 7.9 MG/DL (ref 8.4–10.2)
CHLORIDE SERPL-SCNC: 102 MMOL/L (ref 95–110)
CO2 SERPL-SCNC: 26 MMOL/L (ref 22–31)
CREAT BLD-MCNC: 0.97 MG/DL (ref 0.7–1.3)
DEPRECATED RDW RBC AUTO: 39.8 FL (ref 35.1–43.9)
EOSINOPHIL # BLD AUTO: 0.03 10*3/MM3 (ref 0–0.7)
EOSINOPHIL NFR BLD AUTO: 0.3 % (ref 0–7)
ERYTHROCYTE [DISTWIDTH] IN BLOOD BY AUTOMATED COUNT: 12.1 % (ref 11.5–14.5)
GFR SERPL CREATININE-BSD FRML MDRD: 74 ML/MIN/1.73 (ref 42–98)
GLOBULIN UR ELPH-MCNC: 2.4 GM/DL (ref 2.3–3.5)
GLUCOSE BLD-MCNC: 130 MG/DL (ref 60–100)
HCT VFR BLD AUTO: 29.3 % (ref 39–49)
HGB BLD-MCNC: 10.2 G/DL (ref 13.7–17.3)
IMM GRANULOCYTES # BLD: 0.03 10*3/MM3 (ref 0–0.02)
IMM GRANULOCYTES NFR BLD: 0.3 % (ref 0–0.5)
LYMPHOCYTES # BLD AUTO: 1.47 10*3/MM3 (ref 0.6–4.2)
LYMPHOCYTES NFR BLD AUTO: 16.9 % (ref 10–50)
MAXIMAL PREDICTED HEART RATE: 139 BPM
MCH RBC QN AUTO: 31.2 PG (ref 26.5–34)
MCHC RBC AUTO-ENTMCNC: 34.8 G/DL (ref 31.5–36.3)
MCV RBC AUTO: 89.6 FL (ref 80–98)
MONOCYTES # BLD AUTO: 0.66 10*3/MM3 (ref 0–0.9)
MONOCYTES NFR BLD AUTO: 7.6 % (ref 0–12)
NEUTROPHILS # BLD AUTO: 6.51 10*3/MM3 (ref 2–8.6)
NEUTROPHILS NFR BLD AUTO: 74.7 % (ref 37–80)
PLATELET # BLD AUTO: 155 10*3/MM3 (ref 150–450)
PMV BLD AUTO: 9.3 FL (ref 8–12)
POTASSIUM BLD-SCNC: 4.4 MMOL/L (ref 3.5–5.1)
PROT SERPL-MCNC: 5.5 G/DL (ref 6.3–8.6)
RBC # BLD AUTO: 3.27 10*6/MM3 (ref 4.37–5.74)
SODIUM BLD-SCNC: 135 MMOL/L (ref 137–145)
STRESS TARGET HR: 118 BPM
WBC NRBC COR # BLD: 8.72 10*3/MM3 (ref 3.2–9.8)

## 2018-11-22 PROCEDURE — 97162 PT EVAL MOD COMPLEX 30 MIN: CPT | Performed by: PHYSICAL THERAPIST

## 2018-11-22 PROCEDURE — 80053 COMPREHEN METABOLIC PANEL: CPT | Performed by: PHYSICIAN ASSISTANT

## 2018-11-22 PROCEDURE — G8987 SELF CARE CURRENT STATUS: HCPCS

## 2018-11-22 PROCEDURE — 97530 THERAPEUTIC ACTIVITIES: CPT

## 2018-11-22 PROCEDURE — G8979 MOBILITY GOAL STATUS: HCPCS | Performed by: PHYSICAL THERAPIST

## 2018-11-22 PROCEDURE — 85025 COMPLETE CBC W/AUTO DIFF WBC: CPT | Performed by: PHYSICIAN ASSISTANT

## 2018-11-22 PROCEDURE — 93306 TTE W/DOPPLER COMPLETE: CPT | Performed by: INTERNAL MEDICINE

## 2018-11-22 PROCEDURE — 97110 THERAPEUTIC EXERCISES: CPT

## 2018-11-22 PROCEDURE — G8978 MOBILITY CURRENT STATUS: HCPCS | Performed by: PHYSICAL THERAPIST

## 2018-11-22 PROCEDURE — 97166 OT EVAL MOD COMPLEX 45 MIN: CPT

## 2018-11-22 PROCEDURE — 99024 POSTOP FOLLOW-UP VISIT: CPT | Performed by: ORTHOPAEDIC SURGERY

## 2018-11-22 PROCEDURE — 97110 THERAPEUTIC EXERCISES: CPT | Performed by: PHYSICAL THERAPIST

## 2018-11-22 PROCEDURE — 93306 TTE W/DOPPLER COMPLETE: CPT

## 2018-11-22 PROCEDURE — G8988 SELF CARE GOAL STATUS: HCPCS

## 2018-11-22 PROCEDURE — 25810000003 SODIUM CHLORIDE 0.9 % WITH KCL 20 MEQ 20-0.9 MEQ/L-% SOLUTION: Performed by: ORTHOPAEDIC SURGERY

## 2018-11-22 RX ADMIN — POTASSIUM CHLORIDE AND SODIUM CHLORIDE 100 ML/HR: 900; 150 INJECTION, SOLUTION INTRAVENOUS at 13:22

## 2018-11-22 RX ADMIN — HYDROCODONE BITARTRATE AND ACETAMINOPHEN 1 TABLET: 7.5; 325 TABLET ORAL at 15:04

## 2018-11-22 RX ADMIN — HYDROCODONE BITARTRATE AND ACETAMINOPHEN 1 TABLET: 7.5; 325 TABLET ORAL at 03:06

## 2018-11-22 RX ADMIN — LISINOPRIL 20 MG: 20 TABLET ORAL at 08:43

## 2018-11-22 NOTE — THERAPY EVALUATION
Acute Care - Occupational Therapy Initial Evaluation  [facility]     Patient Name: Maciej Funk  : 1935  MRN: [id number]  Today's Date: 2018  Onset of Illness/Injury or Date of Surgery: 18  Date of Referral to OT: 18  Referring Physician: TRESA Villatoro MD.    Admit Date: 2018       ICD-10-CM ICD-9-CM   1. Closed displaced intertrochanteric fracture of left femur, initial encounter (CMS/[facility]) S72.142A 820.21   2. Mass of right lung R91.8 786.6   3. Closed displaced subtrochanteric fracture of left femur, initial encounter (CMS/[facility]) S72.22XA 820.22   4. Syncope, unspecified syncope type R55 780.2   5. Impaired functional mobility and activity tolerance Z74.09 V49.89   6. Impaired mobility and activities of daily living Z74.09 799.89     Patient Active Problem List   Diagnosis   • Hypertension   • History of adenomatous polyp of colon   • Hyperlipidemia   • Tobacco dependence syndrome   • Nicotine dependence, cigarettes, with other nicotine-induced disorders   • Carotid stenosis, bilateral   • Optic atrophy   • Nonexudative age-related macular degeneration   • Nuclear cataract   • Follow-up surgery care   • Closed displaced intertrochanteric fracture of left femur (CMS/HCC)   • Pulmonary hypertension (CMS/HCC)   • Emphysema of lung (CMS/HCC)   • Atherosclerosis     Past Medical History:   Diagnosis Date   • Abdominal aortic aneurysm (CMS/[facility])    • Carotid stenosis, bilateral    • Cataract    • Emphysema of lung (CMS/HCC)    • Herpes zoster    • Hyperlipidemia    • Hypertension      Past Surgical History:   Procedure Laterality Date   • CARDIAC CATHETERIZATION     • COLONOSCOPY     • COLONOSCOPY W/ POLYPECTOMY     • HAND SURGERY            OT ASSESSMENT FLOWSHEET (last 72 hours)      Occupational Therapy Evaluation     Row Name 18 1038                   OT Evaluation Time/Intention    Subjective Information  complains of;pain  -RB        Document Type  evaluation  -RB         Mode of Treatment  individual therapy;occupational therapy  -RB        Patient Effort  good  -RB           General Information    Patient Profile Reviewed?  yes  -RB        Onset of Illness/Injury or Date of Surgery  11/21/18  -RB        Referring Physician  TRESA Villatoro MD.  -RB        Patient Observations  alert;cooperative;agree to therapy  -RB        Patient/Family Observations  Multi family members in room.  -RB        General Observations of Patient  Supine in bed with IV and room air.  -RB        Prior Level of Function  independent:;all household mobility;gait;transfer;ADL's;driving;community mobility  -RB        Equipment Currently Used at Home  walker, rolling  -RB        Pertinent History of Current Functional Problem  Fall @ home with L femur fx, SP ORIF L femur with nailing/rodding.  -RB        Existing Precautions/Restrictions  weight bearing;partial weight bearing  (Significant)  TDWB on L LE, Habematolel  -RB        Limitations/Impairments  hearing  -RB        Equipment Issued to Patient  gait belt  -RB        Risks Reviewed  patient:;LOB  -RB        Benefits Reviewed  patient:;improve function;increase independence;increase strength;increase balance  -RB        Barriers to Rehab  none identified  -RB           Relationship/Environment    Primary Source of Support/Comfort  spouse  -RB        Lives With  spouse  -RB           Resource/Environmental Concerns    Current Living Arrangements  home/apartment/condo  -RB        Transportation Concerns  car, none  -RB           Home Main Entrance    Number of Stairs, Main Entrance  two  -RB        Stair Railings, Main Entrance  railing on right side (ascending)  -RB        Stairs Comment, Main Entrance  Pt has chair lift for going upstairs.  -RB           Cognitive Assessment/Intervention- PT/OT    Orientation Status (Cognition)  oriented x 4  -RB        Follows Commands (Cognition)  WNL  -RB           Mobility Assessment/Treatment    Extremity  Weight-bearing Status  left lower extremity  -RB        Left Lower Extremity (Weight-bearing Status)  touch down weight-bearing (TDWB)  (Significant)   -RB           Bed Mobility Assessment/Treatment    Bed Mobility Assessment/Treatment  sit-supine;supine-sit  -RB        Supine-Sit [place] (Bed Mobility)  moderate assist (50% patient effort)  -RB        Sit-Supine [place] (Bed Mobility)  minimum assist (75% patient effort);moderate assist (50% patient effort)  -RB        Bed Mobility, Safety Issues  decreased use of arms for pushing/pulling;decreased use of legs for bridging/pushing  -RB        Assistive Device (Bed Mobility)  bed rails;head of bed elevated  -RB           Functional Mobility    Functional Mobility- Ind. Level  minimum assist (75% patient effort)  -RB        Functional Mobility- Device  rolling walker  -RB        Functional Mobility-Distance (Feet)  8  -RB        Functional Mobility-Maintain WBing  able to maintain weight bearing status  -RB        Functional Mobility- Safety Issues  step length decreased;sequencing ability decreased  -RB           Transfer Assessment/Treatment    Transfer Assessment/Treatment  sit-stand transfer;stand-sit transfer;toilet transfer  -RB           Sit-Stand Transfer    Sit-Stand [place] (Transfers)  moderate assist (50% patient effort);minimum assist (75% patient effort)  -RB        Assistive Device (Sit-Stand Transfers)  walker, front-wheeled  -RB           Stand-Sit Transfer    Stand-Sit [place] (Transfers)  minimum assist (75% patient effort)  -RB        Assistive Device (Stand-Sit Transfers)  walker, front-wheeled  -RB           Toilet Transfer    Type (Toilet Transfer)  sit-stand;stand-sit  -RB        [place] Level (Toilet Transfer)  minimum assist (75% patient effort);moderate assist (50% patient effort)  -RB        Assistive Device (Toilet Transfer)  walker, front-wheeled  -RB           ADL Assessment/Intervention    BADL  Assessment/Intervention  lower body dressing;toileting  -RB           Lower Body Dressing Assessment/Training    Lower Body Dressing [place] Level  doff;don;socks;minimum assist (75% patient effort)  -RB        Assistive Devices (Lower Body Dressing)  one hand technique  -RB        Lower Body Dressing Position  edge of bed sitting  -RB           Toileting Assessment/Training    [place] Level (Toileting)  contact guard assist;minimum assist (75% patient effort);other (see comments) transfer only.  -RB        Assistive Devices (Toileting)  commode;grab bar/safety frame  -RB        Toileting Position  supported standing;unsupported sitting  -RB           BADL Safety/Performance    Impairments, BADL Safety/Performance  balance;endurance/activity tolerance;coordination;range of motion;strength  -RB           General ROM    GENERAL ROM COMMENTS  B UE AROM was WNLs.  -RB           MMT (Manual Muscle Testing)    General MMT Comments  L shld flex was 4/5 and rest of B UE strength was 4+/5.  -RB           Sensory Assessment/Intervention    Sensory General Assessment  no sensation deficits identified  -RB           Positioning and Restraints    Pre-Treatment Position  in bed  -RB        Post Treatment Position  bed  -RB        In Bed  supine;encouraged to call for assist;call light within reach;patient within staff view;exit alarm on  -RB           Pain Assessment    Additional Documentation  Pain Scale: Numbers Pre/Post-Treatment (Group)  -RB           Pain Scale: Numbers Pre/Post-Treatment    Pain Scale: Numbers, Pretreatment  3/10  -RB        Pain Scale: Numbers, Post-Treatment  2/10  -RB        Pain Location - Side  Left  -RB        Pain Location  hip  -RB           Wound 11/21/18 1538 Left hip incision;surgical    Wound - Properties Group Date first assessed: 11/21/18  -KM Time first assessed: 1538  -KM Present On Admission : no  -KM Side: Left  -KM Location: hip  -KM Type: incision;surgical  -KM       Plan of  Care Review    Plan of Care Reviewed With  patient;son  -RB           Clinical Impression (OT)    Date of Referral to OT  11/22/18  -RB        OT Diagnosis  Impaired mobility and ADLs.  -RB        Functional Level at Time of Evaluation (OT Eval)  Impaired mobility and ADLs.  -RB        Criteria for Skilled Therapeutic Interventions Met (OT Eval)  yes;treatment indicated  -RB        Rehab Potential (OT Eval)  good, to achieve stated therapy goals  -RB        Therapy Frequency (OT Eval)  other (see comments) 5-7 days/wk.  -RB        Predicted Duration of Therapy Intervention (Therapy Eval)  Until D/C.  -RB        Care Plan Review, Other Participant (OT Eval)  son  -RB        Anticipated Discharge Disposition (OT)  home with home health;home with assist  -RB           Vital Signs    Pre Systolic BP Rehab  143  -RB        Pre Treatment Diastolic BP  73  -RB        Post Systolic BP Rehab  161  -RB        Post Treatment Diastolic BP  74  -RB        Pretreatment Heart Rate (beats/min)  109  -RB        Posttreatment Heart Rate (beats/min)  110  -RB        Pre SpO2 (%)  92  -RB        O2 Delivery Pre Treatment  room air  -RB        Post SpO2 (%)  98  -RB        O2 Delivery Post Treatment  room air  -RB        Pre Patient Position  Supine  -RB        Intra Patient Position  Standing  -RB        Post Patient Position  Supine  -RB           Planned OT Interventions    Planned Therapy Interventions (OT Eval)  activity tolerance training;adaptive equipment training;BADL retraining;functional balance retraining;occupation/activity based interventions;patient/caregiver education/training;transfer/mobility retraining;strengthening exercise;ROM/therapeutic exercise  -RB           OT Goals    Bed Mobility Goal Selection (OT)  bed mobility, OT goal 1  -RB        Transfer Goal Selection (OT)  transfer, OT goal 1  -RB        Bathing Goal Selection (OT)  bathing, OT goal 1  -RB        Dressing Goal Selection (OT)  dressing, OT goal 1  -RB            Bed Mobility Goal 1 (OT)    Activity/Assistive Device (Bed Mobility Goal 1, OT)  bed mobility activities, all  -RB        [place] Level/Cues Needed (Bed Mobility Goal 1, OT)  contact guard assist  -RB        Time Frame (Bed Mobility Goal 1, OT)  long term goal (LTG);by discharge  -RB        Progress/Outcomes (Bed Mobility Goal 1, OT)  goal not met  -RB           Transfer Goal 1 (OT)    Activity/Assistive Device (Transfer Goal 1, OT)  transfers, all  -RB        [place] Level/Cues Needed (Transfer Goal 1, OT)  standby assist  -RB        Time Frame (Transfer Goal 1, OT)  long term goal (LTG);by discharge  -RB        Progress/Outcome (Transfer Goal 1, OT)  goal not met  -RB           Bathing Goal 1 (OT)    Activity/Assistive Device (Bathing Goal 1, OT)  bathing skills, all  -RB        [place] Level/Cues Needed (Bathing Goal 1, OT)  contact guard assist  -RB        Time Frame (Bathing Goal 1, OT)  long term goal (LTG);by discharge  -RB        Progress/Outcomes (Bathing Goal 1, OT)  goal not met  -RB           Dressing Goal 1 (OT)    Activity/Assistive Device (Dressing Goal 1, OT)  dressing skills, all  -RB        [place]/Cues Needed (Dressing Goal 1, OT)  contact guard assist  -RB        Time Frame (Dressing Goal 1, OT)  long term goal (LTG);by discharge  -RB        Progress/Outcome (Dressing Goal 1, OT)  goal not met  -RB           Patient Education Goal (OT)    Activity (Patient Education Goal, OT)  Independent with TDWB to L LE and home safety/fall prev.  -RB        [place]/Cues/Accuracy (Memory Goal 2, OT)  demonstrates adequately;verbalizes understanding  -RB        Time Frame (Patient Education Goal, OT)  long term goal (LTG);by discharge  -RB        Progress/Outcome (Patient Education Goal, OT)  goal not met  -RB           Living Environment    Home Accessibility  stairs to enter home  -RB          User Key  (r) = Recorded By, (t) = Taken By, (c) = Cosigned By    Initials Name  Effective Dates     Tayo Davis OT 06/15/16 -     Martha Fregoso RN 05/21/18 -          Occupational Therapy Education     Title: PT OT SLP Therapies (Active)     Topic: Occupational Therapy (Active)     Point: Precautions (Done)     Description: Instruct learner(s) on prescribed precautions during self-care and functional transfers.    Learning Progress Summary           Patient Acceptance, E, VU,NR by  at 11/22/2018 12:49 PM    Comment:  Edu on use of gait belt and non skid socks when OOB and no OOB without assist.                               User Key     Initials Effective Dates Name Provider Type Discipline     06/15/16 -  Tayo Davis OT Occupational Therapist OT                  OT Recommendation and Plan  Outcome Summary/Treatment Plan (OT)  Anticipated Discharge Disposition (OT): home with home health, home with assist  Planned Therapy Interventions (OT Eval): activity tolerance training, adaptive equipment training, BADL retraining, functional balance retraining, occupation/activity based interventions, patient/caregiver education/training, transfer/mobility retraining, strengthening exercise, ROM/therapeutic exercise  Therapy Frequency (OT Eval): other (see comments)(5-7 days/wk.)  Plan of Care Review  Plan of Care Reviewed With: patient  Plan of Care Reviewed With: patient  Outcome Summary: OT eval complete on this date.  Pt required min/mod A for sup to sit,  min A for sit to stand, 8' ambulation and toilet transfer.  Min A needed to doff sock.  Pt was independent prior with ADLs.  He could benefit from OT services to regain lost strength and function for ADLs and functional mobility.    Outcome Measures     Row Name 11/22/18 [facility] 11/22/18 0824          How much help from another person do you currently need...    Turning from your back to your side while in flat bed without using bedrails?  --  3  -BS     Moving from lying on back to sitting on the side of a flat bed without bedrails?   --  2  -BS     Moving to and from a bed to a chair (including a wheelchair)?  --  3  -BS     Standing up from a chair using your arms (e.g., wheelchair, bedside chair)?  --  3  -BS     Climbing 3-5 steps with a railing?  --  2  -BS     To walk in hospital room?  --  2  -BS     AM-PAC 6 Clicks Score  --  15  -BS        How much help from another is currently needed...    Putting on and taking off regular lower body clothing?  2  -RB  --     Bathing (including washing, rinsing, and drying)  2  -RB  --     Toileting (which includes using toilet bed pan or urinal)  2  -RB  --     Putting on and taking off regular upper body clothing  3  -RB  --     Taking care of personal grooming (such as brushing teeth)  4  -RB  --     Eating meals  4  -RB  --     Score  17  -RB  --        Functional Assessment    Outcome Measure Options  AM-PAC 6 Clicks Daily Activity (OT)  -RB  AM-PAC 6 Clicks Basic Mobility (PT)  -BS       User Key  (r) = Recorded By, (t) = Taken By, (c) = Cosigned By    Initials Name Provider Type    RB Tayo Davis OT Occupational Therapist    BS Tulio Duong, PT Physical Therapist          Time Calculation:   Time Calculation- OT     Row Name 11/22/18 1255             Time Calculation- OT    OT Start Time  1038  -RB      OT Stop Time  1104  -RB      OT Time Calculation (min)  26 min  -RB      OT Received On  11/22/18  -      OT Goal Re-Cert Due Date  12/05/18  -        User Key  (r) = Recorded By, (t) = Taken By, (c) = Cosigned By    Initials Name Provider Type    RB Tayo Davis OT Occupational Therapist        Therapy Suggested Charges     Code   Minutes Charges    None           Therapy Charges for Today     Code Description Service Date Service Provider Modifiers Qty    75533512281 HC OT SELFCARE CURRENT 11/22/2018 Tayo Davis OT GO, CK 1    [id number] HC OT SELFCARE PROJECTED 11/22/2018 Tayo Davis OT GO  1    04655133502 HC OT EVAL MOD COMPLEXITY 2 11/22/2018 Tayo Davis OT GO 1           OT G-codes  OT Professional Judgement Used?: Yes  OT Functional Scales Options: AM-PAC 6 Clicks Daily Activity (OT)  Score: 17  Functional Limitation: Self care  Self Care Current Status (): At least 40 percent but less than 60 percent impaired, limited or restricted  Self Care Goal Status (): At least 20 percent but less than 40 percent impaired, limited or restricted    Tayo Davis OT  11/22/2018

## 2018-11-22 NOTE — THERAPY EVALUATION
Acute Care - Physical Therapy Initial Evaluation  [facility]     Patient Name: Maciej Funk  : 1935  MRN: [id number]  Today's Date: 2018   Onset of Illness/Injury or Date of Surgery: 18  Date of Referral to PT: 18  Referring Physician: TRESA Villatoro MD (P).      Admit Date: 2018    Visit Dx:     ICD-10-CM ICD-9-CM   1. Closed displaced intertrochanteric fracture of left femur, initial encounter (CMS/[facility]) S72.142A 820.21   2. Mass of right lung R91.8 786.6   3. Closed displaced subtrochanteric fracture of left femur, initial encounter (CMS/[facility]) S72.22XA 820.22   4. Syncope, unspecified syncope type R55 780.2   5. Impaired functional mobility and activity tolerance Z74.09 V49.89     Patient Active Problem List   Diagnosis   • Hypertension   • History of adenomatous polyp of colon   • Hyperlipidemia   • Tobacco dependence syndrome   • Nicotine dependence, cigarettes, with other nicotine-induced disorders   • Carotid stenosis, bilateral   • Optic atrophy   • Nonexudative age-related macular degeneration   • Nuclear cataract   • Follow-up surgery care   • Closed displaced intertrochanteric fracture of left femur (CMS/HCC)   • Pulmonary hypertension (CMS/HCC)   • Emphysema of lung (CMS/HCC)   • Atherosclerosis     Past Medical History:   Diagnosis Date   • Abdominal aortic aneurysm (CMS/HCC)    • Carotid stenosis, bilateral    • Cataract    • Emphysema of lung (CMS/HCC)    • Herpes zoster    • Hyperlipidemia    • Hypertension      Past Surgical History:   Procedure Laterality Date   • CARDIAC CATHETERIZATION     • COLONOSCOPY     • COLONOSCOPY W/ POLYPECTOMY     • HAND SURGERY          PT ASSESSMENT (last 12 hours)      Physical Therapy Evaluation     Row Name 18 0824          PT Evaluation Time/Intention    Subjective Information  complains of;pain  -BS     Document Type  evaluation  -BS     Mode of Treatment  individual therapy;physical therapy  -BS     Patient Effort   good  -BS     Row Name 11/22/18 0824          General Information    Patient Profile Reviewed?  yes  -BS     Onset of Illness/Injury or Date of Surgery  11/21/18  -BS     Referring Physician  Dr. MARYJANE Hare  -BS     Patient Observations  alert;cooperative;agree to therapy  -BS     Patient/Family Observations  pt's stepson at bedside  -BS     Prior Level of Function  independent:;ADL's;community mobility;driving no AD for ambulation, did yardwork outside  -BS     Pertinent History of Current Functional Problem  82 yo male with a mechanical fall and subtrochanteric left femur fx, now s/p ORIF left femur 11/21/18  -BS     Existing Precautions/Restrictions  weight bearing TDWB on L LE, [place]  -BS     Limitations/Impairments  hearing  -BS     Equipment Issued to Patient  gait belt  -BS     Risks Reviewed  patient and family:;increased discomfort;change in vital signs;LOB  -BS     Benefits Reviewed  patient and family:;improve function;increase independence;increase strength;increase balance;decrease pain;decrease risk of DVT;increase knowledge  -BS     Row Name 11/22/18 0824          Relationship/Environment    Primary Source of Support/Comfort  spouse;child(durga)  -BS     Lives With  spouse  -BS     Row Name 11/22/18 0824          Resource/Environmental Concerns    Current Living Arrangements  home/apartment/condo SS home w/ lower basement  -BS     Row Name 11/22/18 0824          Home Main Entrance    Number of Stairs, Main Entrance  two  -BS     Stair Railings, Main Entrance  railings on both sides of stairs  -BS     Stairs Comment, Main Entrance  garage entrance to home  -BS     Row Name 11/22/18 0824          Cognitive Assessment/Intervention- PT/OT    Orientation Status (Cognition)  oriented x 4  -BS     Follows Commands (Cognition)  WNL  -BS     Row Name 11/22/18 0824          Bed Mobility Assessment/Treatment    Bed Mobility Assessment/Treatment  sit-supine;supine-sit  -BS     Supine-Sit [place] (Bed Mobility)   moderate assist (50% patient effort);1 person assist;verbal cues  -BS     Sit-Supine [place] (Bed Mobility)  moderate assist (50% patient effort);verbal cues;1 person assist  -BS     Bed Mobility, Safety Issues  decreased use of arms for pushing/pulling;decreased use of legs for bridging/pushing  -BS     Assistive Device (Bed Mobility)  bed rails  -BS     Row Name 11/22/18 0824          Transfer Assessment/Treatment    Transfer Assessment/Treatment  sit-stand transfer  -BS     Maintains Weight-bearing Status (Transfers)  verbal cues to maintain;nonverbal cues (demo/gesture) to maintain  -BS     Sit-Stand [place] (Transfers)  moderate assist (50% patient effort);1 person assist;verbal cues  -     Row Name 11/22/18 0824          Sit-Stand Transfer    Assistive Device (Sit-Stand Transfers)  walker, front-wheeled  -     Row Name 11/22/18 0824          Gait/Stairs Assessment/Training    [place] Level (Gait)  not tested limited by pain and wt bearing restrictions  -BS     Comment (Gait/Stairs)  stood only, 2 minutes x 2 reps with use of RW and multiple vc's to maintain TDWB on L LE effectively  -BS     Row Name 11/22/18 0824          General ROM    GENERAL ROM COMMENTS  AROM: left hip-flex ~30° abd-severely limited R LE-grossly WFL  -BS     Row Name 11/22/18 0824          MMT (Manual Muscle Testing)    General MMT Comments  R LE-3+/5 knee flex/ext 4/5; L LE-hip flex 2/5 hip abd/add 2-/5 knee flex 2+/5 knee ext 3-/5  -     Row Name 11/22/18 0824          Therapeutic Exercise    Comment (Therapeutic Exercise)  supine L heel slides x 10, seated heel/toe raises x 10 ea, AAROM L hip flex in supine x 10  -BS     Row Name 11/22/18 0824          Sensory Assessment/Intervention    Sensory General Assessment  no sensation deficits identified  -     Row Name 11/22/18 0824          Pain Assessment    Additional Documentation  Pain Scale: Numbers Pre/Post-Treatment (Group)  -     Row Name 11/22/18 0824           Pain Scale: Numbers Pre/Post-Treatment    Pain Scale: Numbers, Pretreatment  2/10  -BS     Pain Scale: Numbers, Post-Treatment  8/10  -BS     Pain Location - Side  Left  -BS     Pain Location - Orientation  posterior  -BS     Pain Location  hip  -BS     Pain Intervention(s)  Medication (See MAR)  -BS     Row Name             Wound 11/21/18 1538 Left hip incision;surgical    Wound - Properties Group Date first assessed: 11/21/18  -KM Time first assessed: 1538  -KM Present On Admission : no  -KM Side: Left  -KM Location: hip  -KM Type: incision;surgical  -KM    Row Name 11/22/18 0824          Physical Therapy Clinical Impression    Date of Referral to PT  11/22/18  -BS     PT Diagnosis (PT Clinical Impression)  impaired functional mobility due to left hip pain  -BS     Criteria for Skilled Interventions Met (PT Clinical Impression)  yes;treatment indicated  -BS     Pathology/Pathophysiology Noted (Describe Specifically for Each System)  musculoskeletal  -BS     Impairments Found (describe specific impairments)  gait, locomotion, and balance;joint integrity and mobility;muscle performance;ROM  -BS     Rehab Potential (PT Clinical Summary)  good, to achieve stated therapy goals  -BS     Predicted Duration of Therapy (PT)  until goals are met  -BS     Care Plan Review (PT)  evaluation/treatment results reviewed;care plan/treatment goals reviewed  -BS     Row Name 11/22/18 0824          Vital Signs    Pre Systolic BP Rehab  183  -BS     Pre Treatment Diastolic BP  79  -BS     Post Systolic BP Rehab  194  -BS     Post Treatment Diastolic BP  91  -BS     Pretreatment Heart Rate (beats/min)  105  -BS     Posttreatment Heart Rate (beats/min)  108  -BS     Pre SpO2 (%)  93  -BS     O2 Delivery Pre Treatment  room air  -BS     Post SpO2 (%)  95  -BS     O2 Delivery Post Treatment  room air  -BS     Pre Patient Position  Supine  -BS     Intra Patient Position  Sitting  -BS     Post Patient Position  Supine  -BS     Row Name  11/22/18 0824          Physical Therapy Goals    Bed Mobility Goal Selection (PT)  bed mobility, PT goal 1  -BS     Transfer Goal Selection (PT)  transfer, PT goal 1  -BS     Gait Training Goal Selection (PT)  gait training, PT goal 1  -BS     Additional Documentation  Stairs Goal Selection (PT) (Row)  -BS     Row Name 11/22/18 0824          Bed Mobility Goal 1 (PT)    Activity/Assistive Device (Bed Mobility Goal 1, PT)  sit to supine/supine to sit  -BS     [place] Level/Cues Needed (Bed Mobility Goal 1, PT)  standby assist  -BS     Time Frame (Bed Mobility Goal 1, PT)  by discharge  -BS     Barriers (Bed Mobility Goal 1, PT)  pain  -BS     Progress/Outcomes (Bed Mobility Goal 1, PT)  goal not met  -BS     Row Name 11/22/18 0824          Transfer Goal 1 (PT)    Activity/Assistive Device (Transfer Goal 1, PT)  sit-to-stand/stand-to-sit  -BS     [place] Level/Cues Needed (Transfer Goal 1, PT)  standby assist  -BS     Time Frame (Transfer Goal 1, PT)  by discharge  -BS     Barriers (Transfers Goal 1, PT)  pain, wt bearing restrictions  -BS     Progress/Outcome (Transfer Goal 1, PT)  goal not met  -BS     Row Name 11/22/18 0824          Gait Training Goal 1 (PT)    Activity/Assistive Device (Gait Training Goal 1, PT)  walker, rolling  -BS     [place] Level (Gait Training Goal 1, PT)  standby assist  -BS     Distance (Gait Goal 1, PT)  10 ft x 1 maintaining TDWB on L LE consistently  -BS     Time Frame (Gait Training Goal 1, PT)  by discharge  -BS     Barriers (Gait Training Goal 1, PT)  pain, fatigue, impaired standing balance  -BS     Progress/Outcome (Gait Training Goal 1, PT)  goal not met  -BS     Row Name 11/22/18 0824          Stairs Goal 1 (PT)    Activity/Assistive Device (Stairs Goal 1, PT)  step-to-step;ascending stairs;descending stairs  -BS     [place] Level/Cues Needed (Stairs Goal 1, PT)  contact guard assist  -BS     Number of Stairs (Stairs Goal 1, PT)  2  -BS     Time Frame (Stairs  Goal 1, PT)  by discharge  -BS     Barriers (Stairs Goal 1, PT)  TDWB on L LE, pain  -BS     Progress/Outcome (Stairs Goal 1, PT)  goal not met  -BS     Row Name 11/22/18 0824          Positioning and Restraints    Pre-Treatment Position  in bed  -BS     Post Treatment Position  bed  -BS     In Bed  supine;with family/caregiver;call light within reach;notified nsg  -BS     Row Name 11/22/18 0824          Living Environment    Home Accessibility  stairs within home;stairs to enter home has chair lift connecting basement to main level  -BS       User Key  (r) = Recorded By, (t) = Taken By, (c) = Cosigned By    Initials Name Provider Type    BS Tulio Duong, PT Physical Therapist    Martha Fregoso, RN Registered Nurse        Physical Therapy Education     Title: PT OT SLP Therapies (Active)     Topic: Physical Therapy (Active)     Point: Mobility training (Active)     Learning Progress Summary           Patient Acceptance, D, NR by BS at 11/22/2018 12:00 PM    Comment:  vc's and tc's for correctly executing TDWB on L LE upon assuming standing, as well as cues for scooting self to the HOB.                   Point: Home exercise program (Active)     Learning Progress Summary           Patient Acceptance, D, NR by BS at 11/22/2018 12:00 PM    Comment:  vc's and tc's for correctly executing TDWB on L LE upon assuming standing, as well as cues for scooting self to the HOB.                   Point: Body mechanics (Active)     Learning Progress Summary           Patient Acceptance, D, NR by BS at 11/22/2018 12:00 PM    Comment:  vc's and tc's for correctly executing TDWB on L LE upon assuming standing, as well as cues for scooting self to the HOB.                   Point: Precautions (Active)     Learning Progress Summary           Patient Acceptance, D, NR by BS at 11/22/2018 12:00 PM    Comment:  vc's and tc's for correctly executing TDWB on L LE upon assuming standing, as well as cues for scooting self to the HOB.                                User Key     Initials Effective Dates Name Provider Type Discipline    BS 04/08/18 -  Tulio Duong, PT Physical Therapist PT              PT Recommendation and Plan  Anticipated Discharge Disposition (PT): home with assist, home with home health  Planned Therapy Interventions (PT Eval): balance training, bed mobility training, gait training, home exercise program, manual therapy techniques, neuromuscular re-education, patient/family education, ROM (range of motion), stair training, strengthening, stretching, transfer training  Therapy Frequency (PT Clinical Impression): 2 times/day  Outcome Summary/Treatment Plan (PT)  Anticipated Discharge Disposition (PT): home with assist, home with home health  Plan of Care Reviewed With: patient(art)  Outcome Summary: PT evaluation completed. Pt limited by severe left hip pain with movement s/p L hip ORIF. Would benefit from skilled PT to address pain, ROM, strength as well as mobility deficits to facilitate return to PLOF. Has DME at home. Recommend discharge (when ready) home with wife and home health PT upon d/c.   Outcome Measures     Row Name 11/22/18 0824             How much help from another person do you currently need...    Turning from your back to your side while in flat bed without using bedrails?  3  -BS      Moving from lying on back to sitting on the side of a flat bed without bedrails?  2  -BS      Moving to and from a bed to a chair (including a wheelchair)?  3  -BS      Standing up from a chair using your arms (e.g., wheelchair, bedside chair)?  3  -BS      Climbing 3-5 steps with a railing?  2  -BS      To walk in hospital room?  2  -BS      AM-PAC 6 Clicks Score  15  -BS         Functional Assessment    Outcome Measure Options  AM-PAC 6 Clicks Basic Mobility (PT)  -BS        User Key  (r) = Recorded By, (t) = Taken By, (c) = Cosigned By    Initials Name Provider Type    Tulio Sadler PT Physical Therapist         Time  Calculation:   PT Charges     Row Name 11/22/18 1202             Time Calculation    Start Time  0824  -BS      Stop Time  0917  -BS      Time Calculation (min)  53 min  -BS      PT Received On  11/22/18  -BS      PT Goal Re-Cert Due Date  12/05/18  -BS         Timed Charges    70706 - PT Therapeutic Exercise Minutes  15  -BS        User Key  (r) = Recorded By, (t) = Taken By, (c) = Cosigned By    Initials Name Provider Type    BS Tulio Duong, PT Physical Therapist        Therapy Suggested Charges     Code   Minutes Charges    51151 (CPT®) Hc Pt Neuromusc Re Education Ea 15 Min      23350 (CPT®) Hc Pt Ther Proc Ea 15 Min 15 1    83083 (CPT®) Hc Gait Training Ea 15 Min      64261 (CPT®) Hc Pt Therapeutic Act Ea 15 Min      62307 (CPT®) Hc Pt Manual Therapy Ea 15 Min      76043 (CPT®) Hc Pt Iontophoresis Ea 15 Min      59709 (CPT®) Hc Pt Elec Stim Ea-Per 15 Min      96484 (CPT®) Hc Pt Ultrasound Ea 15 Min      14400 (CPT®) Hc Pt Self Care/Mgmt/Train Ea 15 Min      39975 (CPT®) Hc Pt Prosthetic (S) Train Initial Encounter, Each 15 Min      13292 (CPT®) Hc Pt Orthotic(S)/Prosthetic(S) Encounter, Each 15 Min      35526 (CPT®) Hc Orthotic(S) Mgmt/Train Initial Encounter, Each 15min      Total  15 1        Therapy Charges for Today     Code Description Service Date Service Provider Modifiers Qty    41009162787 HC PT MOBILITY CURRENT 11/22/2018 Tulio Duong, PT GP,  1    68666821690 HC PT MOBILITY PROJECTED 11/22/2018 Tulio Duong, PT GP,  1    64343035601 HC PT EVAL MOD COMPLEXITY 3 11/22/2018 Tulio Duong, PT GP 1    76591769095 HC PT THER PROC EA 15 MIN 11/22/2018 Tulio Duong, PT GP 1          PT [place]  Outcome Measure Options: AM-PAC 6 Clicks Basic Mobility (PT)  AM-PAC 6 Clicks Score: 15  Functional Limitation: Mobility: Walking and moving around  Mobility: Walking and Moving Around Current Status (): At least 40 percent but less than 60 percent impaired, limited or restricted  Mobility: Walking  and Moving Around Goal Status (): At least 20 percent but less than 40 percent impaired, limited or restricted      Tulio Duong, PT  11/22/2018

## 2018-11-22 NOTE — PLAN OF CARE
Problem: Fall Risk (Adult)  Goal: Absence of Fall  Outcome: Ongoing (interventions implemented as appropriate)      Problem: Patient Care Overview  Goal: Plan of Care Review  Outcome: Ongoing (interventions implemented as appropriate)   11/22/18 1419   Coping/Psychosocial   Plan of Care Reviewed With patient   Plan of Care Review   Progress no change   OTHER   Outcome Summary Vs stable; pt worked with therapy today; mckeon d/c; pain control     Goal: Individualization and Mutuality  Outcome: Ongoing (interventions implemented as appropriate)    Goal: Discharge Needs Assessment  Outcome: Ongoing (interventions implemented as appropriate)    Goal: Interprofessional Rounds/Family Conf  Outcome: Ongoing (interventions implemented as appropriate)      Problem: Surgery Nonspecified (Adult)  Goal: Signs and Symptoms of Listed Potential Problems Will be Absent, Minimized or Managed (Surgery Nonspecified)  Outcome: Ongoing (interventions implemented as appropriate)    Goal: Anesthesia/Sedation Recovery  Outcome: Outcome(s) achieved Date Met: 11/22/18

## 2018-11-22 NOTE — PLAN OF CARE
Problem: Fall Risk (Adult)  Goal: Absence of Fall  Outcome: Ongoing (interventions implemented as appropriate)   11/22/18 0536   Fall Risk (Adult)   Absence of Fall making progress toward outcome       Problem: Fracture Orthopaedic (Adult)  Goal: Signs and Symptoms of Listed Potential Problems Will be Absent, Minimized or Managed (Fracture Orthopaedic)  Outcome: Ongoing (interventions implemented as appropriate)   11/21/18 1346   Goal/Outcome Evaluation   Problems Assessed (Orthopaedic Fracture) all   Problems Present (Orthopaedic Fracture) functional deficit/self-care deficit;situational response       Problem: Patient Care Overview  Goal: Plan of Care Review  Outcome: Ongoing (interventions implemented as appropriate)   11/22/18 0536   Coping/Psychosocial   Plan of Care Reviewed With patient   Plan of Care Review   Progress no change       Problem: Skin Injury Risk (Adult)  Goal: Identify Related Risk Factors and Signs and Symptoms  Outcome: Ongoing (interventions implemented as appropriate)   11/21/18 8706   Skin Injury Risk (Adult)   Related Risk Factors (Skin Injury Risk) advanced age;mobility impaired     Goal: Skin Health and Integrity  Outcome: Ongoing (interventions implemented as appropriate)   11/22/18 0524   Skin Injury Risk (Adult)   Skin Health and Integrity making progress toward outcome

## 2018-11-22 NOTE — PLAN OF CARE
Problem: Fracture Orthopaedic (Adult)  Goal: Signs and Symptoms of Listed Potential Problems Will be Absent, Minimized or Managed (Fracture Orthopaedic)  Outcome: Outcome(s) achieved Date Met: 11/22/18

## 2018-11-22 NOTE — PROGRESS NOTES
[facility]  INPATIENT PROGRESS NOTE    Length of Stay: 2  Date of Admission: 11/20/2018  Primary Care Physician: Debbi Jackson MD    Subjective   Chief Complaint: No complaints    11/22/2018:  The patient has no complaints today.  He is working well with PT/OT.  Yohannes removed this am.  Monitor output.  Echocardiogram today shows EF decreased to 36-40%. The patient reports no further dizziness.  Patient reports pain well-controlled.     Previous notes by GEOVANNY Quezada PA:      HPI:  This 83-year-old  male reported to the emergency department secondary to a fall at home.  Fall resulted in injury of the left hip, a minimally displaced intertrochanteric fracture.  Patient scheduled for surgical repair with Dr. Hare today.  Patient did fall secondary to dizziness.  It is unclear whether or not he lost consciousness as he denies loss of consciousness, however his wife reports that she found him unconscious.  Patient currently has no chest pain or shortness of air, no dizziness, no syncope, no palpitations.     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 Ervin, this is unchanged since previous ultrasounds.  Dr. Ervin discussed the case with anesthesiology and determined the patient was stable and cleared for surgery.     As emergency department provider was concerned that there could be a AAA, CT angiogram of the aorta was performed.  There was no aortic aneurysm found on film.     Echocardiogram performed in 2016 demonstrated ejection fraction of 50-55% without regional abnormalities.  There was grade 1 diastolic dysfunction, without significant valvular dysfunction.     EKG did demonstrate a left bundle branch block, however patient has demonstrated this left bundle-branch block on previous EKGs, most recently available in January 2017.     Patient reports no seizure activity, no  loss of bowel or bladder function, no focal weakness, no slurring of speech or drooping of face.  CT of the head demonstrates no acute findings.     83- year-old male presented to the ER on 11/20 due to falling at home and injuring his left hip. He admits to falling due to dizziness, but does not believe he lost consciousness. He was awaiting fixation of the left femur fracture this morning (11/21/18). Cardiology was consulted and cleared him for the surgery. The patient denies any current dizziness, headaches, shortness of breath, chest pain, or abdominal pain. The patient admits to a 2ppd history of smoking.        Review of Systems   Constitutional: Positive for activity change. Negative for fatigue.   HENT: Negative for ear pain and sore throat.    Eyes: Negative for pain and discharge.   Respiratory: Negative for cough and shortness of breath.    Cardiovascular: Negative for chest pain and palpitations.   Gastrointestinal: Negative for abdominal pain and nausea.   Endocrine: Negative for cold intolerance and heat intolerance.   Genitourinary: Negative for difficulty urinating and dysuria.   Musculoskeletal: Negative for arthralgias and gait problem.   Skin: Negative for color change and rash.   Neurological: Negative for dizziness and weakness.   Psychiatric/Behavioral: Negative for agitation and confusion.        Objective    Temp:  [96.4 °F (35.8 °C)-98.9 °F (37.2 °C)] 97.7 °F (36.5 °C)  Heart Rate:  [] 108  Resp:  [16-20] 18  BP: (104-149)/(54-72) 138/63    Physical Exam   Constitutional: He is oriented to person, place, and time. He appears well-developed and well-nourished.   HENT:   Head: Normocephalic and atraumatic.   Eyes: EOM are normal. Pupils are equal, round, and reactive to light.   Neck: Normal range of motion. Neck supple.   Cardiovascular: Normal rate and regular rhythm.   Pulmonary/Chest: Effort normal and breath sounds normal.   Abdominal: Soft. Bowel sounds are normal.    Musculoskeletal: Normal range of motion.   Neurological: He is alert and oriented to person, place, and time.   Skin: Skin is warm and dry.   Psychiatric: He has a normal mood and affect. His behavior is normal.     Results Review:  I have reviewed the labs, radiology results, and diagnostic studies.    Laboratory Data:   Results from last 7 days   Lab Units  11/22/18   0556  11/21/18   0600  11/20/18   1048   SODIUM mmol/L  135*  135*  136*   POTASSIUM mmol/L  4.4  4.4  3.8   CHLORIDE mmol/L  102  105  102   CO2 mmol/L  26.0  25.0  26.0   BUN mg/dL  18  20  20   CREATININE mg/dL  0.97  0.94  1.20   GLUCOSE mg/dL  130*  94  132*   CALCIUM mg/dL  7.9*  8.2*  8.7   BILIRUBIN mg/dL  1.1   --   1.1   ALK PHOS U/L  50   --   68   ALT (SGPT) U/L  18*   --   18*   AST (SGOT) U/L  28   --   19   ANION GAP mmol/L  7.0  5.0  8.0     Estimated Creatinine Clearance: 67.2 mL/min (by C-G formula based on SCr of 0.97 mg/dL).  Results from last 7 days   Lab Units  11/20/18   1048   MAGNESIUM mg/dL  2.0         Results from last 7 days   Lab Units  11/22/18   0556  11/21/18   0600  11/20/18   1048   WBC 10*3/mm3  8.72  7.81  17.16*   HEMOGLOBIN g/dL  10.2*  11.1*  12.7*   HEMATOCRIT %  29.3*  31.8*  36.6*   PLATELETS 10*3/mm3  155  187  227     Results from last 7 days   Lab Units  11/20/18   1048   INR   1.10       Culture Data:   No results found for: BLOODCX  Urine Culture   Date Value Ref Range Status   11/20/2018 No growth at 24 hours  Final     No results found for: RESPCX  No results found for: WOUNDCX  No results found for: STOOLCX  No components found for: BODYFLD    Radiology Data:   Imaging Results (last 24 hours)     Procedure Component Value Units Date/Time    FL C Arm During Surgery [[id number]] Updated:  11/21/18 3818          I have reviewed the patient's current medications.     Assessment/Plan     Active Hospital Problems    Diagnosis   • **Closed displaced intertrochanteric fracture of left femur (CMS/HCC)   •  Atherosclerosis   • Emphysema of lung (CMS/HCC)   • Hypertension       Plan:    1.  Closed displaced intertrochanteric fracture, left femur:  POD 2.  Continue PT/OT and pain control.    2.  HFrEF: Will set the patient up with cardiology as an outpatient for reduced EF. BNP in am.       Discharge Planning: I expect patient to be discharged to home in 1-2 days.      This document has been electronically signed by CHRISTINA Mi on November 22, 2018 11:58 AM

## 2018-11-22 NOTE — THERAPY TREATMENT NOTE
Acute Care - Physical Therapy Treatment Note  [facility]     Patient Name: Maciej Funk  : 1935  MRN: [id number]  Today's Date: 2018  Onset of Illness/Injury or Date of Surgery: 18  Date of Referral to PT: 18  Referring Physician: TRESA Villatoro MD.    Admit Date: 2018    Visit Dx:    ICD-10-CM ICD-9-CM   1. Closed displaced intertrochanteric fracture of left femur, initial encounter (CMS/[facility]) S72.142A 820.21   2. Mass of right lung R91.8 786.6   3. Closed displaced subtrochanteric fracture of left femur, initial encounter (CMS/HCC) S72.22XA 820.22   4. Syncope, unspecified syncope type R55 780.2   5. Impaired functional mobility and activity tolerance Z74.09 V49.89   6. Impaired mobility and activities of daily living Z74.09 799.89     Patient Active Problem List   Diagnosis   • Hypertension   • History of adenomatous polyp of colon   • Hyperlipidemia   • Tobacco dependence syndrome   • Nicotine dependence, cigarettes, with other nicotine-induced disorders   • Carotid stenosis, bilateral   • Optic atrophy   • Nonexudative age-related macular degeneration   • Nuclear cataract   • Follow-up surgery care   • Closed displaced intertrochanteric fracture of left femur (CMS/[facility])   • Pulmonary hypertension (CMS/[facility])   • Emphysema of lung (CMS/[facility])   • Atherosclerosis       Therapy Treatment    Rehabilitation Treatment Summary     Row Name 18 1447             Treatment Time/Intention    Discipline  physical therapy assistant  -RW      Document Type  therapy note (daily note)  -RW      Mode of Treatment  individual therapy;physical therapy  -RW      Total Minutes, Physical Therapy Treatment  28  -RW      Therapy Frequency (PT Clinical Impression)  2 times/day  -RW      Patient Effort  good  -RW      Existing Precautions/Restrictions  weight bearing TDWB on L LE, [place]  -RW      Recorded by [RW] Sanjay Bourne, PTA 18 6412      Row Name 18 1447             Vital  Signs    Pre Systolic BP Rehab  155  -RW      Pre Treatment Diastolic BP  81  -RW      Post Systolic BP Rehab  148  -RW      Post Treatment Diastolic BP  72  -RW      Pretreatment Heart Rate (beats/min)  115  -RW      Intratreatment Heart Rate (beats/min)  126  -RW      Posttreatment Heart Rate (beats/min)  115  -RW      Pre SpO2 (%)  95  -RW      O2 Delivery Pre Treatment  room air  -RW      Recorded by [RW] Sanjay Bourne, PTA 11/22/18 1525      Row Name 11/22/18 1447             Cognitive Assessment/Intervention- PT/OT    Orientation Status (Cognition)  oriented x 4  -RW      Follows Commands (Cognition)  WNL  -RW      Recorded by [RW] Sanjay Bourne, PTA 11/22/18 1525      Row Name 11/22/18 1447             Bed Mobility Assessment/Treatment    Bed Mobility Assessment/Treatment  sit-supine;supine-sit  -RW      Supine-Sit [place] (Bed Mobility)  1 person assist;minimum assist (75% patient effort)  -RW      Assistive Device (Bed Mobility)  bed rails;head of bed elevated  -RW      Recorded by [RW] Sanjay Bourne, PTA 11/22/18 1525      Row Name 11/22/18 1447             Functional Mobility    Functional Mobility- Ind. Level  minimum assist (75% patient effort)  -RW      Functional Mobility- Device  rolling walker  -RW      Functional Mobility-Distance (Feet)  3 to bedside chair  -RW      Recorded by [RW] Sanjay Bourne, PTA 11/22/18 1525      Row Name 11/22/18 1447             Transfer Assessment/Treatment    Transfer Assessment/Treatment  sit-stand transfer  -RW      Maintains Weight-bearing Status (Transfers)  able to maintain;verbal cues to maintain  -RW      Recorded by [RW] Sanjay Bourne, PTA 11/22/18 1525      Row Name 11/22/18 1447             Sit-Stand Transfer    Sit-Stand [place] (Transfers)  1 person assist;minimum assist (75% patient effort)  -RW      Assistive Device (Sit-Stand Transfers)  walker, front-wheeled  -RW      Recorded by [RW] Sanjay Bourne, PTA 11/22/18 1525      Row Name  11/22/18 1447             Stand-Sit Transfer    Stand-Sit [place] (Transfers)  minimum assist (75% patient effort)  -RW      Assistive Device (Stand-Sit Transfers)  walker, front-wheeled  -RW      Recorded by [RW] Sanjay Bourne, [facility] 11/22/18 1525      Row Name 11/22/18 1447             Gait/Stairs Assessment/Training    [place] Level (Gait)  minimum assist (75% patient effort)  -RW      Assistive Device (Gait)  walker, front-wheeled  -RW      Distance in Feet (Gait)  to bedside chair  -RW      Recorded by [RW] Sanjay Bourne, [facility] 11/22/18 1525      Row Name 11/22/18 1447             Therapeutic Exercise    Lower Extremity (Therapeutic Exercise)  gluteal sets;LAQ (long arc quad), bilateral  -RW      Lower Extremity Range of Motion (Therapeutic Exercise)  ankle dorsiflexion/plantar flexion, bilateral  -RW      Position (Therapeutic Exercise)  supine;seated  -RW      Sets/Reps (Therapeutic Exercise)  2/5-15  -RW      Recorded by [RW] Sanjay Bourne, [facility] 11/22/18 1525      Row Name 11/22/18 1447             Positioning and Restraints    Pre-Treatment Position  in bed  -RW      Post Treatment Position  chair  -RW      In Chair  call light within reach;encouraged to call for assist;legs elevated;reclined  -RW      Recorded by [RW] Sanjay Bourne, [facility] 11/22/18 1525      Row Name 11/22/18 1447             Pain Scale: Numbers Pre/Post-Treatment    Pain Scale: Numbers, Pretreatment  4/10  -RW      Pain Scale: Numbers, Post-Treatment  4/10  -RW      Pain Location - Side  Left  -RW      Pain Location - Orientation  posterior  -RW      Pain Location  hip  -RW      Recorded by [RW] Sanjay Bourne, [facility] 11/22/18 1525      Row Name 11/22/18 1447             Sensory Assessment/Intervention    Sensory General Assessment  --  -RW      Recorded by [RW] Sanjay Bourne, [facility] 11/22/18 1525      Row Name                Wound 11/21/18 1538 Left hip incision;surgical    Wound - [employer] Date first assessed: 11/21/18  [KM] Time first assessed: 1538 [KM] Present On Admission : no [KM] Side: Left [KM] Location: hip [KM] Type: incision;surgical [KM] Recorded by:  [KM] Martha Juan, RN 11/21/18 1538    Row Name 11/22/18 1447             Outcome Summary/Treatment Plan (PT)    Daily Summary of Progress (PT)  progress toward functional goals as expected  -RW      Plan for Continued Treatment (PT)  gt and mobiity  -RW      Anticipated Discharge Disposition (PT)  home with assist;home with home health;inpatient rehabilitation facility  -RW      Recorded by [RW] Sanjay Bourne, PTA 11/22/18 1525        User Key  (r) = Recorded By, (t) = Taken By, (c) = Cosigned By    Initials Name Effective Dates Discipline    RW Sanjay Bourne, PTA 03/07/18 -  PT    KM Martha Juan RN 05/21/18 -  Nurse          Wound 11/21/18 1538 Left hip incision;surgical (Active)   Dressing Appearance dry;intact 11/22/2018  8:43 AM   Closure RAQUEL 11/22/2018  8:43 AM   Drainage Amount none 11/22/2018  8:43 AM   Care, Wound cleansed with;sterile normal saline 11/21/2018  3:49 PM   Dressing Care, Wound gauze 11/21/2018  3:53 PM       PT Rehab Goals     Row Name 11/22/18 1525 11/22/18 0824          Bed Mobility Goal 1 (PT)    Activity/Assistive Device (Bed Mobility Goal 1, PT)  sit to supine/supine to sit  -RW  sit to supine/supine to sit  -BS     [place] Level/Cues Needed (Bed Mobility Goal 1, PT)  standby assist  -RW  standby assist  -BS     Time Frame (Bed Mobility Goal 1, PT)  by discharge  -RW  by discharge  -BS     Barriers (Bed Mobility Goal 1, PT)  pain  -RW  pain  -BS     Progress/Outcomes (Bed Mobility Goal 1, PT)  goal not met  -RW  goal not met  -BS        Transfer Goal 1 (PT)    Activity/Assistive Device (Transfer Goal 1, PT)  sit-to-stand/stand-to-sit  -RW  sit-to-stand/stand-to-sit  -BS     [place] Level/Cues Needed (Transfer Goal 1, PT)  standby assist  -RW  standby assist  -BS     Time Frame (Transfer Goal 1, PT)  by discharge  -RW  by  discharge  -BS     Barriers (Transfers Goal 1, PT)  pain, wt bearing restrictions  -RW  pain, wt bearing restrictions  -BS     Progress/Outcome (Transfer Goal 1, PT)  goal not met  -RW  goal not met  -BS        Gait Training Goal 1 (PT)    Activity/Assistive Device (Gait Training Goal 1, PT)  walker, rolling  -RW  walker, rolling  -BS     [place] Level (Gait Training Goal 1, PT)  standby assist  -RW  standby assist  -BS     Distance (Gait Goal 1, PT)  10 ft x 1 maintaining TDWB on L LE consistently  -RW  10 ft x 1 maintaining TDWB on L LE consistently  -BS     Time Frame (Gait Training Goal 1, PT)  by discharge  -RW  by discharge  -BS     Barriers (Gait Training Goal 1, PT)  pain, fatigue, impaired standing balance  -RW  pain, fatigue, impaired standing balance  -BS     Progress/Outcome (Gait Training Goal 1, PT)  goal not met  -RW  goal not met  -BS        Stairs Goal 1 (PT)    Activity/Assistive Device (Stairs Goal 1, PT)  step-to-step;ascending stairs;descending stairs  -RW  step-to-step;ascending stairs;descending stairs  -BS     [place] Level/Cues Needed (Stairs Goal 1, PT)  contact guard assist  -RW  contact guard assist  -BS     Number of Stairs (Stairs Goal 1, PT)  2  -RW  2  -BS     Time Frame (Stairs Goal 1, PT)  by discharge  -RW  by discharge  -BS     Barriers (Stairs Goal 1, PT)  TDWB on L LE, pain  -RW  TDWB on L LE, pain  -BS     Progress/Outcome (Stairs Goal 1, PT)  goal not met  -RW  goal not met  -BS       User Key  (r) = Recorded By, (t) = Taken By, (c) = Cosigned By    Initials Name Provider Type Discipline    RW Sanjay Bourne, PTA Physical Therapy Assistant PT    Tulio Sadler PT Physical Therapist PT          Physical Therapy Education     Title: PT OT SLP Therapies (Active)     Topic: Physical Therapy (Active)     Point: Mobility training (Active)     Learning Progress Summary           Patient Acceptance, D, NR by BS at 11/22/2018 12:00 PM    Comment:  vc's and tc's for  correctly executing TDWB on L LE upon assuming standing, as well as cues for scooting self to the HOB.                   Point: Home exercise program (Active)     Learning Progress Summary           Patient Acceptance, D, NR by BS at 11/22/2018 12:00 PM    Comment:  vc's and tc's for correctly executing TDWB on L LE upon assuming standing, as well as cues for scooting self to the HOB.                   Point: Body mechanics (Active)     Learning Progress Summary           Patient Acceptance, D, NR by BS at 11/22/2018 12:00 PM    Comment:  vc's and tc's for correctly executing TDWB on L LE upon assuming standing, as well as cues for scooting self to the HOB.                   Point: Precautions (Active)     Learning Progress Summary           Patient Acceptance, D, NR by BS at 11/22/2018 12:00 PM    Comment:  vc's and tc's for correctly executing TDWB on L LE upon assuming standing, as well as cues for scooting self to the HOB.                               User Key     Initials Effective Dates Name Provider Type Discipline     04/08/18 -  Tulio Duong, PT Physical Therapist PT                PT Recommendation and Plan  Anticipated Discharge Disposition (PT): home with assist, home with home health, inpatient rehabilitation facility  Therapy Frequency (PT Clinical Impression): 2 times/day  Outcome Summary/Treatment Plan (PT)  Daily Summary of Progress (PT): progress toward functional goals as expected  Plan for Continued Treatment (PT): gt and mobiity  Anticipated Discharge Disposition (PT): home with assist, home with home health, inpatient rehabilitation facility  Plan of Care Reviewed With: patient  Progress: improving  Outcome Summary: pt did well with mobility. min to eob and min to stand. transfer to [facility] with min assist. cont with mobiity. ttwb  Outcome Measures     Row Name 11/22/18 1038 11/22/18 0824          How much help from another person do you currently need...    Turning from your back to your  side while in flat bed without using bedrails?  --  3  -BS     Moving from lying on back to sitting on the side of a flat bed without bedrails?  --  2  -BS     Moving to and from a bed to a chair (including a wheelchair)?  --  3  -BS     Standing up from a chair using your arms (e.g., wheelchair, bedside chair)?  --  3  -BS     Climbing 3-5 steps with a railing?  --  2  -BS     To walk in hospital room?  --  2  -BS     AM-PAC 6 Clicks Score  --  15  -BS        How much help from another is currently needed...    Putting on and taking off regular lower body clothing?  2  -RB  --     Bathing (including washing, rinsing, and drying)  2  -RB  --     Toileting (which includes using toilet bed pan or urinal)  2  -RB  --     Putting on and taking off regular upper body clothing  3  -RB  --     Taking care of personal grooming (such as brushing teeth)  4  -RB  --     Eating meals  4  -RB  --     Score  17  -RB  --        Functional Assessment    Outcome Measure Options  AM-PAC 6 Clicks Daily Activity (OT)  -RB  AM-PAC 6 Clicks Basic Mobility (PT)  -BS       User Key  (r) = Recorded By, (t) = Taken By, (c) = Cosigned By    Initials Name Provider Type    RB Tayo Davis, OT Occupational Therapist    Tulio Sadler, PT Physical Therapist         Time Calculation:   PT Charges     Row Name 11/22/18 1526 11/22/18 1202          Time Calculation    Start Time  1447  -  0824  -     Stop Time  1515  -  0917  -     Time Calculation (min)  28 min  -RW  53 min  -BS     PT Received On  --  11/22/18  -BS     PT Goal Re-Cert Due Date  --  12/05/18  -        Time Calculation- PT    Total Timed Code Minutes- PT  28 minute(s)  -RW  --        Timed Charges    76747 - PT Therapeutic Exercise Minutes  --  15  -BS       User Key  (r) = Recorded By, (t) = Taken By, (c) = Cosigned By    Initials Name Provider Type    RW Sanjay Bourne, PTA Physical Therapy Assistant    Tuilo Sadler, PT Physical Therapist        Therapy  Suggested Charges     Code   Minutes Charges    55760 (CPT®) Hc Pt Neuromusc Re Education Ea 15 Min      11096 (CPT®) Hc Pt Ther Proc Ea 15 Min 15 1    91375 (CPT®) Hc Gait Training Ea 15 Min      28982 (CPT®) Hc Pt Therapeutic Act Ea 15 Min      69890 (CPT®) Hc Pt Manual Therapy Ea 15 Min      19058 (CPT®) Hc Pt Iontophoresis Ea 15 Min      73188 (CPT®) Hc Pt Elec Stim Ea-Per 15 Min      69835 (CPT®) Hc Pt Ultrasound Ea 15 Min      87367 (CPT®) Hc Pt Self Care/Mgmt/Train Ea 15 Min      93121 (CPT®) Hc Pt Prosthetic (S) Train Initial Encounter, Each 15 Min      25418 (CPT®) Hc Pt Orthotic(S)/Prosthetic(S) Encounter, Each 15 Min      48158 (CPT®) Hc Orthotic(S) Mgmt/Train Initial Encounter, Each 15min      Total  15 1        Therapy Charges for Today     Code Description Service Date Service Provider Modifiers Qty    11828911441 HC PT THER PROC EA 15 MIN 11/22/2018 Sanjay Bourne, PTA GP 1    [id number] HC PT THERAPEUTIC ACT EA 15 MIN 11/22/2018 Sanjay Bourne, PTA GP 1          PT G-Codes  Outcome Measure Options: AM-PAC 6 Clicks Daily Activity (OT)  AM-PAC 6 Clicks Score: 15  Score: 17  Functional Limitation: Mobility: Walking and moving around  Mobility: Walking and Moving Around Current Status (): At least 40 percent but less than 60 percent impaired, limited or restricted  Mobility: Walking and Moving Around Goal Status (): At least 20 percent but less than 40 percent impaired, limited or restricted    Sanjay Bourne PTA  11/22/2018

## 2018-11-22 NOTE — PLAN OF CARE
Problem: Patient Care Overview  Goal: Plan of Care Review  Outcome: Ongoing (interventions implemented as appropriate)   11/22/18 0801   Coping/Psychosocial   Plan of Care Reviewed With patient  (art)   OTHER   Outcome Summary PT evaluation completed. Pt limited by severe left hip pain with movement s/p L hip ORIF. Would benefit from skilled PT to address pain, ROM, strength as well as mobility deficits to facilitate return to PLOF. Has DME at home. Recommend discharge (when ready) home with wife and home health PT upon d/c.

## 2018-11-22 NOTE — PLAN OF CARE
Problem: Patient Care Overview  Goal: Plan of Care Review  Outcome: Ongoing (interventions implemented as appropriate)   11/22/18 5026   Coping/Psychosocial   Plan of Care Reviewed With patient   Plan of Care Review   Progress improving   OTHER   Outcome Summary pt did well with mobility. min to eob and min to stand. transfer to [facility] with min assist. cont with mobiity. ttwb

## 2018-11-22 NOTE — PROGRESS NOTES
ORTHOPEDIC PROGRESS NOTE:    Name:  Maciej Funk  Date:    11/22/2018  Date of admission:  11/20/2018    Post op day:  1 Day Post-Op  Procedure:    Procedure(s) (LRB):  FEMUR INTRAMEDULLARY NAILING/RODDING (Left)    Subjective: Doing pretty well today.  Sitting up in bed, responsive and interactive    Vitals:    Vitals:    11/22/18 0412   BP: 138/63   Pulse: 106   Resp: 18   Temp: 97.7 °F (36.5 °C)   SpO2: 96%       Exam: Hard of hearing, alert and ring questions.  Moves toes well dressing is dry    Lab Results (last 24 hours)     Procedure Component Value Units Date/Time    Comprehensive Metabolic Panel [[id number]]  (Abnormal) Collected:  11/22/18 0556    Specimen:  Blood Updated:  11/22/18 0647     Glucose 130 mg/dL      BUN 18 mg/dL      Creatinine 0.97 mg/dL      Sodium 135 mmol/L      Potassium 4.4 mmol/L      Chloride 102 mmol/L      CO2 26.0 mmol/L      Calcium 7.9 mg/dL      Total Protein 5.5 g/dL      Albumin 3.10 g/dL      ALT (SGPT) 18 U/L      AST (SGOT) 28 U/L      Alkaline Phosphatase 50 U/L      Total Bilirubin 1.1 mg/dL      eGFR Non African Amer 74 mL/min/1.73      Globulin 2.4 gm/dL      A/G Ratio 1.3 g/dL      BUN/Creatinine Ratio 18.6     Anion Gap 7.0 mmol/L     Narrative:       The MDRD GFR formula is only valid for adults with stable renal function between ages 18 and 70.    CBC & Differential [[id number]] Collected:  11/22/18 0556    Specimen:  Blood Updated:  11/22/18 0621    Narrative:       The following orders were created for panel order CBC & Differential.  Procedure                               Abnormality         Status                     ---------                               -----------         ------                     CBC Auto Differential[[id number]]        Abnormal            Final result                 Please view results for these tests on the individual orders.    CBC Auto Differential [[id number]]  (Abnormal) Collected:  11/22/18 0556    Specimen:  Blood Updated:   11/22/18 0621     WBC 8.72 10*3/mm3      RBC 3.27 10*6/mm3      Hemoglobin 10.2 g/dL      Hematocrit 29.3 %      MCV 89.6 fL      MCH 31.2 pg      MCHC 34.8 g/dL      RDW 12.1 %      RDW-SD 39.8 fl      MPV 9.3 fL      Platelets 155 10*3/mm3      Neutrophil % 74.7 %      Lymphocyte % 16.9 %      Monocyte % 7.6 %      Eosinophil % 0.3 %      Basophil % 0.2 %      Immature Grans % 0.3 %      Neutrophils, Absolute 6.51 10*3/mm3      Lymphocytes, Absolute 1.47 10*3/mm3      Monocytes, Absolute 0.66 10*3/mm3      Eosinophils, Absolute 0.03 10*3/mm3      Basophils, Absolute 0.02 10*3/mm3      Immature Grans, Absolute 0.03 10*3/mm3     Urine Culture - Urine, Urine, Catheter [[id number]]  (Normal) Collected:  11/20/18 1320    Specimen:  Urine, Catheter Updated:  11/22/18 0619     Urine Culture No growth at 24 hours          ASSESSMENT:  Principal Problem:    Closed displaced intertrochanteric fracture of left femur (CMS/HCC)  Active Problems:    Hypertension    Emphysema of lung (CMS/HCC)    Atherosclerosis        PLAN: Spoke to his son as well as the patient today.  He appears to be doing fairly well.  He is starting to work with therapy this morning.  We will DC his [place] progress as planned.  He will need discharge planning work stable      Gene Multani MD  11/22/18  8:55 AM

## 2018-11-22 NOTE — PLAN OF CARE
Problem: Patient Care Overview  Goal: Plan of Care Review  Outcome: Ongoing (interventions implemented as appropriate)   11/22/18 1250   Coping/Psychosocial   Plan of Care Reviewed With patient   OTHER   Outcome Summary OT eval complete on this date. Pt required min/mod A for sup to sit, min A for sit to stand, 8' ambulation and toilet transfer. Min A needed to doff sock. Pt was independent prior with ADLs. He could benefit from OT services to regain lost strength and function for ADLs and functional mobility.

## 2018-11-23 LAB
ALBUMIN SERPL-MCNC: 2.8 G/DL (ref 3.4–4.8)
ALBUMIN/GLOB SERPL: 1.1 G/DL (ref 1.1–1.8)
ALP SERPL-CCNC: 53 U/L (ref 38–126)
ALT SERPL W P-5'-P-CCNC: 19 U/L (ref 21–72)
ANION GAP SERPL CALCULATED.3IONS-SCNC: 8 MMOL/L (ref 5–15)
AST SERPL-CCNC: 24 U/L (ref 17–59)
BASOPHILS # BLD AUTO: 0.02 10*3/MM3 (ref 0–0.2)
BASOPHILS NFR BLD AUTO: 0.2 % (ref 0–2)
BILIRUB SERPL-MCNC: 1.3 MG/DL (ref 0.2–1.3)
BUN BLD-MCNC: 13 MG/DL (ref 7–21)
BUN/CREAT SERPL: 15.7 (ref 7–25)
CALCIUM SPEC-SCNC: 7.8 MG/DL (ref 8.4–10.2)
CHLORIDE SERPL-SCNC: 102 MMOL/L (ref 95–110)
CO2 SERPL-SCNC: 22 MMOL/L (ref 22–31)
CREAT BLD-MCNC: 0.83 MG/DL (ref 0.7–1.3)
DEPRECATED RDW RBC AUTO: 38.3 FL (ref 35.1–43.9)
EOSINOPHIL # BLD AUTO: 0.17 10*3/MM3 (ref 0–0.7)
EOSINOPHIL NFR BLD AUTO: 2.1 % (ref 0–7)
ERYTHROCYTE [DISTWIDTH] IN BLOOD BY AUTOMATED COUNT: 11.9 % (ref 11.5–14.5)
GFR SERPL CREATININE-BSD FRML MDRD: 89 ML/MIN/1.73 (ref 42–98)
GLOBULIN UR ELPH-MCNC: 2.6 GM/DL (ref 2.3–3.5)
GLUCOSE BLD-MCNC: 104 MG/DL (ref 60–100)
HCT VFR BLD AUTO: 27.3 % (ref 39–49)
HGB BLD-MCNC: 9.7 G/DL (ref 13.7–17.3)
IMM GRANULOCYTES # BLD: 0.02 10*3/MM3 (ref 0–0.02)
IMM GRANULOCYTES NFR BLD: 0.2 % (ref 0–0.5)
LYMPHOCYTES # BLD AUTO: 1.74 10*3/MM3 (ref 0.6–4.2)
LYMPHOCYTES NFR BLD AUTO: 21.1 % (ref 10–50)
MCH RBC QN AUTO: 31.2 PG (ref 26.5–34)
MCHC RBC AUTO-ENTMCNC: 35.5 G/DL (ref 31.5–36.3)
MCV RBC AUTO: 87.8 FL (ref 80–98)
MONOCYTES # BLD AUTO: 0.58 10*3/MM3 (ref 0–0.9)
MONOCYTES NFR BLD AUTO: 7 % (ref 0–12)
NEUTROPHILS # BLD AUTO: 5.72 10*3/MM3 (ref 2–8.6)
NEUTROPHILS NFR BLD AUTO: 69.4 % (ref 37–80)
NT-PROBNP SERPL-MCNC: 1610 PG/ML (ref 0–1800)
PLATELET # BLD AUTO: 147 10*3/MM3 (ref 150–450)
PMV BLD AUTO: 9.1 FL (ref 8–12)
POTASSIUM BLD-SCNC: 4.7 MMOL/L (ref 3.5–5.1)
PROT SERPL-MCNC: 5.4 G/DL (ref 6.3–8.6)
RBC # BLD AUTO: 3.11 10*6/MM3 (ref 4.37–5.74)
SODIUM BLD-SCNC: 132 MMOL/L (ref 137–145)
WBC NRBC COR # BLD: 8.25 10*3/MM3 (ref 3.2–9.8)

## 2018-11-23 PROCEDURE — 80053 COMPREHEN METABOLIC PANEL: CPT | Performed by: NURSE PRACTITIONER

## 2018-11-23 PROCEDURE — 83880 ASSAY OF NATRIURETIC PEPTIDE: CPT | Performed by: NURSE PRACTITIONER

## 2018-11-23 PROCEDURE — 97530 THERAPEUTIC ACTIVITIES: CPT

## 2018-11-23 PROCEDURE — 97110 THERAPEUTIC EXERCISES: CPT

## 2018-11-23 PROCEDURE — 25810000003 SODIUM CHLORIDE 0.9 % WITH KCL 20 MEQ 20-0.9 MEQ/L-% SOLUTION: Performed by: ORTHOPAEDIC SURGERY

## 2018-11-23 PROCEDURE — 97116 GAIT TRAINING THERAPY: CPT

## 2018-11-23 PROCEDURE — 97535 SELF CARE MNGMENT TRAINING: CPT

## 2018-11-23 PROCEDURE — 85025 COMPLETE CBC W/AUTO DIFF WBC: CPT | Performed by: NURSE PRACTITIONER

## 2018-11-23 PROCEDURE — 99024 POSTOP FOLLOW-UP VISIT: CPT | Performed by: ORTHOPAEDIC SURGERY

## 2018-11-23 RX ADMIN — LISINOPRIL 20 MG: 20 TABLET ORAL at 09:10

## 2018-11-23 RX ADMIN — POTASSIUM CHLORIDE AND SODIUM CHLORIDE 100 ML/HR: 900; 150 INJECTION, SOLUTION INTRAVENOUS at 21:50

## 2018-11-23 RX ADMIN — HYDROCODONE BITARTRATE AND ACETAMINOPHEN 1 TABLET: 7.5; 325 TABLET ORAL at 05:31

## 2018-11-23 RX ADMIN — POTASSIUM CHLORIDE AND SODIUM CHLORIDE 100 ML/HR: 900; 150 INJECTION, SOLUTION INTRAVENOUS at 11:32

## 2018-11-23 RX ADMIN — LATANOPROST 1 DROP: 50 SOLUTION/ DROPS OPHTHALMIC at 21:50

## 2018-11-23 NOTE — SIGNIFICANT NOTE
11/23/18 0952   Rehab Treatment   Reason Treatment Not Performed unavailable for treatment  (pt with [facility] @ present time)

## 2018-11-23 NOTE — PLAN OF CARE
Problem: Patient Care Overview  Goal: Plan of Care Review  Outcome: Ongoing (interventions implemented as appropriate)   11/23/18 3271   Coping/Psychosocial   Plan of Care Reviewed With patient   Plan of Care Review   Progress improving   OTHER   Outcome Summary Pt tolerated tx well this date. Pt was min A with sit-stand t/f. Pt was CGA/min A with chair-bed t/f. Pt was min/mod A with toilet t/f. Continue OT POC.

## 2018-11-23 NOTE — THERAPY TREATMENT NOTE
Acute Care - Physical Therapy Treatment Note  [facility]     Patient Name: Maciej Funk  : 1935  MRN: [id number]  Today's Date: 2018  Onset of Illness/Injury or Date of Surgery: 18  Date of Referral to PT: 18  Referring Physician: TRESA Villatoro MD.    Admit Date: 2018    Visit Dx:    ICD-10-CM ICD-9-CM   1. Closed displaced intertrochanteric fracture of left femur, initial encounter (CMS/HCC) S72.142A 820.21   2. Mass of right lung R91.8 786.6   3. Closed displaced subtrochanteric fracture of left femur, initial encounter (CMS/HCC) S72.22XA 820.22   4. Syncope, unspecified syncope type R55 780.2   5. Impaired functional mobility and activity tolerance Z74.09 V49.89   6. Impaired mobility and activities of daily living Z74.09 799.89     Patient Active Problem List   Diagnosis   • Hypertension   • History of adenomatous polyp of colon   • Hyperlipidemia   • Tobacco dependence syndrome   • Nicotine dependence, cigarettes, with other nicotine-induced disorders   • Carotid stenosis, bilateral   • Optic atrophy   • Nonexudative age-related macular degeneration   • Nuclear cataract   • Follow-up surgery care   • Closed displaced intertrochanteric fracture of left femur (CMS/[facility])   • Pulmonary hypertension (CMS/[facility])   • Emphysema of lung (CMS/[facility])   • Atherosclerosis       Therapy Treatment    Rehabilitation Treatment Summary     Row Name 18 1029 18 0937          Treatment Time/Intention    Discipline  physical therapy assistant  -TA  occupational therapy assistant  -CS     Document Type  therapy note (daily note)  -TA  therapy note (daily note)  -CS     Subjective Information  no complaints  -TA  no complaints  -CS     Mode of Treatment  individual therapy;physical therapy  -TA  occupational therapy  -CS     Therapy Frequency (PT Clinical Impression)  2 times/day  -TA  --     Therapy Frequency (OT Eval)  --  daily  -CS     Patient Effort  good  -TA  good  -CS      Existing Precautions/Restrictions  weight bearing TDWB on L LE, [place]  -TA  weight bearing TDWB on L LE, [place]  -CS     Recorded by [TA] Olesya Gutierres, PTA 11/23/18 1324 [CS] Mary Kauffman COTA/L 11/23/18 1139     Row Name 11/23/18 1029 11/23/18 0937          Vital Signs    Pre Systolic BP Rehab  169  -TA  134  -CS     Pre Treatment Diastolic BP  77  -TA  66  -CS     Post Systolic BP Rehab  --  169  -CS     Post Treatment Diastolic BP  --  77  -CS     Pretreatment Heart Rate (beats/min)  104  -TA  97  -CS     Intratreatment Heart Rate (beats/min)  107  -TA  --     Posttreatment Heart Rate (beats/min)  100  -TA  104  -CS     Pre SpO2 (%)  94  -TA  94  -CS     O2 Delivery Pre Treatment  room air  -TA  room air  -CS     Intra SpO2 (%)  97  -TA  --     O2 Delivery Intra Treatment  room air  -TA  --     Post SpO2 (%)  96  -TA  94  -CS     O2 Delivery Post Treatment  room air  -TA  room air  -CS     Pre Patient Position  Supine  -TA  Sitting  -CS     Post Patient Position  Supine  -TA  Supine  -CS     Recorded by [TA] Olesya Gutierres, PTA 11/23/18 1324 [CS] Mary Kauffman COTA/L 11/23/18 1139     Row Name 11/23/18 1029 11/23/18 0937          Cognitive Assessment/Intervention- PT/OT    Affect/Mental Status (Cognitive)  WNL  -TA  --     Orientation Status (Cognition)  oriented x 4  -TA  oriented x 4  -CS     Follows Commands (Cognition)  WNL  -TA  WNL  -CS     Personal Safety Interventions  fall prevention program maintained;gait belt;nonskid shoes/slippers when out of bed;supervised activity  -TA  --     Recorded by [TA] Olesya Gutierres, PTA 11/23/18 1324 [CS] Mary Kauffman COTA/L 11/23/18 1139     Row Name 11/23/18 1029             Mobility Assessment/Intervention    Extremity Weight-bearing Status  left lower extremity  -TA      Left Lower Extremity (Weight-bearing Status)  touch down weight-bearing (TDWB)  (Significant)   -TA      Recorded by [TA] Olesya Gutierres, PTA 11/23/18 1324      Row Name 11/23/18  1029 11/23/18 0937          Bed Mobility Assessment/Treatment    Bed Mobility Assessment/Treatment  sit-supine;supine-sit  -TA  sit-supine  -CS     Supine-Sit [place] (Bed Mobility)  minimum assist (75% patient effort)  -TA  --     Sit-Supine [place] (Bed Mobility)  minimum assist (75% patient effort)  -TA  minimum assist (75% patient effort);moderate assist (50% patient effort)  -CS     Bed Mobility, Safety Issues  decreased use of legs for bridging/pushing  -TA  --     Assistive Device (Bed Mobility)  bed rails;head of bed elevated  -TA  --     Recorded by [TA] Olesya Gutierres, PTA 11/23/18 1324 [CS] Mary Kauffman MONTES/L 11/23/18 1139     Row Name 11/23/18 1029 11/23/18 0937          Functional Mobility    Functional Mobility- Ind. Level  minimum assist (75% patient effort)  -TA  contact guard assist;minimum assist (75% patient effort)  -CS     Functional Mobility- Device  rolling walker  -TA  rolling walker  -CS     Functional Mobility-Distance (Feet)  --  15  -CS     Functional Mobility-Maintain WBing  able to maintain weight bearing status  -TA  --     Recorded by [TA] Olesya Gutierres, PTA 11/23/18 1324 [CS] Mary Kauffman MONTES/L 11/23/18 1139     Row Name 11/23/18 1029 11/23/18 0937          Transfer Assessment/Treatment    Transfer Assessment/Treatment  sit-stand transfer  -TA  sit-stand transfer;stand-sit transfer;chair-bed transfer;toilet transfer  -CS     Recorded by [TA] Olesya Gutierres, PTA 11/23/18 1324 [CS] Mary Kauffman MONTES/L 11/23/18 1139     Row Name 11/23/18 0937             Chair-Bed Transfer    Chair-Bed [place] (Transfers)  contact guard;minimum assist (75% patient effort)  -CS      Assistive Device (Chair-Bed Transfers)  walker, front-wheeled  -CS      Recorded by [CS] Mary Kauffman MONTES/L 11/23/18 1139      Row Name 11/23/18 1029 11/23/18 0937          Sit-Stand Transfer    Sit-Stand [place] (Transfers)  minimum assist (75% patient effort)  -TA  minimum  assist (75% patient effort)  -CS     Assistive Device (Sit-Stand Transfers)  walker, front-wheeled  -TA  walker, front-wheeled  -CS     Recorded by [TA] Olesya Gutierres, PTA 11/23/18 1324 [CS] Mary Kauffman MONTES/L 11/23/18 1139     Row Name 11/23/18 1029 11/23/18 0937          Stand-Sit Transfer    Stand-Sit [place] (Transfers)  minimum assist (75% patient effort)  -TA  minimum assist (75% patient effort)  -CS     Assistive Device (Stand-Sit Transfers)  walker, front-wheeled  -TA  walker, front-wheeled  -CS     Recorded by [TA] Olesya Gutierres, PTA 11/23/18 1324 [CS] Mary Kauffman MONTES/L 11/23/18 1139     Row Name 11/23/18 0937             Toilet Transfer    Type (Toilet Transfer)  sit-stand;stand-sit  -CS      [place] Level (Toilet Transfer)  minimum assist (75% patient effort);moderate assist (50% patient effort)  -CS      Assistive Device (Toilet Transfer)  walker, front-wheeled  -CS      Recorded by [CS] Mary Kauffman MONTES/L 11/23/18 1139      Row Name 11/23/18 1029             Gait/Stairs Assessment/Training    [place] Level (Gait)  minimum assist (75% patient effort)  -TA      Assistive Device (Gait)  walker, front-wheeled  -TA      Distance in Feet (Gait)  3 >HOB  -TA      Recorded by [TA] Olesya Gutierres, PTA 11/23/18 1324      Row Name 11/23/18 0937             ADL Assessment/Intervention    BADL Assessment/Intervention  grooming;toileting;bathing;upper body dressing  -CS      Recorded by [CS] Mary Kauffman MONTES/L 11/23/18 1139      Row Name 11/23/18 0937             Bathing Assessment/Intervention    Comment (Bathing)  Pt stated that he already had a bath this am.  -CS      Recorded by [CS] Mary Kauffman MONTES/L 11/23/18 1139      Row Name 11/23/18 0937             Upper Body Dressing Assessment/Training    Upper Body Dressing [place] Level  doff;don;minimum assist (75% patient effort) hospital gown  -CS      Upper Body Dressing Position  supported sitting  -CS       Recorded by [CS] Mary Kauffman COTA/L 11/23/18 1139      Row Name 11/23/18 0937             Lower Body Dressing Assessment/Training    Lower Body Dressing [place] Level  doff;don;socks;maximum assist (25% patient effort)  -CS      Lower Body Dressing Position  supported sitting  -CS      Recorded by [CS] Mary Kauffman COTA/L 11/23/18 1139      Row Name 11/23/18 0937             Grooming Assessment/Training    [place] Level (Grooming)  grooming skills;hair care, combing/brushing;wash face, hands;set up  -CS      Grooming Position  supported sitting  -CS      Recorded by [CS] Mary Kauffman COTA/L 11/23/18 1139      Row Name 11/23/18 0937             Toileting Assessment/Training    [place] Level (Toileting)  toileting skills;perform perineal hygiene;supervision  -CS      Assistive Devices (Toileting)  dressing stick  -CS      Toileting Position  supported sitting  -CS      Recorded by [CS] Mary Kauffman COTA/L 11/23/18 1139      Row Name 11/23/18 1029 11/23/18 0937          Therapeutic Exercise    Upper Extremity (Therapeutic Exercise)  --  bicep curl, bilateral  -CS     Upper Extremity Range of Motion (Therapeutic Exercise)  --  shoulder flexion/extension, bilateral;forearm supination/pronation, bilateral;wrist flexion/extension, bilateral;elbow flexion/extension, bilateral  -CS     Lower Extremity (Therapeutic Exercise)  gluteal sets;quad sets, bilateral;heel slides, bilateral;LAQ (long arc quad), bilateral  -TA  --     Lower Extremity Range of Motion (Therapeutic Exercise)  ankle dorsiflexion/plantar flexion, bilateral  -TA  --     Weight/Resistance (Therapeutic Exercise)  --  1 pound;2 pounds  -CS     Exercise Type (Therapeutic Exercise)  AROM (active range of motion)  -TA  AROM (active range of motion)  -CS     Position (Therapeutic Exercise)  supine;seated  -TA  seated  -CS     Sets/Reps (Therapeutic Exercise)  20  -TA  1/20  -CS     Equipment (Therapeutic Exercise)  --   free weight, barbell  -CS     Expected Outcome (Therapeutic Exercise)  improve functional stability;improve functional tolerance, household activity;improve functional tolerance, self-care activity;improve performance, gait skills;improve performance, transfer skills  -TA  improve functional tolerance, self-care activity;improve object manipulation, self-care activity;improve performance, transfer skills  -CS     Recorded by [TA] Olesya Gutierres, PTA 11/23/18 1324 [CS] Mary Kauffman COTA/L 11/23/18 1139     Row Name 11/23/18 1029 11/23/18 0937          Positioning and Restraints    Pre-Treatment Position  in bed  -TA  sitting in chair/recliner  -CS     Post Treatment Position  bed  -TA  bed  -CS     In Bed  supine;call light within reach;exit alarm on;with family/caregiver  -TA  supine;call light within reach;encouraged to call for assist;exit alarm on  -CS     Recorded by [TA] Olesya Gutierres, PTA 11/23/18 1324 [CS] Mary Kauffman COTA/L 11/23/18 1139     Row Name 11/23/18 1029 11/23/18 0937          Pain Scale: Numbers Pre/Post-Treatment    Pain Scale: Numbers, Pretreatment  0/10 - no pain  -TA  2/10  -CS     Pain Scale: Numbers, Post-Treatment  0/10 - no pain  -TA  1/10  -CS     Pain Location - Side  --  -TA  Left  -CS     Pain Location - Orientation  --  -TA  posterior  -CS     Pain Location  --  -TA  hip  -CS     Recorded by [TA] Olesya Gutierres, PTA 11/23/18 1324 [CS] Mary Kauffman MONTES/L 11/23/18 1139     Row Name                Wound 11/21/18 1538 Left hip incision;surgical    Wound - Properties Group Date first assessed: 11/21/18 [KM] Time first assessed: 1538 [KM] Present On Admission : no [KM] Side: Left [KM] Location: hip [KM] Type: incision;surgical [KM] Recorded by:  [KM] Martha Juan RN 11/21/18 1538    Row Name 11/23/18 0937             Outcome Summary/Treatment Plan (OT)    Daily Summary of Progress (OT)  progress toward functional goals is good  -CS      Plan for Continued  Treatment (OT)  cont OT POC  -CS      Anticipated Discharge Disposition (OT)  home with home health;home with assist  -CS      Recorded by [CS] Mary Kauffman MONTES/L 11/23/18 1139      Row Name 11/23/18 1029             Outcome Summary/Treatment Plan (PT)    Daily Summary of Progress (PT)  progress toward functional goals as expected  -TA      Plan for Continued Treatment (PT)  continue  -TA      Anticipated Discharge Disposition (PT)  home with assist;home with home health;inpatient rehabilitation facility  -TA      Recorded by [TA] Olesya Gutierres, PTA 11/23/18 1324        User Key  (r) = Recorded By, (t) = Taken By, (c) = Cosigned By    Initials Name Effective Dates Discipline    TA Olesya Gutierres, PTA 03/07/18 -  PT    CS Mary Kauffman MONTES/L 03/07/18 -  OT    KM Martha Juan, RN 05/21/18 -  Nurse          Wound 11/21/18 1538 Left hip incision;surgical (Active)   Dressing Appearance open to air 11/23/2018  7:10 AM   Closure [place] 11/23/2018  7:10 AM   Periwound blistered 11/23/2018  7:10 AM   Dressing Care, Wound dressing removed 11/23/2018  5:00 AM       PT Rehab Goals     Row Name 11/23/18 1029             Bed Mobility Goal 1 (PT)    Activity/Assistive Device (Bed Mobility Goal 1, PT)  sit to supine/supine to sit  -TA      [place] Level/Cues Needed (Bed Mobility Goal 1, PT)  standby assist  -TA      Time Frame (Bed Mobility Goal 1, PT)  by discharge  -TA      Barriers (Bed Mobility Goal 1, PT)  pain  -TA      Progress/Outcomes (Bed Mobility Goal 1, PT)  goal not met  -TA         Transfer Goal 1 (PT)    Activity/Assistive Device (Transfer Goal 1, PT)  sit-to-stand/stand-to-sit  -TA      [place] Level/Cues Needed (Transfer Goal 1, PT)  standby assist  -TA      Time Frame (Transfer Goal 1, PT)  by discharge  -TA      Barriers (Transfers Goal 1, PT)  pain, wt bearing restrictions  -TA      Progress/Outcome (Transfer Goal 1, PT)  goal not met  -TA         Gait Training Goal 1 (PT)     Activity/Assistive Device (Gait Training Goal 1, PT)  walker, rolling  -TA      [place] Level (Gait Training Goal 1, PT)  standby assist  -TA      Distance (Gait Goal 1, PT)  10 ft x 1 maintaining TDWB on L LE consistently  -TA      Time Frame (Gait Training Goal 1, PT)  by discharge  -TA      Barriers (Gait Training Goal 1, PT)  pain, fatigue, impaired standing balance  -TA      Progress/Outcome (Gait Training Goal 1, PT)  goal not met  -TA         Stairs Goal 1 (PT)    Activity/Assistive Device (Stairs Goal 1, PT)  step-to-step;ascending stairs;descending stairs  -TA      [place] Level/Cues Needed (Stairs Goal 1, PT)  contact guard assist  -TA      Number of Stairs (Stairs Goal 1, PT)  2  -TA      Time Frame (Stairs Goal 1, PT)  by discharge  -TA      Barriers (Stairs Goal 1, PT)  TDWB on L LE, pain  -TA      Progress/Outcome (Stairs Goal 1, PT)  goal not met  -TA        User Key  (r) = Recorded By, (t) = Taken By, (c) = Cosigned By    Initials Name Provider Type Discipline    Olesya Romero, PTA Physical Therapy Assistant PT          Physical Therapy Education     Title: PT OT SLP Therapies (Active)     Topic: Physical Therapy (Active)     Point: Mobility training (Active)     Learning Progress Summary           Patient Acceptance, D, NR by BS at 11/22/2018 12:00 PM    Comment:  vc's and tc's for correctly executing TDWB on L LE upon assuming standing, as well as cues for scooting self to the HOB.                   Point: Home exercise program (Active)     Learning Progress Summary           Patient Acceptance, D, NR by BS at 11/22/2018 12:00 PM    Comment:  vc's and tc's for correctly executing TDWB on L LE upon assuming standing, as well as cues for scooting self to the HOB.                   Point: Body mechanics (Active)     Learning Progress Summary           Patient Acceptance, D, NR by BS at 11/22/2018 12:00 PM    Comment:  vc's and tc's for correctly executing TDWB on L LE upon assuming  standing, as well as cues for scooting self to the HOB.                   Point: Precautions (Active)     Learning Progress Summary           Patient Acceptance, D, NR by BS at 11/22/2018 12:00 PM    Comment:  vc's and tc's for correctly executing TDWB on L LE upon assuming standing, as well as cues for scooting self to the HOB.                               User Key     Initials Effective Dates Name Provider Type Discipline     04/08/18 -  Tulio Duong, PT Physical Therapist PT                PT Recommendation and Plan  Anticipated Discharge Disposition (PT): home with assist, home with home health, inpatient rehabilitation facility  Therapy Frequency (PT Clinical Impression): 2 times/day  Outcome Summary/Treatment Plan (PT)  Daily Summary of Progress (PT): progress toward functional goals as expected  Plan for Continued Treatment (PT): continue  Anticipated Discharge Disposition (PT): home with assist, home with home health, inpatient rehabilitation facility  Outcome Summary: pt sup<>sit with min assist, sit<>stand with min assist, pt side stepped 3` with RW & min assist, pt would benefit from home with assist & HHPT @ D/C  Outcome Measures     Row Name 11/23/18 1300 11/23/18 1100 11/22/18 1038       How much help from another person do you currently need...    Turning from your back to your side while in flat bed without using bedrails?  3  -TA  --  --    Moving from lying on back to sitting on the side of a flat bed without bedrails?  3  -TA  --  --    Moving to and from a bed to a chair (including a wheelchair)?  3  -TA  --  --    Standing up from a chair using your arms (e.g., wheelchair, bedside chair)?  3  -TA  --  --    Climbing 3-5 steps with a railing?  2  -TA  --  --    To walk in hospital room?  2  -TA  --  --    AM-PAC 6 Clicks Score  16  -TA  --  --       How much help from another is currently needed...    Putting on and taking off regular lower body clothing?  --  2  -CS  2  -RB    Bathing  (including washing, rinsing, and drying)  --  2  -CS  2  -RB    Toileting (which includes using toilet bed pan or urinal)  --  2  -CS  2  -RB    Putting on and taking off regular upper body clothing  --  3  -CS  3  -RB    Taking care of personal grooming (such as brushing teeth)  --  4  -CS  4  -RB    Eating meals  --  4  -CS  4  -RB    Score  --  17  -CS  17  -RB       Functional Assessment    Outcome Measure Options  AM-PAC 6 Clicks Basic Mobility (PT)  -TA  AM-PAC 6 Clicks Daily Activity (OT)  -CS  AM-PAC 6 Clicks Daily Activity (OT)  -RB    Row Name 11/22/18 0824             How much help from another person do you currently need...    Turning from your back to your side while in flat bed without using bedrails?  3  -BS      Moving from lying on back to sitting on the side of a flat bed without bedrails?  2  -BS      Moving to and from a bed to a chair (including a wheelchair)?  3  -BS      Standing up from a chair using your arms (e.g., wheelchair, bedside chair)?  3  -BS      Climbing 3-5 steps with a railing?  2  -BS      To walk in hospital room?  2  -BS      AM-PAC 6 Clicks Score  15  -BS         Functional Assessment    Outcome Measure Options  AM-PAC 6 Clicks Basic Mobility (PT)  -BS        User Key  (r) = Recorded By, (t) = Taken By, (c) = Cosigned By    Initials Name Provider Type    RB Tayo Davis, OT Occupational Therapist    Olesya Romero PTA Physical Therapy Assistant    CS Mary Kauffman COTA/BARRERA Occupational Therapy Assistant    BS Tulio Duong, PT Physical Therapist         Time Calculation:   PT Charges     Row Name 11/23/18 1326             Time Calculation    Start Time  1029  -TA      Stop Time  1059  -TA      Time Calculation (min)  30 min  -TA         Time Calculation- PT    Total Timed Code Minutes- PT  30 minute(s)  -TA        User Key  (r) = Recorded By, (t) = Taken By, (c) = Cosigned By    Initials Name Provider Type    Olesya Romero PTA Physical Therapy Assistant         Therapy Suggested Charges     Code   Minutes Charges    03999 (CPT®) Hc Pt Neuromusc Re Education Ea 15 Min      65468 (CPT®) Hc Pt Ther Proc Ea 15 Min 15 1    52459 (CPT®) Hc Gait Training Ea 15 Min      44527 (CPT®) Hc Pt Therapeutic Act Ea 15 Min      19611 (CPT®) Hc Pt Manual Therapy Ea 15 Min      23495 (CPT®) Hc Pt Iontophoresis Ea 15 Min      55085 (CPT®) Hc Pt Elec Stim Ea-Per 15 Min      93382 (CPT®) Hc Pt Ultrasound Ea 15 Min      55117 (CPT®) Hc Pt Self Care/Mgmt/Train Ea 15 Min      21816 (CPT®) Hc Pt Prosthetic (S) Train Initial Encounter, Each 15 Min      69469 (CPT®) Hc Pt Orthotic(S)/Prosthetic(S) Encounter, Each 15 Min      34259 (CPT®) Hc Orthotic(S) Mgmt/Train Initial Encounter, Each 15min      Total  15 1        Therapy Charges for Today     Code Description Service Date Service Provider Modifiers Qty    81312394787 HC PT THERAPEUTIC ACT EA 15 MIN 11/23/2018 Olesya Gutierres, PTA GP 1    88547256555 HC PT THER PROC EA 15 MIN 11/23/2018 Olesya Gutierres, PTA GP 1          PT G-Codes  Outcome Measure Options: AM-PAC 6 Clicks Basic Mobility (PT)  AM-PAC 6 Clicks Score: 16  Score: 17  Functional Limitation: Mobility: Walking and moving around  Mobility: Walking and Moving Around Current Status (): At least 40 percent but less than 60 percent impaired, limited or restricted  Mobility: Walking and Moving Around Goal Status (): At least 20 percent but less than 40 percent impaired, limited or restricted    Olesya Gutierres PTA  11/23/2018

## 2018-11-23 NOTE — PLAN OF CARE
Problem: Patient Care Overview  Goal: Plan of Care Review  Outcome: Ongoing (interventions implemented as appropriate)   11/23/18 1247 11/23/18 2185   Coping/Psychosocial   Plan of Care Reviewed With patient --    Plan of Care Review   Progress --  improving   OTHER   Outcome Summary --  pt sup<>sit with min assist, sit<>stand with min assist, pt ambulated 40` with RW & min assist, pt would benefit from home with 24/7 care & continued PT services

## 2018-11-23 NOTE — PROGRESS NOTES
ORTHOPEDIC PROGRESS NOTE:    Name:  Maciej Funk  Date:    11/23/2018  Date of admission:  11/20/2018    Post op day:  2 Days Post-Op  Procedure:    Procedure(s) (LRB):  FEMUR INTRAMEDULLARY NAILING/RODDING (Left)    Subjective: Feels better than yesterday.  Sitting up in chair    Vitals:    Vitals:    11/23/18 0852   BP: 149/80   Pulse: 93   Resp: 18   Temp:    SpO2: 95%       Exam: Wound looks good.  Calf is negative.  Neurovascularly unchanged    Lab Results (last 24 hours)     Procedure Component Value Units Date/Time    Comprehensive Metabolic Panel [[id number]]  (Abnormal) Collected:  11/23/18 0600    Specimen:  Blood Updated:  11/23/18 0855     Glucose 104 mg/dL      BUN 13 mg/dL      Creatinine 0.83 mg/dL      Sodium 132 mmol/L      Potassium 4.7 mmol/L      Chloride 102 mmol/L      CO2 22.0 mmol/L      Calcium 7.8 mg/dL      Total Protein 5.4 g/dL      Albumin 2.80 g/dL      ALT (SGPT) 19 U/L      AST (SGOT) 24 U/L      Alkaline Phosphatase 53 U/L      Total Bilirubin 1.3 mg/dL      eGFR Non African Amer 89 mL/min/1.73      Globulin 2.6 gm/dL      A/G Ratio 1.1 g/dL      BUN/Creatinine Ratio 15.7     Anion Gap 8.0 mmol/L     Narrative:       The MDRD GFR formula is only valid for adults with stable renal function between ages 18 and 70.    CBC & Differential [[id number]] Collected:  11/23/18 0830    Specimen:  Blood Updated:  11/23/18 0844    Narrative:       The following orders were created for panel order CBC & Differential.  Procedure                               Abnormality         Status                     ---------                               -----------         ------                     CBC Auto Differential[[id number]]        Abnormal            Final result                 Please view results for these tests on the individual orders.    CBC Auto Differential [[id number]]  (Abnormal) Collected:  11/23/18 0830    Specimen:  Blood Updated:  11/23/18 0844     WBC 8.25 10*3/mm3      RBC 3.11  10*6/mm3      Hemoglobin 9.7 g/dL      Hematocrit 27.3 %      MCV 87.8 fL      MCH 31.2 pg      MCHC 35.5 g/dL      RDW 11.9 %      RDW-SD 38.3 fl      MPV 9.1 fL      Platelets 147 10*3/mm3      Neutrophil % 69.4 %      Lymphocyte % 21.1 %      Monocyte % 7.0 %      Eosinophil % 2.1 %      Basophil % 0.2 %      Immature Grans % 0.2 %      Neutrophils, Absolute 5.72 10*3/mm3      Lymphocytes, Absolute 1.74 10*3/mm3      Monocytes, Absolute 0.58 10*3/mm3      Eosinophils, Absolute 0.17 10*3/mm3      Basophils, Absolute 0.02 10*3/mm3      Immature Grans, Absolute 0.02 10*3/mm3     BNP [[id number]]  (Normal) Collected:  11/23/18 0600    Specimen:  Blood Updated:  11/23/18 0659     proBNP 1,610.0 pg/mL           ASSESSMENT:  Principal Problem:    Closed displaced intertrochanteric fracture of left femur (CMS/HCC)  Active Problems:    Hypertension    Emphysema of lung (CMS/HCC)    Atherosclerosis        PLAN: Looks good, sitting up in chair today, discharge disposition pending, from or so standpoint likely ready to go.  Suture removal 10-12 days.  Continue PT at present weightbearing status.  Follow-up Dr. Hare 10-12 days      Gene Multani MD  11/23/18  9:23 AM

## 2018-11-23 NOTE — PROGRESS NOTES
[facility] Medicine Services  INPATIENT PROGRESS NOTE    Length of Stay: 3  Date of Admission: 11/20/2018  Primary Care Physician: Debbi Jackson MD    Subjective   Chief Complaint: No complaints    11/23/2018:  The patient is sitting up in a chair today in good spirits.  Patient adequately urinating after Sheffield removal yesterday.  Continues to work well with therapy.      11/22/2018:  The patient has no complaints today.  He is working well with PT/OT.  Sheffield removed this am.  Monitor output.  Echocardiogram today shows EF decreased to 36-40%. The patient reports no further dizziness.  Patient reports pain well-controlled.     Previous notes by GEOVANNY Quezada PA:      HPI:  This 83-year-old  male reported to the emergency department secondary to a fall at home.  Fall resulted in injury of the left hip, a minimally displaced intertrochanteric fracture.  Patient scheduled for surgical repair with Dr. Hare today.  Patient did fall secondary to dizziness.  It is unclear whether or not he lost consciousness as he denies loss of consciousness, however his wife reports that she found him unconscious.  Patient currently has no chest pain or shortness of air, no dizziness, no syncope, no palpitations.     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 Ervin, this is unchanged since previous ultrasounds.  Dr. Ervin discussed the case with anesthesiology and determined the patient was stable and cleared for surgery.     As emergency department provider was concerned that there could be a AAA, CT angiogram of the aorta was performed.  There was no aortic aneurysm found on film.     Echocardiogram performed in 2016 demonstrated ejection fraction of 50-55% without regional abnormalities.  There was grade 1 diastolic dysfunction, without significant valvular dysfunction.     EKG did demonstrate a left bundle branch  block, however patient has demonstrated this left bundle-branch block on previous EKGs, most recently available in January 2017.     Patient reports no seizure activity, no loss of bowel or bladder function, no focal weakness, no slurring of speech or drooping of face.  CT of the head demonstrates no acute findings.     83- year-old male presented to the ER on 11/20 due to falling at home and injuring his left hip. He admits to falling due to dizziness, but does not believe he lost consciousness. He was awaiting fixation of the left femur fracture this morning (11/21/18). Cardiology was consulted and cleared him for the surgery. The patient denies any current dizziness, headaches, shortness of breath, chest pain, or abdominal pain. The patient admits to a 2ppd history of smoking.        Review of Systems   Constitutional: Positive for activity change. Negative for fatigue.   HENT: Negative for ear pain and sore throat.    Eyes: Negative for pain and discharge.   Respiratory: Negative for cough and shortness of breath.    Cardiovascular: Negative for chest pain and palpitations.   Gastrointestinal: Negative for abdominal pain and nausea.   Endocrine: Negative for cold intolerance and heat intolerance.   Genitourinary: Negative for difficulty urinating and dysuria.   Musculoskeletal: Negative for arthralgias and gait problem.   Skin: Negative for color change and rash.   Neurological: Negative for dizziness and weakness.   Psychiatric/Behavioral: Negative for agitation and confusion.        Objective    Temp:  [96.9 °F (36.1 °C)-98.3 °F (36.8 °C)] 98 °F (36.7 °C)  Heart Rate:  [] 93  Resp:  [18] 18  BP: (113-149)/(55-82) 149/80    Physical Exam   Constitutional: He is oriented to person, place, and time. He appears well-developed and well-nourished.   HENT:   Head: Normocephalic and atraumatic.   Eyes: EOM are normal. Pupils are equal, round, and reactive to light.   Neck: Normal range of motion. Neck supple.    Cardiovascular: Normal rate and regular rhythm.   Pulmonary/Chest: Effort normal and breath sounds normal.   Abdominal: Soft. Bowel sounds are normal.   Musculoskeletal: Normal range of motion.   Neurological: He is alert and oriented to person, place, and time.   Skin: Skin is warm and dry.   Psychiatric: He has a normal mood and affect. His behavior is normal.     Results Review:  I have reviewed the labs, radiology results, and diagnostic studies.    Laboratory Data:   Results from last 7 days   Lab Units  11/23/18   0600  11/22/18   0556  11/21/18   0600  11/20/18   1048   SODIUM mmol/L  132*  135*  135*  136*   POTASSIUM mmol/L  4.7  4.4  4.4  3.8   CHLORIDE mmol/L  102  102  105  102   CO2 mmol/L  22.0  26.0  25.0  26.0   BUN mg/dL  13  18  20  20   CREATININE mg/dL  0.83  0.97  0.94  1.20   GLUCOSE mg/dL  104*  130*  94  132*   CALCIUM mg/dL  7.8*  7.9*  8.2*  8.7   BILIRUBIN mg/dL  1.3  1.1   --   1.1   ALK PHOS U/L  53  50   --   68   ALT (SGPT) U/L  19*  18*   --   18*   AST (SGOT) U/L  24  28   --   19   ANION GAP mmol/L  8.0  7.0  5.0  8.0     Estimated Creatinine Clearance: 78.5 mL/min (by C-G formula based on SCr of 0.83 mg/dL).  Results from last 7 days   Lab Units  11/20/18   1048   MAGNESIUM mg/dL  2.0         Results from last 7 days   Lab Units  11/23/18   0830  11/22/18   0556  11/21/18   0600  11/20/18   1048   WBC 10*3/mm3  8.25  8.72  7.81  17.16*   HEMOGLOBIN g/dL  9.7*  10.2*  11.1*  12.7*   HEMATOCRIT %  27.3*  29.3*  31.8*  36.6*   PLATELETS 10*3/mm3  147*  155  187  227     Results from last 7 days   Lab Units  11/20/18   1048   INR   1.10       Culture Data:   No results found for: BLOODCX  Urine Culture   Date Value Ref Range Status   11/20/2018 No growth at 24 hours  Final     No results found for: RESPCX  No results found for: WOUNDCX  No results found for: STOOLCX  No components found for: BODYFLD    Radiology Data:   Imaging Results (last 24 hours)     ** No results found for the  last 24 hours. **          I have reviewed the patient's current medications.     Assessment/Plan     Active Hospital Problems    Diagnosis   • **Closed displaced intertrochanteric fracture of left femur (CMS/HCC)   • Atherosclerosis   • Emphysema of lung (CMS/HCC)   • Hypertension       Plan:    1.  Closed displaced intertrochanteric fracture, left femur:  POD2 .  Continue PT/OT and pain control.    2.  HFrEF: Will set the patient up with cardiology as an outpatient for reduced EF. BNP stable.       Discharge Planning: I expect patient to be discharged to home in 1-2 days.      This document has been electronically signed by CHRISTINA Mi on November 23, 2018 10:28 AM

## 2018-11-23 NOTE — PLAN OF CARE
Problem: Fall Risk (Adult)  Goal: Absence of Fall  Outcome: Ongoing (interventions implemented as appropriate)      Problem: Patient Care Overview  Goal: Plan of Care Review  Outcome: Ongoing (interventions implemented as appropriate)   11/23/18 0416   Coping/Psychosocial   Plan of Care Reviewed With patient   Plan of Care Review   Progress improving   OTHER   Outcome Summary pt rested well during the night     Goal: Individualization and Mutuality  Outcome: Ongoing (interventions implemented as appropriate)    Goal: Discharge Needs Assessment  Outcome: Ongoing (interventions implemented as appropriate)      Problem: Skin Injury Risk (Adult)  Goal: Identify Related Risk Factors and Signs and Symptoms  Outcome: Outcome(s) achieved Date Met: 11/23/18    Goal: Skin Health and Integrity  Outcome: Ongoing (interventions implemented as appropriate)      Problem: Surgery Nonspecified (Adult)  Goal: Signs and Symptoms of Listed Potential Problems Will be Absent, Minimized or Managed (Surgery Nonspecified)  Outcome: Ongoing (interventions implemented as appropriate)

## 2018-11-23 NOTE — PLAN OF CARE
Problem: Patient Care Overview  Goal: Plan of Care Review  Outcome: Ongoing (interventions implemented as appropriate)   11/23/18 1029 11/23/18 1247   Coping/Psychosocial   Plan of Care Reviewed With --  patient   Plan of Care Review   Progress --  improving   OTHER   Outcome Summary pt sup<>sit with min assist, sit<>stand with min assist, pt side stepped 3` with RW & min assist, pt would benefit from home with assist & HHPT @ D/C --

## 2018-11-23 NOTE — THERAPY TREATMENT NOTE
Acute Care - Occupational Therapy Treatment Note  [facility]     Patient Name: Maciej Funk  : 1935  MRN: [id number]  Today's Date: 2018  Onset of Illness/Injury or Date of Surgery: 18  Date of Referral to OT: 18  Referring Physician: TRESA Villatoro MD.    Admit Date: 2018       ICD-10-CM ICD-9-CM   1. Closed displaced intertrochanteric fracture of left femur, initial encounter (CMS/Formerly [facility]) S72.142A 820.21   2. Mass of right lung R91.8 786.6   3. Closed displaced subtrochanteric fracture of left femur, initial encounter (CMS/Formerly [facility]) S72.22XA 820.22   4. Syncope, unspecified syncope type R55 780.2   5. Impaired functional mobility and activity tolerance Z74.09 V49.89   6. Impaired mobility and activities of daily living Z74.09 799.89     Patient Active Problem List   Diagnosis   • Hypertension   • History of adenomatous polyp of colon   • Hyperlipidemia   • Tobacco dependence syndrome   • Nicotine dependence, cigarettes, with other nicotine-induced disorders   • Carotid stenosis, bilateral   • Optic atrophy   • Nonexudative age-related macular degeneration   • Nuclear cataract   • Follow-up surgery care   • Closed displaced intertrochanteric fracture of left femur (CMS/HCC)   • Pulmonary hypertension (CMS/HCC)   • Emphysema of lung (CMS/HCC)   • Atherosclerosis     Past Medical History:   Diagnosis Date   • Abdominal aortic aneurysm (CMS/Formerly [facility])    • Carotid stenosis, bilateral    • Cataract    • Emphysema of lung (CMS/HCC)    • Herpes zoster    • Hyperlipidemia    • Hypertension      Past Surgical History:   Procedure Laterality Date   • CARDIAC CATHETERIZATION     • COLONOSCOPY     • COLONOSCOPY W/ POLYPECTOMY     • HAND SURGERY         Therapy Treatment    Rehabilitation Treatment Summary     Row Name 18 0937             Treatment Time/Intention    Discipline  occupational therapy assistant  -CS      Document Type  therapy note (daily note)  -CS      Subjective  Information  no complaints  -CS      Mode of Treatment  occupational therapy  -CS      Therapy Frequency (OT Eval)  daily  -CS      Patient Effort  good  -CS      Existing Precautions/Restrictions  weight bearing TDWB on L LE, [place]  -CS      Recorded by [CS] Mary Kauffman COTA/L 11/23/18 1139      Row Name 11/23/18 0937             Vital Signs    Pre Systolic BP Rehab  134  -CS      Pre Treatment Diastolic BP  66  -CS      Post Systolic BP Rehab  169  -CS      Post Treatment Diastolic BP  77  -CS      Pretreatment Heart Rate (beats/min)  97  -CS      Posttreatment Heart Rate (beats/min)  104  -CS      Pre SpO2 (%)  94  -CS      O2 Delivery Pre Treatment  room air  -CS      Post SpO2 (%)  94  -CS      O2 Delivery Post Treatment  room air  -CS      Pre Patient Position  Sitting  -CS      Post Patient Position  Supine  -CS      Recorded by [CS] Mary Kauffman COTA/L 11/23/18 1139      Row Name 11/23/18 0937             Cognitive Assessment/Intervention- PT/OT    Orientation Status (Cognition)  oriented x 4  -CS      Follows Commands (Cognition)  WNL  -CS      Recorded by [CS] Mary Kauffman COTA/L 11/23/18 1139      Row Name 11/23/18 0937             Bed Mobility Assessment/Treatment    Bed Mobility Assessment/Treatment  sit-supine  -CS      Sit-Supine [place] (Bed Mobility)  minimum assist (75% patient effort);moderate assist (50% patient effort)  -CS      Recorded by [CS] Mary Kauffman COTA/L 11/23/18 1139      Row Name 11/23/18 0937             Functional Mobility    Functional Mobility- Ind. Level  contact guard assist;minimum assist (75% patient effort)  -CS      Functional Mobility- Device  rolling walker  -CS      Functional Mobility-Distance (Feet)  15  -CS      Recorded by [CS] Mary Kauffman COTA/L 11/23/18 1139      Row Name 11/23/18 0937             Transfer Assessment/Treatment    Transfer Assessment/Treatment  sit-stand transfer;stand-sit transfer;chair-bed transfer;toilet  transfer  -CS      Recorded by [CS] Mary Kauffman COTA/L 11/23/18 1139      Row Name 11/23/18 0937             Chair-Bed Transfer    Chair-Bed [place] (Transfers)  contact guard;minimum assist (75% patient effort)  -CS      Assistive Device (Chair-Bed Transfers)  walker, front-wheeled  -CS      Recorded by [CS] Mary Kauffman MONTES/L 11/23/18 1139      Row Name 11/23/18 0937             Sit-Stand Transfer    Sit-Stand [place] (Transfers)  minimum assist (75% patient effort)  -CS      Assistive Device (Sit-Stand Transfers)  walker, front-wheeled  -CS      Recorded by [CS] Mary Kauffman MONTES/L 11/23/18 1139      Row Name 11/23/18 0937             Stand-Sit Transfer    Stand-Sit [place] (Transfers)  minimum assist (75% patient effort)  -CS      Assistive Device (Stand-Sit Transfers)  walker, front-wheeled  -CS      Recorded by [CS] Mary Kauffman MONTES/L 11/23/18 1139      Row Name 11/23/18 0937             Toilet Transfer    Type (Toilet Transfer)  sit-stand;stand-sit  -CS      [place] Level (Toilet Transfer)  minimum assist (75% patient effort);moderate assist (50% patient effort)  -CS      Assistive Device (Toilet Transfer)  walker, front-wheeled  -CS      Recorded by [CS] Mary Kauffman MONTES/L 11/23/18 1139      Row Name 11/23/18 0937             ADL Assessment/Intervention    BADL Assessment/Intervention  grooming;toileting;bathing;upper body dressing  -CS      Recorded by [CS] Mary Kauffman COTA/L 11/23/18 1139      Row Name 11/23/18 0937             Bathing Assessment/Intervention    Comment (Bathing)  Pt stated that he already had a bath this am.  -CS      Recorded by [CS] Mary Kauffman COTA/L 11/23/18 1139      Row Name 11/23/18 0937             Upper Body Dressing Assessment/Training    Upper Body Dressing [place] Level  doff;don;minimum assist (75% patient effort) hospital gown  -CS      Upper Body Dressing Position  supported sitting  -CS      Recorded  by [CS] Mary Kauffman COTA/L 11/23/18 1139      Row Name 11/23/18 0937             Lower Body Dressing Assessment/Training    Lower Body Dressing [place] Level  doff;don;socks;maximum assist (25% patient effort)  -CS      Lower Body Dressing Position  supported sitting  -CS      Recorded by [CS] Mary Kauffman COTA/L 11/23/18 1139      Row Name 11/23/18 0937             Grooming Assessment/Training    [place] Level (Grooming)  grooming skills;hair care, combing/brushing;wash face, hands;set up  -CS      Grooming Position  supported sitting  -CS      Recorded by [CS] Mary Kauffman COTA/L 11/23/18 1139      Row Name 11/23/18 0937             Toileting Assessment/Training    [place] Level (Toileting)  toileting skills;perform perineal hygiene;supervision  -CS      Assistive Devices (Toileting)  dressing stick  -CS      Toileting Position  supported sitting  -CS      Recorded by [CS] Mary Kauffman COTA/L 11/23/18 1139      Row Name 11/23/18 0937             Therapeutic Exercise    Upper Extremity (Therapeutic Exercise)  bicep curl, bilateral  -CS      Upper Extremity Range of Motion (Therapeutic Exercise)  shoulder flexion/extension, bilateral;forearm supination/pronation, bilateral;wrist flexion/extension, bilateral;elbow flexion/extension, bilateral  -CS      Weight/Resistance (Therapeutic Exercise)  1 pound;2 pounds  -CS      Exercise Type (Therapeutic Exercise)  AROM (active range of motion)  -CS      Position (Therapeutic Exercise)  seated  -CS      Sets/Reps (Therapeutic Exercise)  1/20  -CS      Equipment (Therapeutic Exercise)  free weight, barbell  -CS      Expected Outcome (Therapeutic Exercise)  improve functional tolerance, self-care activity;improve object manipulation, self-care activity;improve performance, transfer skills  -CS      Recorded by [CS] Mary Kauffman COTA/L 11/23/18 1139      Row Name 11/23/18 0937             Positioning and Restraints    Pre-Treatment  Position  sitting in chair/recliner  -CS      Post Treatment Position  bed  -CS      In Bed  supine;call light within reach;encouraged to call for assist;exit alarm on  -CS      Recorded by [CS] Mary Kauffman COTA/L 11/23/18 1139      Row Name 11/23/18 0937             Pain Scale: Numbers Pre/Post-Treatment    Pain Scale: Numbers, Pretreatment  2/10  -CS      Pain Scale: Numbers, Post-Treatment  1/10  -CS      Pain Location - Side  Left  -CS      Pain Location - Orientation  posterior  -CS      Pain Location  hip  -CS      Recorded by [CS] Mary Kauffman COTA/L 11/23/18 1139      Row Name                Wound 11/21/18 1538 Left hip incision;surgical    Wound - Properties Group Date first assessed: 11/21/18 [KM] Time first assessed: 1538 [KM] Present On Admission : no [KM] Side: Left [KM] Location: hip [KM] Type: incision;surgical [KM] Recorded by:  [KM] Martha Juan RN 11/21/18 1538    Row Name 11/23/18 0937             Outcome Summary/Treatment Plan (OT)    Daily Summary of Progress (OT)  progress toward functional goals is good  -CS      Plan for Continued Treatment (OT)  cont OT POC  -CS      Anticipated Discharge Disposition (OT)  home with home health;home with assist  -CS      Recorded by [CS] Mary Kauffman COTA/L 11/23/18 1139        User Key  (r) = Recorded By, (t) = Taken By, (c) = Cosigned By    Initials Name Effective Dates Discipline    CS Mary Kauffman COTA/L 03/07/18 -  OT    KM Martha Juan RN 05/21/18 -  Nurse        Wound 11/21/18 1538 Left hip incision;surgical (Active)   Dressing Appearance open to air 11/23/2018  7:10 AM   Closure [place] 11/23/2018  7:10 AM   Periwound blistered 11/23/2018  7:10 AM   Dressing Care, Wound dressing removed 11/23/2018  5:00 AM     OT Rehab Goals     Row Name 11/23/18 0937             Bed Mobility Goal 1 (OT)    Activity/Assistive Device (Bed Mobility Goal 1, OT)  bed mobility activities, all  -CS      [place] Level/Cues Needed (Bed  Mobility Goal 1, OT)  contact guard assist  -CS      Time Frame (Bed Mobility Goal 1, OT)  long term goal (LTG);by discharge  -CS      Progress/Outcomes (Bed Mobility Goal 1, OT)  goal not met  -CS         Transfer Goal 1 (OT)    Activity/Assistive Device (Transfer Goal 1, OT)  transfers, all  -CS      [place] Level/Cues Needed (Transfer Goal 1, OT)  standby assist  -CS      Time Frame (Transfer Goal 1, OT)  long term goal (LTG);by discharge  -CS      Progress/Outcome (Transfer Goal 1, OT)  goal not met  -CS         Bathing Goal 1 (OT)    Activity/Assistive Device (Bathing Goal 1, OT)  bathing skills, all  -CS      [place] Level/Cues Needed (Bathing Goal 1, OT)  contact guard assist  -CS      Time Frame (Bathing Goal 1, OT)  long term goal (LTG);by discharge  -CS      Progress/Outcomes (Bathing Goal 1, OT)  goal not met  -CS         Dressing Goal 1 (OT)    Activity/Assistive Device (Dressing Goal 1, OT)  dressing skills, all  -CS      [place]/Cues Needed (Dressing Goal 1, OT)  contact guard assist  -CS      Time Frame (Dressing Goal 1, OT)  long term goal (LTG);by discharge  -CS      Progress/Outcome (Dressing Goal 1, OT)  goal not met  -CS         Patient Education Goal (OT)    Activity (Patient Education Goal, OT)  Independent with TDWB to L LE and home safety/fall prev.  -CS      [place]/Cues/Accuracy (Memory Goal 2, OT)  demonstrates adequately;verbalizes understanding  -CS      Time Frame (Patient Education Goal, OT)  long term goal (LTG);by discharge  -CS      Progress/Outcome (Patient Education Goal, OT)  goal not met  -CS        User Key  (r) = Recorded By, (t) = Taken By, (c) = Cosigned By    Initials Name Provider Type Discipline    CS Mary Kauffman COTA/BARRERA Occupational Therapy Assistant OT        Occupational Therapy Education     Title: PT OT SLP Therapies (Active)     Topic: Occupational Therapy (Active)     Point: ADL training (Active)     Description: Instruct learner(s) on  proper safety adaptation and remediation techniques during self care or transfers.   Instruct in proper use of assistive devices.    Learning Progress Summary           Patient Acceptance, E,TB,D, NR by CS at 11/23/2018 11:40 AM                   Point: Home exercise program (Active)     Description: Instruct learner(s) on appropriate technique for monitoring, assisting and/or progressing therapeutic exercises/activities.    Learning Progress Summary           Patient Acceptance, E,TB,D, NR by CS at 11/23/2018 11:40 AM                   Point: Precautions (Active)     Description: Instruct learner(s) on prescribed precautions during self-care and functional transfers.    Learning Progress Summary           Patient Acceptance, E,TB,D, NR by CS at 11/23/2018 11:40 AM    Acceptance, E, VU,NR by  at 11/22/2018 12:49 PM    Comment:  Edu on use of gait belt and non skid socks when OOB and no OOB without assist.                   Point: Body mechanics (Active)     Description: Instruct learner(s) on proper positioning and spine alignment during self-care, functional mobility activities and/or exercises.    Learning Progress Summary           Patient Acceptance, E,TB,D, NR by  at 11/23/2018 11:40 AM                               User Key     Initials Effective Dates Name Provider Type Discipline     06/15/16 -  Tayo Davis OT Occupational Therapist OT     03/07/18 -  Mary Kauffman COTA/BARRERA Occupational Therapy Assistant OT                OT Recommendation and Plan  Outcome Summary/Treatment Plan (OT)  Daily Summary of Progress (OT): progress toward functional goals is good  Plan for Continued Treatment (OT): cont OT POC  Anticipated Discharge Disposition (OT): home with home health, home with assist  Therapy Frequency (OT Eval): daily  Daily Summary of Progress (OT): progress toward functional goals is good  Plan of Care Review  Plan of Care Reviewed With: patient  Plan of Care Reviewed With: patient  Outcome  Summary: Pt tolerated tx well this date. Pt was min A with sit-stand t/f. Pt was CGA/min A with chair-bed t/f. Pt was min/mod A with toilet t/f. Continue OT POC.  Outcome Measures     Row Name 11/23/18 1100 11/22/18 1038 11/22/18 0824       How much help from another person do you currently need...    Turning from your back to your side while in flat bed without using bedrails?  --  --  3  -BS    Moving from lying on back to sitting on the side of a flat bed without bedrails?  --  --  2  -BS    Moving to and from a bed to a chair (including a wheelchair)?  --  --  3  -BS    Standing up from a chair using your arms (e.g., wheelchair, bedside chair)?  --  --  3  -BS    Climbing 3-5 steps with a railing?  --  --  2  -BS    To walk in hospital room?  --  --  2  -BS    AM-PAC 6 Clicks Score  --  --  15  -BS       How much help from another is currently needed...    Putting on and taking off regular lower body clothing?  2  -CS  2  -RB  --    Bathing (including washing, rinsing, and drying)  2  -CS  2  -RB  --    Toileting (which includes using toilet bed pan or urinal)  2  -CS  2  -RB  --    Putting on and taking off regular upper body clothing  3  -CS  3  -RB  --    Taking care of personal grooming (such as brushing teeth)  4  -CS  4  -RB  --    Eating meals  4  -CS  4  -RB  --    Score  17  -CS  17  -RB  --       Functional Assessment    Outcome Measure Options  AM-PAC 6 Clicks Daily Activity (OT)  -CS  AM-PAC 6 Clicks Daily Activity (OT)  -RB  AM-PAC 6 Clicks Basic Mobility (PT)  -BS      User Key  (r) = Recorded By, (t) = Taken By, (c) = Cosigned By    Initials Name Provider Type    RB Tayo Davis, OT Occupational Therapist    CS Mary Kauffman COTA/BARRERA Occupational Therapy Assistant    BS Tulio Duong, PT Physical Therapist           Time Calculation:   Time Calculation- OT     Row Name 11/23/18 1252             Time Calculation- OT    OT Start Time  0937  -CS      OT Stop Time  1026  -CS      OT Time  Calculation (min)  49 min  -CS      Total Timed Code Minutes- OT  49 minute(s)  -CS      OT Received On  11/23/18  -CS        User Key  (r) = Recorded By, (t) = Taken By, (c) = Cosigned By    Initials Name Provider Type    CS Mary Kauffman COTA/BARRERA Occupational Therapy Assistant           Therapy Suggested Charges     Code   Minutes Charges    None           Therapy Charges for Today     Code Description Service Date Service Provider Modifiers Qty    01553904712 HC OT SELF CARE/MGMT/TRAIN EA 15 MIN 11/23/2018 Mary Kauffman COTA/L GO 1    09329314384 HC OT THER PROC EA 15 MIN 11/23/2018 Mary Kauffman COTA/L GO 1    30610365652 HC OT THERAPEUTIC ACT EA 15 MIN 11/23/2018 Mary Kauffman COTA/L GO 1          OT G-codes  OT Professional Judgement Used?: Yes  OT Functional Scales Options: AM-PAC 6 Clicks Daily Activity (OT)  Score: 17  Functional Limitation: Self care  Self Care Current Status (): At least 40 percent but less than 60 percent impaired, limited or restricted  Self Care Goal Status (): At least 20 percent but less than 40 percent impaired, limited or restricted    BRITTNEY Calix  11/23/2018

## 2018-11-23 NOTE — THERAPY TREATMENT NOTE
Acute Care - Physical Therapy Treatment Note  [facility]     Patient Name: Maciej Funk  : 1935  MRN: [id number]  Today's Date: 2018  Onset of Illness/Injury or Date of Surgery: 18  Date of Referral to PT: 18  Referring Physician: TRESA Villatoro MD.    Admit Date: 2018    Visit Dx:    ICD-10-CM ICD-9-CM   1. Closed displaced intertrochanteric fracture of left femur, initial encounter (CMS/HCC) S72.142A 820.21   2. Mass of right lung R91.8 786.6   3. Closed displaced subtrochanteric fracture of left femur, initial encounter (CMS/HCC) S72.22XA 820.22   4. Syncope, unspecified syncope type R55 780.2   5. Impaired functional mobility and activity tolerance Z74.09 V49.89   6. Impaired mobility and activities of daily living Z74.09 799.89     Patient Active Problem List   Diagnosis   • Hypertension   • History of adenomatous polyp of colon   • Hyperlipidemia   • Tobacco dependence syndrome   • Nicotine dependence, cigarettes, with other nicotine-induced disorders   • Carotid stenosis, bilateral   • Optic atrophy   • Nonexudative age-related macular degeneration   • Nuclear cataract   • Follow-up surgery care   • Closed displaced intertrochanteric fracture of left femur (CMS/[facility])   • Pulmonary hypertension (CMS/[facility])   • Emphysema of lung (CMS/[facility])   • Atherosclerosis       Therapy Treatment    Rehabilitation Treatment Summary     Row Name 18 1535 18 1029 18 0937       Treatment Time/Intention    Discipline  physical therapy assistant  -TA  physical therapy assistant  -TA  occupational therapy assistant  -CS    Document Type  therapy note (daily note)  -TA  therapy note (daily note)  -TA  therapy note (daily note)  -CS    Subjective Information  no complaints  -TA  no complaints  -TA  no complaints  -CS    Mode of Treatment  individual therapy;physical therapy  -TA  individual therapy;physical therapy  -TA  occupational therapy  -CS    Therapy Frequency (PT  Clinical Impression)  2 times/day  -TA  2 times/day  -TA  --    Therapy Frequency (OT Eval)  --  --  daily  -CS    Patient Effort  good  -TA  good  -TA  good  -CS    Existing Precautions/Restrictions  weight bearing TDWB on L LE, [place]  -TA  weight bearing TDWB on L LE, [place]  -TA  weight bearing TDWB on L LE, [place]  -CS    Recorded by [TA] Olesya Gutierres, PTA 11/23/18 1623 [TA] Olesya Gutierres, PTA 11/23/18 1324 [CS] Mary Kauffman MONTES/L 11/23/18 1139    Row Name 11/23/18 1535 11/23/18 1029 11/23/18 0937       Vital Signs    Pre Systolic BP Rehab  132  -TA  169  -TA  134  -CS    Pre Treatment Diastolic BP  70  -TA  77  -TA  66  -CS    Post Systolic BP Rehab  --  --  169  -CS    Post Treatment Diastolic BP  --  --  77  -CS    Pretreatment Heart Rate (beats/min)  98  -TA  104  -TA  97  -CS    Intratreatment Heart Rate (beats/min)  111  -TA  107  -TA  --    Posttreatment Heart Rate (beats/min)  106  -TA  100  -TA  104  -CS    Pre SpO2 (%)  93  -TA  94  -TA  94  -CS    O2 Delivery Pre Treatment  room air  -TA  room air  -TA  room air  -CS    Intra SpO2 (%)  96  -TA  97  -TA  --    O2 Delivery Intra Treatment  room air  -TA  room air  -TA  --    Post SpO2 (%)  96  -TA  96  -TA  94  -CS    O2 Delivery Post Treatment  room air  -TA  room air  -TA  room air  -CS    Pre Patient Position  Supine  -TA  Supine  -TA  Sitting  -CS    Post Patient Position  Supine  -TA  Supine  -TA  Supine  -CS    Recorded by [TA] Olesya Gutierres, PTA 11/23/18 1623 [TA] Olesya Gutierres, PTA 11/23/18 1324 [CS] Mary Kauffman MONTES/L 11/23/18 1139    Row Name 11/23/18 1535 11/23/18 1029 11/23/18 0937       Cognitive Assessment/Intervention- PT/OT    Affect/Mental Status (Cognitive)  WNL  -TA  WNL  -TA  --    Orientation Status (Cognition)  oriented x 4  -TA  oriented x 4  -TA  oriented x 4  -CS    Follows Commands (Cognition)  WNL  -TA  WNL  -TA  WNL  -CS    Personal Safety Interventions  fall prevention program maintained;gait belt;nonskid  shoes/slippers when out of bed;supervised activity  -TA  fall prevention program maintained;gait belt;nonskid shoes/slippers when out of bed;supervised activity  -TA  --    Recorded by [TA] Olesya Gutierres, PTA 11/23/18 1623 [TA] Olesya Gutierres, PTA 11/23/18 1324 [CS] Mary Kauffman, MONTES/L 11/23/18 1139    Row Name 11/23/18 1535 11/23/18 1029          Mobility Assessment/Intervention    Extremity Weight-bearing Status  left lower extremity  -TA  left lower extremity  -TA     Left Lower Extremity (Weight-bearing Status)  touch down weight-bearing (TDWB)  (Significant)   -TA  touch down weight-bearing (TDWB)  (Significant)   -TA     Recorded by [TA] Olesya Gutierres, PTA 11/23/18 1623 [TA] Olesya Gutierres, PTA 11/23/18 1324     Row Name 11/23/18 1535 11/23/18 1029 11/23/18 0937       Bed Mobility Assessment/Treatment    Bed Mobility Assessment/Treatment  sit-supine;supine-sit  -TA  sit-supine;supine-sit  -TA  sit-supine  -CS    Supine-Sit [place] (Bed Mobility)  minimum assist (75% patient effort)  -TA  minimum assist (75% patient effort)  -TA  --    Sit-Supine [place] (Bed Mobility)  minimum assist (75% patient effort)  -TA  minimum assist (75% patient effort)  -TA  minimum assist (75% patient effort);moderate assist (50% patient effort)  -CS    Bed Mobility, Safety Issues  decreased use of legs for bridging/pushing  -TA  decreased use of legs for bridging/pushing  -TA  --    Assistive Device (Bed Mobility)  bed rails;head of bed elevated  -TA  bed rails;head of bed elevated  -TA  --    Recorded by [TA] Olesya Gutierres, PTA 11/23/18 1623 [TA] Olesya Gtuierres, PTA 11/23/18 1324 [CS] Mary Kauffman, MONTES/L 11/23/18 1139    Row Name 11/23/18 1029 11/23/18 0937          Functional Mobility    Functional Mobility- Ind. Level  minimum assist (75% patient effort)  -TA  contact guard assist;minimum assist (75% patient effort)  -CS     Functional Mobility- Device  rolling walker  -TA  rolling walker  -CS      Functional Mobility-Distance (Feet)  --  15  -CS     Functional Mobility-Maintain WBing  able to maintain weight bearing status  -TA  --     Recorded by [TA] Olesya Gutierres, PTA 11/23/18 1324 [CS] Mary Kauffman MONTES/L 11/23/18 1139     Row Name 11/23/18 1535 11/23/18 1029 11/23/18 0937       Transfer Assessment/Treatment    Transfer Assessment/Treatment  sit-stand transfer  -TA  sit-stand transfer  -TA  sit-stand transfer;stand-sit transfer;chair-bed transfer;toilet transfer  -CS    Maintains Weight-bearing Status (Transfers)  able to maintain  -TA  --  --    Recorded by [TA] Olesya Gutierres, PTA 11/23/18 1623 [TA] Olesya Gutierres, PTA 11/23/18 1324 [CS] Mary Kauffman MONTES/L 11/23/18 1139    Row Name 11/23/18 0937             Chair-Bed Transfer    Chair-Bed [place] (Transfers)  contact guard;minimum assist (75% patient effort)  -CS      Assistive Device (Chair-Bed Transfers)  walker, front-wheeled  -CS      Recorded by [CS] Mary Kauffman MONTES/L 11/23/18 1139      Row Name 11/23/18 1535 11/23/18 1029 11/23/18 0937       Sit-Stand Transfer    Sit-Stand [place] (Transfers)  minimum assist (75% patient effort)  -TA  minimum assist (75% patient effort)  -TA  minimum assist (75% patient effort)  -CS    Assistive Device (Sit-Stand Transfers)  walker, front-wheeled  -TA  walker, front-wheeled  -TA  walker, front-wheeled  -CS    Recorded by [TA] Olesya Gutierres, PTA 11/23/18 1623 [TA] Olesya Gutierres, PTA 11/23/18 1324 [CS] Mary Kauffman MONTES/L 11/23/18 1139    Row Name 11/23/18 1535 11/23/18 1029 11/23/18 0937       Stand-Sit Transfer    Stand-Sit [place] (Transfers)  minimum assist (75% patient effort)  -TA  minimum assist (75% patient effort)  -TA  minimum assist (75% patient effort)  -CS    Assistive Device (Stand-Sit Transfers)  walker, front-wheeled  -TA  walker, front-wheeled  -TA  walker, front-wheeled  -CS    Recorded by [TA] Olesya Gutierres, PTA 11/23/18 6268 [TA] Olesya Gutierres  MARION, PTA 11/23/18 1324 [CS] Mary Kauffman MONTES/L 11/23/18 1139    Row Name 11/23/18 0937             Toilet Transfer    Type (Toilet Transfer)  sit-stand;stand-sit  -CS      [place] Level (Toilet Transfer)  minimum assist (75% patient effort);moderate assist (50% patient effort)  -CS      Assistive Device (Toilet Transfer)  walker, front-wheeled  -CS      Recorded by [CS] Mary Kauffman COTA/L 11/23/18 1139      Row Name 11/23/18 1535 11/23/18 1029          Gait/Stairs Assessment/Training    [place] Level (Gait)  minimum assist (75% patient effort)  -TA  minimum assist (75% patient effort)  -TA     Assistive Device (Gait)  walker, front-wheeled  -TA  walker, front-wheeled  -TA     Distance in Feet (Gait)  40  -TA  3 >HOB  -TA     Recorded by [TA] Olesya Gutierres, PTA 11/23/18 1623 [TA] Olesya Gutierres, PTA 11/23/18 1324     Row Name 11/23/18 0937             ADL Assessment/Intervention    BADL Assessment/Intervention  grooming;toileting;bathing;upper body dressing  -CS      Recorded by [CS] Mary Kauffman COTA/L 11/23/18 1139      Row Name 11/23/18 0937             Bathing Assessment/Intervention    Comment (Bathing)  Pt stated that he already had a bath this am.  -CS      Recorded by [CS] Mary Kauffman COTA/L 11/23/18 1139      Row Name 11/23/18 0937             Upper Body Dressing Assessment/Training    Upper Body Dressing [place] Level  doff;don;minimum assist (75% patient effort) hospital gown  -CS      Upper Body Dressing Position  supported sitting  -CS      Recorded by [CS] Mary Kauffman COTA/L 11/23/18 1139      Row Name 11/23/18 0937             Lower Body Dressing Assessment/Training    Lower Body Dressing [place] Level  doff;don;socks;maximum assist (25% patient effort)  -CS      Lower Body Dressing Position  supported sitting  -CS      Recorded by [CS] Mary Kauffman COTA/L 11/23/18 1139      Row Name 11/23/18 0937             Grooming Assessment/Training     [place] Level (Grooming)  grooming skills;hair care, combing/brushing;wash face, hands;set up  -CS      Grooming Position  supported sitting  -CS      Recorded by [CS] Mary Kauffman COTA/L 11/23/18 1139      Row Name 11/23/18 0937             Toileting Assessment/Training    [place] Level (Toileting)  toileting skills;perform perineal hygiene;supervision  -CS      Assistive Devices (Toileting)  dressing stick  -CS      Toileting Position  supported sitting  -CS      Recorded by [CS] Mary Kauffman COTA/L 11/23/18 1139      Row Name 11/23/18 1029 11/23/18 0937          Therapeutic Exercise    Upper Extremity (Therapeutic Exercise)  --  bicep curl, bilateral  -CS     Upper Extremity Range of Motion (Therapeutic Exercise)  --  shoulder flexion/extension, bilateral;forearm supination/pronation, bilateral;wrist flexion/extension, bilateral;elbow flexion/extension, bilateral  -CS     Lower Extremity (Therapeutic Exercise)  gluteal sets;quad sets, bilateral;heel slides, bilateral;LAQ (long arc quad), bilateral  -TA  --     Lower Extremity Range of Motion (Therapeutic Exercise)  ankle dorsiflexion/plantar flexion, bilateral  -TA  --     Weight/Resistance (Therapeutic Exercise)  --  1 pound;2 pounds  -CS     Exercise Type (Therapeutic Exercise)  AROM (active range of motion)  -TA  AROM (active range of motion)  -CS     Position (Therapeutic Exercise)  supine;seated  -TA  seated  -CS     Sets/Reps (Therapeutic Exercise)  20  -TA  1/20  -CS     Equipment (Therapeutic Exercise)  --  free weight, barbell  -CS     Expected Outcome (Therapeutic Exercise)  improve functional stability;improve functional tolerance, household activity;improve functional tolerance, self-care activity;improve performance, gait skills;improve performance, transfer skills  -TA  improve functional tolerance, self-care activity;improve object manipulation, self-care activity;improve performance, transfer skills  -CS     Recorded by [TA]  Olesya Gutierres, PTA 11/23/18 1324 [CS] Mary Kauffman MONTES/L 11/23/18 1139     Row Name 11/23/18 1535 11/23/18 1029 11/23/18 0937       Positioning and Restraints    Pre-Treatment Position  in bed  -TA  in bed  -TA  sitting in chair/recliner  -CS    Post Treatment Position  bed  -TA  bed  -TA  bed  -CS    In Bed  supine;call light within reach;exit alarm on  -TA  supine;call light within reach;exit alarm on;with family/caregiver  -TA  supine;call light within reach;encouraged to call for assist;exit alarm on  -CS    Recorded by [TA] Olesya Gutierres, PTA 11/23/18 1623 [TA] Olesya Gutierres, PTA 11/23/18 1324 [CS] Mary Kauffman MONTES/L 11/23/18 1139    Row Name 11/23/18 1535 11/23/18 1029 11/23/18 0937       Pain Scale: Numbers Pre/Post-Treatment    Pain Scale: Numbers, Pretreatment  0/10 - no pain  -TA  0/10 - no pain  -TA  2/10  -CS    Pain Scale: Numbers, Post-Treatment  0/10 - no pain  -TA  0/10 - no pain  -TA  1/10  -CS    Pain Location - Side  --  --  -TA  Left  -CS    Pain Location - Orientation  --  --  -TA  posterior  -CS    Pain Location  --  --  -TA  hip  -CS    Recorded by [TA] Olesya Gutierres, PTA 11/23/18 1623 [TA] Olesya Gutierres, PTA 11/23/18 1324 [CS] Mary Kauffman, MONTES/L 11/23/18 1139    Row Name                Wound 11/21/18 1538 Left hip incision;surgical    Wound - Properties Group Date first assessed: 11/21/18 [KM] Time first assessed: 1538 [KM] Present On Admission : no [KM] Side: Left [KM] Location: hip [KM] Type: incision;surgical [KM] Recorded by:  [KM] Martha Juan RN 11/21/18 1538    Row Name 11/23/18 0937             Outcome Summary/Treatment Plan (OT)    Daily Summary of Progress (OT)  progress toward functional goals is good  -CS      Plan for Continued Treatment (OT)  cont OT POC  -CS      Anticipated Discharge Disposition (OT)  home with home health;home with assist  -CS      Recorded by [CS] Mary Kauffman COTA/L 11/23/18 1139      Row Name 11/23/18 1535 11/23/18  1029          Outcome Summary/Treatment Plan (PT)    Daily Summary of Progress (PT)  progress toward functional goals is good  -TA  progress toward functional goals as expected  -TA     Plan for Continued Treatment (PT)  continue  -TA  continue  -TA     Anticipated Discharge Disposition (PT)  home with assist;home with home health;inpatient rehabilitation facility  -TA  home with assist;home with home health;inpatient rehabilitation facility  -TA     Recorded by [TA] Olesya Gutierres, PTA 11/23/18 1623 [TA] Olesya Gutierres, PTA 11/23/18 1324       User Key  (r) = Recorded By, (t) = Taken By, (c) = Cosigned By    Initials Name Effective Dates Discipline    TA Olesya Gutierres, PTA 03/07/18 -  PT    Mary Sotomayor, MONTES/L 03/07/18 -  OT    KM Martha Juan RN 05/21/18 -  Nurse          Wound 11/21/18 1538 Left hip incision;surgical (Active)   Dressing Appearance open to air 11/23/2018  7:10 AM   Closure [place] 11/23/2018  7:10 AM   Periwound blistered 11/23/2018  7:10 AM   Dressing Care, Wound dressing removed 11/23/2018  5:00 AM       PT Rehab Goals     Row Name 11/23/18 1535 11/23/18 1029          Bed Mobility Goal 1 (PT)    Activity/Assistive Device (Bed Mobility Goal 1, PT)  sit to supine/supine to sit  -TA  sit to supine/supine to sit  -TA     [place] Level/Cues Needed (Bed Mobility Goal 1, PT)  standby assist  -TA  standby assist  -TA     Time Frame (Bed Mobility Goal 1, PT)  by discharge  -TA  by discharge  -TA     Barriers (Bed Mobility Goal 1, PT)  pain  -TA  pain  -TA     Progress/Outcomes (Bed Mobility Goal 1, PT)  goal not met  -TA  goal not met  -TA        Transfer Goal 1 (PT)    Activity/Assistive Device (Transfer Goal 1, PT)  sit-to-stand/stand-to-sit  -TA  sit-to-stand/stand-to-sit  -TA     [place] Level/Cues Needed (Transfer Goal 1, PT)  standby assist  -TA  standby assist  -TA     Time Frame (Transfer Goal 1, PT)  by discharge  -TA  by discharge  -TA     Barriers (Transfers Goal 1,  PT)  pain, wt bearing restrictions  -TA  pain, wt bearing restrictions  -TA     Progress/Outcome (Transfer Goal 1, PT)  goal not met  -TA  goal not met  -TA        Gait Training Goal 1 (PT)    Activity/Assistive Device (Gait Training Goal 1, PT)  walker, rolling  -TA  walker, rolling  -TA     [place] Level (Gait Training Goal 1, PT)  standby assist  -TA  standby assist  -TA     Distance (Gait Goal 1, PT)  10 ft x 1 maintaining TDWB on L LE consistently  -TA  10 ft x 1 maintaining TDWB on L LE consistently  -TA     Time Frame (Gait Training Goal 1, PT)  by discharge  -TA  by discharge  -TA     Barriers (Gait Training Goal 1, PT)  pain, fatigue, impaired standing balance  -TA  pain, fatigue, impaired standing balance  -TA     Progress/Outcome (Gait Training Goal 1, PT)  goal not met  -TA  goal not met  -TA        Stairs Goal 1 (PT)    Activity/Assistive Device (Stairs Goal 1, PT)  step-to-step;ascending stairs;descending stairs  -TA  step-to-step;ascending stairs;descending stairs  -TA     [place] Level/Cues Needed (Stairs Goal 1, PT)  contact guard assist  -TA  contact guard assist  -TA     Number of Stairs (Stairs Goal 1, PT)  2  -TA  2  -TA     Time Frame (Stairs Goal 1, PT)  by discharge  -TA  by discharge  -TA     Barriers (Stairs Goal 1, PT)  TDWB on L LE, pain  -TA  TDWB on L LE, pain  -TA     Progress/Outcome (Stairs Goal 1, PT)  goal not met  -TA  goal not met  -TA       User Key  (r) = Recorded By, (t) = Taken By, (c) = Cosigned By    Initials Name Provider Type Discipline    Olesya Romero PTA Physical Therapy Assistant PT          Physical Therapy Education     Title: PT OT SLP Therapies (Active)     Topic: Physical Therapy (Active)     Point: Mobility training (Active)     Learning Progress Summary           Patient Acceptance, D, NR by BS at 11/22/2018 12:00 PM    Comment:  vc's and tc's for correctly executing TDWB on L LE upon assuming standing, as well as cues for scooting self to the  HOB.                   Point: Home exercise program (Active)     Learning Progress Summary           Patient Acceptance, D, NR by BS at 11/22/2018 12:00 PM    Comment:  vc's and tc's for correctly executing TDWB on L LE upon assuming standing, as well as cues for scooting self to the HOB.                   Point: Body mechanics (Active)     Learning Progress Summary           Patient Acceptance, D, NR by BS at 11/22/2018 12:00 PM    Comment:  vc's and tc's for correctly executing TDWB on L LE upon assuming standing, as well as cues for scooting self to the HOB.                   Point: Precautions (Active)     Learning Progress Summary           Patient Acceptance, D, NR by BS at 11/22/2018 12:00 PM    Comment:  vc's and tc's for correctly executing TDWB on L LE upon assuming standing, as well as cues for scooting self to the HOB.                               User Key     Initials Effective Dates Name Provider Type Discipline     04/08/18 -  Tulio Duong, PT Physical Therapist PT                PT Recommendation and Plan  Anticipated Discharge Disposition (PT): home with assist, home with home health, inpatient rehabilitation facility  Therapy Frequency (PT Clinical Impression): 2 times/day  Outcome Summary/Treatment Plan (PT)  Daily Summary of Progress (PT): progress toward functional goals is good  Plan for Continued Treatment (PT): continue  Anticipated Discharge Disposition (PT): home with assist, home with home health, inpatient rehabilitation facility  Progress: improving  Outcome Summary: pt sup<>sit with min assist, sit<>stand with min assist, pt ambulated 40` with RW & min assist, pt would benefit from home with 24/7 care & continued PT services  Outcome Measures     Row Name 11/23/18 1600 11/23/18 1300 11/23/18 1100       How much help from another person do you currently need...    Turning from your back to your side while in flat bed without using bedrails?  3  -TA  3  -TA  --    Moving from lying  on back to sitting on the side of a flat bed without bedrails?  3  -TA  3  -TA  --    Moving to and from a bed to a chair (including a wheelchair)?  3  -TA  3  -TA  --    Standing up from a chair using your arms (e.g., wheelchair, bedside chair)?  3  -TA  3  -TA  --    Climbing 3-5 steps with a railing?  2  -TA  2  -TA  --    To walk in hospital room?  3  -TA  2  -TA  --    AM-PAC 6 Clicks Score  17  -TA  16  -TA  --       How much help from another is currently needed...    Putting on and taking off regular lower body clothing?  --  --  2  -CS    Bathing (including washing, rinsing, and drying)  --  --  2  -CS    Toileting (which includes using toilet bed pan or urinal)  --  --  2  -CS    Putting on and taking off regular upper body clothing  --  --  3  -CS    Taking care of personal grooming (such as brushing teeth)  --  --  4  -CS    Eating meals  --  --  4  -CS    Score  --  --  17  -CS       Functional Assessment    Outcome Measure Options  AM-PAC 6 Clicks Basic Mobility (PT)  -TA  AM-PAC 6 Clicks Basic Mobility (PT)  -TA  AM-PAC 6 Clicks Daily Activity (OT)  -CS    Row Name 11/22/18 1038 11/22/18 0824          How much help from another person do you currently need...    Turning from your back to your side while in flat bed without using bedrails?  --  3  -BS     Moving from lying on back to sitting on the side of a flat bed without bedrails?  --  2  -BS     Moving to and from a bed to a chair (including a wheelchair)?  --  3  -BS     Standing up from a chair using your arms (e.g., wheelchair, bedside chair)?  --  3  -BS     Climbing 3-5 steps with a railing?  --  2  -BS     To walk in hospital room?  --  2  -BS     AM-PAC 6 Clicks Score  --  15  -BS        How much help from another is currently needed...    Putting on and taking off regular lower body clothing?  2  -RB  --     Bathing (including washing, rinsing, and drying)  2  -RB  --     Toileting (which includes using toilet bed pan or urinal)  2  -RB   --     Putting on and taking off regular upper body clothing  3  -RB  --     Taking care of personal grooming (such as brushing teeth)  4  -RB  --     Eating meals  4  -RB  --     Score  17  -RB  --        Functional Assessment    Outcome Measure Options  AM-PAC 6 Clicks Daily Activity (OT)  -RB  AM-PAC 6 Clicks Basic Mobility (PT)  -BS       User Key  (r) = Recorded By, (t) = Taken By, (c) = Cosigned By    Initials Name Provider Type    Tayo Kaplan, OT Occupational Therapist    Olesya Romero, PTA Physical Therapy Assistant    Mary Sotomayor, MONTES/BARRERA Occupational Therapy Assistant    BS Tulio Duong, PT Physical Therapist         Time Calculation:   PT Charges     Row Name 11/23/18 1625 11/23/18 1326          Time Calculation    Start Time  1535  -TA  1029  -TA     Stop Time  1558  -TA  1059  -TA     Time Calculation (min)  23 min  -TA  30 min  -TA        Time Calculation- PT    Total Timed Code Minutes- PT  23 minute(s)  -TA  30 minute(s)  -TA       User Key  (r) = Recorded By, (t) = Taken By, (c) = Cosigned By    Initials Name Provider Type    Olesya Romero, ELLI Physical Therapy Assistant        Therapy Suggested Charges     Code   Minutes Charges    88495 (CPT®) Hc Pt Neuromusc Re Education Ea 15 Min      15765 (CPT®) Hc Pt Ther Proc Ea 15 Min 15 1    94418 (CPT®) Hc Gait Training Ea 15 Min      41923 (CPT®) Hc Pt Therapeutic Act Ea 15 Min      02217 (CPT®) Hc Pt Manual Therapy Ea 15 Min      73222 (CPT®) Hc Pt Iontophoresis Ea 15 Min      84835 (CPT®) Hc Pt Elec Stim Ea-Per 15 Min      62288 (CPT®) Hc Pt Ultrasound Ea 15 Min      34693 (CPT®) Hc Pt Self Care/Mgmt/Train Ea 15 Min      04387 (CPT®) Hc Pt Prosthetic (S) Train Initial Encounter, Each 15 Min      07394 (CPT®) Hc Pt Orthotic(S)/Prosthetic(S) Encounter, Each 15 Min      96380 (CPT®) Hc Orthotic(S) Mgmt/Train Initial Encounter, Each 15min      Total  15 1        Therapy Charges for Today     Code Description Service Date Service  Provider Modifiers Qty    56628262197 HC PT THERAPEUTIC ACT EA 15 MIN 11/23/2018 Olesya Gutierres, PTA GP 1    01934502028 HC PT THER PROC EA 15 MIN 11/23/2018 Olesya Gutierres, PTA GP 1    41568562278 HC GAIT TRAINING EA 15 MIN 11/23/2018 Olesya Gutierres, PTA GP 1    86729486859 HC PT THERAPEUTIC ACT EA 15 MIN 11/23/2018 Olesya Gutierres, PTA GP 1          PT G-Codes  Outcome Measure Options: AM-PAC 6 Clicks Basic Mobility (PT)  AM-PAC 6 Clicks Score: 17  Score: 17  Functional Limitation: Mobility: Walking and moving around  Mobility: Walking and Moving Around Current Status (): At least 40 percent but less than 60 percent impaired, limited or restricted  Mobility: Walking and Moving Around Goal Status (): At least 20 percent but less than 40 percent impaired, limited or restricted    Olesya Gutierres PTA  11/23/2018

## 2018-11-24 VITALS
DIASTOLIC BLOOD PRESSURE: 80 MMHG | HEIGHT: 69 IN | WEIGHT: 175.3 LBS | BODY MASS INDEX: 25.96 KG/M2 | SYSTOLIC BLOOD PRESSURE: 148 MMHG | HEART RATE: 88 BPM | RESPIRATION RATE: 18 BRPM | TEMPERATURE: 98.6 F | OXYGEN SATURATION: 96 %

## 2018-11-24 LAB
ALBUMIN SERPL-MCNC: 2.6 G/DL (ref 3.4–4.8)
ALBUMIN/GLOB SERPL: 1 G/DL (ref 1.1–1.8)
ALP SERPL-CCNC: 56 U/L (ref 38–126)
ALT SERPL W P-5'-P-CCNC: 17 U/L (ref 21–72)
ANION GAP SERPL CALCULATED.3IONS-SCNC: 6 MMOL/L (ref 5–15)
AST SERPL-CCNC: 21 U/L (ref 17–59)
BASOPHILS # BLD AUTO: 0.02 10*3/MM3 (ref 0–0.2)
BASOPHILS NFR BLD AUTO: 0.3 % (ref 0–2)
BILIRUB SERPL-MCNC: 1.2 MG/DL (ref 0.2–1.3)
BUN BLD-MCNC: 13 MG/DL (ref 7–21)
BUN/CREAT SERPL: 15.5 (ref 7–25)
CALCIUM SPEC-SCNC: 7.9 MG/DL (ref 8.4–10.2)
CHLORIDE SERPL-SCNC: 103 MMOL/L (ref 95–110)
CO2 SERPL-SCNC: 23 MMOL/L (ref 22–31)
CREAT BLD-MCNC: 0.84 MG/DL (ref 0.7–1.3)
DEPRECATED RDW RBC AUTO: 38.8 FL (ref 35.1–43.9)
EOSINOPHIL # BLD AUTO: 0.16 10*3/MM3 (ref 0–0.7)
EOSINOPHIL NFR BLD AUTO: 2.4 % (ref 0–7)
ERYTHROCYTE [DISTWIDTH] IN BLOOD BY AUTOMATED COUNT: 11.9 % (ref 11.5–14.5)
GFR SERPL CREATININE-BSD FRML MDRD: 88 ML/MIN/1.73 (ref 42–98)
GLOBULIN UR ELPH-MCNC: 2.5 GM/DL (ref 2.3–3.5)
GLUCOSE BLD-MCNC: 88 MG/DL (ref 60–100)
HCT VFR BLD AUTO: 26.4 % (ref 39–49)
HGB BLD-MCNC: 9.2 G/DL (ref 13.7–17.3)
IMM GRANULOCYTES # BLD: 0.02 10*3/MM3 (ref 0–0.02)
IMM GRANULOCYTES NFR BLD: 0.3 % (ref 0–0.5)
LYMPHOCYTES # BLD AUTO: 1.66 10*3/MM3 (ref 0.6–4.2)
LYMPHOCYTES NFR BLD AUTO: 25.2 % (ref 10–50)
MCH RBC QN AUTO: 30.8 PG (ref 26.5–34)
MCHC RBC AUTO-ENTMCNC: 34.8 G/DL (ref 31.5–36.3)
MCV RBC AUTO: 88.3 FL (ref 80–98)
MONOCYTES # BLD AUTO: 0.55 10*3/MM3 (ref 0–0.9)
MONOCYTES NFR BLD AUTO: 8.4 % (ref 0–12)
NEUTROPHILS # BLD AUTO: 4.17 10*3/MM3 (ref 2–8.6)
NEUTROPHILS NFR BLD AUTO: 63.4 % (ref 37–80)
PLATELET # BLD AUTO: 167 10*3/MM3 (ref 150–450)
PMV BLD AUTO: 9 FL (ref 8–12)
POTASSIUM BLD-SCNC: 4.3 MMOL/L (ref 3.5–5.1)
PROT SERPL-MCNC: 5.1 G/DL (ref 6.3–8.6)
RBC # BLD AUTO: 2.99 10*6/MM3 (ref 4.37–5.74)
SODIUM BLD-SCNC: 132 MMOL/L (ref 137–145)
WBC NRBC COR # BLD: 6.58 10*3/MM3 (ref 3.2–9.8)

## 2018-11-24 PROCEDURE — 97116 GAIT TRAINING THERAPY: CPT

## 2018-11-24 PROCEDURE — 25810000003 SODIUM CHLORIDE 0.9 % WITH KCL 20 MEQ 20-0.9 MEQ/L-% SOLUTION: Performed by: ORTHOPAEDIC SURGERY

## 2018-11-24 PROCEDURE — 97110 THERAPEUTIC EXERCISES: CPT

## 2018-11-24 PROCEDURE — 85025 COMPLETE CBC W/AUTO DIFF WBC: CPT | Performed by: NURSE PRACTITIONER

## 2018-11-24 PROCEDURE — 97530 THERAPEUTIC ACTIVITIES: CPT

## 2018-11-24 PROCEDURE — 80053 COMPREHEN METABOLIC PANEL: CPT | Performed by: NURSE PRACTITIONER

## 2018-11-24 RX ORDER — HYDROCODONE BITARTRATE AND ACETAMINOPHEN 7.5; 325 MG/1; MG/1
1 TABLET ORAL EVERY 6 HOURS PRN
Qty: 12 TABLET | Refills: 0 | Status: SHIPPED | OUTPATIENT
Start: 2018-11-24 | End: 2018-12-03

## 2018-11-24 RX ORDER — CARVEDILOL 6.25 MG/1
6.25 TABLET ORAL 2 TIMES DAILY WITH MEALS
Qty: 60 TABLET | Refills: 3 | Status: SHIPPED | OUTPATIENT
Start: 2018-11-24 | End: 2019-03-22 | Stop reason: SDUPTHER

## 2018-11-24 RX ADMIN — LISINOPRIL 20 MG: 20 TABLET ORAL at 09:32

## 2018-11-24 RX ADMIN — POTASSIUM CHLORIDE AND SODIUM CHLORIDE 100 ML/HR: 900; 150 INJECTION, SOLUTION INTRAVENOUS at 09:33

## 2018-11-24 NOTE — PLAN OF CARE
Problem: Patient Care Overview  Goal: Plan of Care Review  Outcome: Ongoing (interventions implemented as appropriate)   11/24/18 0811   Coping/Psychosocial   Plan of Care Reviewed With patient   Plan of Care Review   Progress improving   OTHER   Outcome Summary pt responded well to therapy w/ increased gait to 68 ft SBA w/ RW. pt required min A for bed mob and SBA for t/fs. pt ascended stairs w/ CGA. pt states he is able to maintain TTWB w/ stairs but its questionable. pt states he will have assist at home. pt met 3 new goals this tx. Recommend 24/7 assist and  PT vs rehab facility upon DC.

## 2018-11-24 NOTE — THERAPY TREATMENT NOTE
Acute Care - Physical Therapy Treatment Note  [facility]     Patient Name: Maciej Funk  : 1935  MRN: [id number]  Today's Date: 2018  Onset of Illness/Injury or Date of Surgery: 18  Date of Referral to PT: 18  Referring Physician: TRESA Villatoro MD.    Admit Date: 2018    Visit Dx:    ICD-10-CM ICD-9-CM   1. Closed displaced intertrochanteric fracture of left femur, initial encounter (CMS/[facility]) S72.142A 820.21   2. Mass of right lung R91.8 786.6   3. Closed displaced subtrochanteric fracture of left femur, initial encounter (CMS/[facility]) S72.22XA 820.22   4. Syncope, unspecified syncope type R55 780.2   5. Impaired functional mobility and activity tolerance Z74.09 V49.89   6. Impaired mobility and activities of daily living Z74.09 799.89   7. Atherosclerosis I70.90 440.9   8. History of adenomatous polyp of colon Z86.010 V12.72   9. Mixed hyperlipidemia E78.2 272.2   10. Tobacco dependence syndrome F17.200 305.1   11. Nicotine dependence, cigarettes, with other nicotine-induced disorders F17.218 292.89   12. Carotid stenosis, bilateral I65.23 433.10     433.30   13. 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 Follow-up surgery care Z09 V67.00   15. Pulmonary hypertension (CMS/HCC) I27.20 416.8     Patient Active Problem List   Diagnosis   • Hypertension   • History of adenomatous polyp of colon   • Hyperlipidemia   • Tobacco dependence syndrome   • Nicotine dependence, cigarettes, with other nicotine-induced disorders   • Carotid stenosis, bilateral   • Optic atrophy   • Nonexudative age-related macular degeneration   • Nuclear cataract   • Follow-up surgery care   • Closed displaced intertrochanteric fracture of left femur (CMS/HCC)   • Pulmonary hypertension (CMS/HCC)   • Emphysema of lung (CMS/HCC)   • Atherosclerosis       Therapy Treatment    Rehabilitation Treatment Summary     Row Name 18 1007 18 0841          Treatment Time/Intention    Discipline  occupational  therapy assistant  -KD  physical therapy assistant  -JW     Document Type  therapy note (daily note)  -KD  therapy note (daily note)  -JW     Subjective Information  no complaints  -KD  --     Mode of Treatment  occupational therapy;individual therapy  -KD  physical therapy;individual therapy  -JW     Patient/Family Observations  no family present  -KD  --     Therapy Frequency (PT Clinical Impression)  --  2 times/day  -JW     Therapy Frequency (OT Eval)  daily  -KD  --     Patient Effort  good  -KD  good  -JW     Existing Precautions/Restrictions  weight bearing  LLE- TTWB  -KD  weight bearing TDWB on L LE, [place]  -JW     Recorded by [KD] Dot Askew MONTES/L 11/24/18 1136 [JW] Johny Yeager, PTA 11/24/18 1231     Row Name 11/24/18 1007 11/24/18 0841          Vital Signs    Pre Systolic BP Rehab  149  -KD  153  -JW     Pre Treatment Diastolic BP  79  -KD  69  -JW     Post Systolic BP Rehab  --  148  -JW     Post Treatment Diastolic BP  --  75  -JW     Pretreatment Heart Rate (beats/min)  96  -KD  94  -JW     Posttreatment Heart Rate (beats/min)  100  -KD  98  -JW     Pre SpO2 (%)  93  -KD  97  -JW     O2 Delivery Pre Treatment  room air  -KD  room air  -JW     Post SpO2 (%)  94  -KD  98  -JW     O2 Delivery Post Treatment  room air  -KD  room air  -JW     Pre Patient Position  Sitting  -KD  Supine  -JW     Intra Patient Position  Sitting  -KD  --     Post Patient Position  Sitting  -KD  Sitting  -JW     Recorded by [KD] Dot Askew MONTES/L 11/24/18 1141 [JW] Johny Yeager, PTA 11/24/18 1231     Row Name 11/24/18 1007 11/24/18 0841          Cognitive Assessment/Intervention- PT/OT    Affect/Mental Status (Cognitive)  WNL  -KD  WNL  -JW     Orientation Status (Cognition)  oriented x 4  -KD  oriented x 4  -JW     Follows Commands (Cognition)  WNL  -KD  WNL  -JW     Personal Safety Interventions  fall prevention program maintained  -KD  gait belt;muscle strengthening facilitated;nonskid shoes/slippers when  out of bed;supervised activity  -JW     Recorded by [KD] Dot Askew, MONTES/L 11/24/18 1141 [JW] Johny Yeager, PTA 11/24/18 1231     Row Name 11/24/18 1007 11/24/18 0841          Mobility Assessment/Intervention    Extremity Weight-bearing Status  left lower extremity  -KD  left lower extremity  -JW     Left Lower Extremity (Weight-bearing Status)  touch down weight-bearing (TDWB)  (Significant)   -KD  touch down weight-bearing (TDWB)  (Significant)   -JW     Recorded by [KD] Dot Askew, MONTES/L 11/24/18 1141 [JW] Johny Yeager, PTA 11/24/18 1231     Row Name 11/24/18 0841             Bed Mobility Assessment/Treatment    Bed Mobility Assessment/Treatment  supine-sit  -JW      Supine-Sit [place] (Bed Mobility)  minimum assist (75% patient effort)  -JW      Bed Mobility, Safety Issues  decreased use of legs for bridging/pushing  -JW      Assistive Device (Bed Mobility)  bed rails;head of bed elevated  -JW      Recorded by [JW] Johny Yeager, PTA 11/24/18 1231      Row Name 11/24/18 0841             Transfer Assessment/Treatment    Transfer Assessment/Treatment  sit-stand transfer;stand-sit transfer;bed-chair transfer  -JW      Comment (Transfers)  pt t/f bed-recliner-transport chair-recliner  -JW      Recorded by [JW] Johny Yeager, PTA 11/24/18 1231      Row Name 11/24/18 0841             Bed-Chair Transfer    Bed-Chair [place] (Transfers)  supervision  -JW      Assistive Device (Bed-Chair Transfers)  walker, front-wheeled  -JW      Recorded by [JW] Johny Yeager, PTA 11/24/18 1231      Row Name 11/24/18 0841             Sit-Stand Transfer    Sit-Stand [place] (Transfers)  supervision  -JW      Assistive Device (Sit-Stand Transfers)  walker, front-wheeled  -JW      Recorded by [JW] Johny Yeager, PTA 11/24/18 1231      Row Name 11/24/18 0841             Stand-Sit Transfer    Stand-Sit [place] (Transfers)  supervision  -JW      Assistive Device (Stand-Sit Transfers)   "walker, front-wheeled  -JW      Recorded by [JW] Johny Yeager, PTA 11/24/18 1231      Row Name 11/24/18 0841             Gait/Stairs Assessment/Training    Gait/Stairs Assessment/Training  gait/ambulation independence;gait/ambulation assistive device;distance ambulated;gait pattern;gait deviations;maintains weight-bearing status  -JW      [place] Level (Gait)  minimum assist (75% patient effort)  -JW      Assistive Device (Gait)  walker, front-wheeled  -JW      Distance in Feet (Gait)  68+multiple short trips.   -JW      Pattern (Gait)  3-point;step-to  -JW      Deviations/Abnormal Patterns (Gait)  marias decreased  -JW      Maintains Weight-bearing Status (Gait)  able to maintain  -JW      Negotiation (Stairs)  stairs independence;stairs assistive device;handrail location;number of steps;ascending technique;descending technique;maintains weight-bearing status  -JW      [place] Level (Stairs)  contact guard  -JW      Handrail Location (Stairs)  right side (ascending)  -JW      Number of Steps (Stairs)  3 4\", 2 6\"   -JW      Ascending Technique (Stairs)  step-to-step  -JW      Descending Technique (Stairs)  step-to-step  -JW      Maintains Weight-bearing Status (Stairs)  other (see comments) questionable- pt states he was able to maintain TTWB   -JW      Recorded by [JW] Johny Yeager, PTA 11/24/18 1231      Row Name 11/24/18 1007             Therapeutic Exercise    Upper Extremity Range of Motion (Therapeutic Exercise)  shoulder flexion/extension, bilateral;shoulder abduction/adduction, bilateral;shoulder horizontal abduction/adduction, bilateral;shoulder internal/external rotation, bilateral;elbow flexion/extension, bilateral;forearm supination/pronation, bilateral;wrist flexion/extension, bilateral  -KD      Weight/Resistance (Therapeutic Exercise)  2 pounds  -KD      Exercise Type (Therapeutic Exercise)  AROM (active range of motion)  -KD      Position (Therapeutic Exercise)  seated  -KD      " Sets/Reps (Therapeutic Exercise)  1/20  -KD      Equipment (Therapeutic Exercise)  free weight, barbell  -KD      Expected Outcome (Therapeutic Exercise)  improve performance, transfer skills  -KD      Comment (Therapeutic Exercise)  RB'S PRN  -KD      Recorded by [KD] Dot Askew COTA/L 11/24/18 1141      Row Name 11/24/18 1007 11/24/18 0841          Positioning and Restraints    Pre-Treatment Position  sitting in chair/recliner  -KD  in bed  -JW     Post Treatment Position  chair  -KD  chair  -JW     In Chair  sitting;call light within reach;encouraged to call for assist;exit alarm on  -KD  reclined;call light within reach;encouraged to call for assist;exit alarm on all needs met.   -JW     Recorded by [KD] Dot Askew COTA/BARRERA 11/24/18 1141 [JW] Johny Yeager, PTA 11/24/18 1231     Row Name 11/24/18 1007 11/24/18 0841          Pain Scale: Numbers Pre/Post-Treatment    Pain Scale: Numbers, Pretreatment  0/10 - no pain  -KD  0/10 - no pain  -JW     Pain Scale: Numbers, Post-Treatment  0/10 - no pain  -KD  0/10 - no pain  -JW     Recorded by [KD] Dot Askew MONTES/BARRERA 11/24/18 1141 [JW] Johny Yeager, PTA 11/24/18 1231     Row Name                Wound 11/21/18 1538 Left hip incision;surgical    Wound - Properties Group Date first assessed: 11/21/18 [KM] Time first assessed: 1538 [KM] Present On Admission : no [KM] Side: Left [KM] Location: hip [KM] Type: incision;surgical [KM] Recorded by:  [KM] Martha Juan RN 11/21/18 1538    Row Name 11/24/18 1007             Outcome Summary/Treatment Plan (OT)    Daily Summary of Progress (OT)  progress toward functional goals as expected  -KD      Plan for Continued Treatment (OT)  cont ot poc  -KD      Anticipated Discharge Disposition (OT)  home with home health;home with assist  -KD      Recorded by [KD] Dot Askew COTA/L 11/24/18 1141      Row Name 11/24/18 0841             Outcome Summary/Treatment Plan (PT)    Daily Summary of Progress (PT)   progress toward functional goals as expected  -JW      Plan for Continued Treatment (PT)  continue  -JW      Anticipated Discharge Disposition (PT)  home with assist;home with home health;inpatient rehabilitation facility  -JW      Recorded by [JW] Johny Yeager, PTA 11/24/18 1231        User Key  (r) = Recorded By, (t) = Taken By, (c) = Cosigned By    Initials Name Effective Dates Discipline    JW Johny Yeager, PTA 03/07/18 -  PT    KD Dot Askew, MONTES/L 03/07/18 -  OT    Martha Fregoso, RN 05/21/18 -  Nurse          Wound 11/21/18 1538 Left hip incision;surgical (Active)   Closure Open to air 11/23/2018  8:15 PM   Periwound blistered 11/23/2018  8:15 PM       PT Rehab Goals     Row Name 11/24/18 0841             Bed Mobility Goal 1 (PT)    Activity/Assistive Device (Bed Mobility Goal 1, PT)  sit to supine/supine to sit  -JW      [place] Level/Cues Needed (Bed Mobility Goal 1, PT)  standby assist  -JW      Time Frame (Bed Mobility Goal 1, PT)  by discharge  -JW      Barriers (Bed Mobility Goal 1, PT)  pain  -JW      Progress/Outcomes (Bed Mobility Goal 1, PT)  goal not met  -JW         Transfer Goal 1 (PT)    Activity/Assistive Device (Transfer Goal 1, PT)  sit-to-stand/stand-to-sit  -JW      [place] Level/Cues Needed (Transfer Goal 1, PT)  standby assist  -JW      Time Frame (Transfer Goal 1, PT)  by discharge  -JW      Barriers (Transfers Goal 1, PT)  pain, wt bearing restrictions  -JW      Progress/Outcome (Transfer Goal 1, PT)  goal met  (Significant)   -JW         Gait Training Goal 1 (PT)    Activity/Assistive Device (Gait Training Goal 1, PT)  walker, rolling  -JW      [place] Level (Gait Training Goal 1, PT)  standby assist  -JW      Distance (Gait Goal 1, PT)  10 ft x 1 maintaining TDWB on L LE consistently  -JW      Time Frame (Gait Training Goal 1, PT)  by discharge  -JW      Barriers (Gait Training Goal 1, PT)  pain, fatigue, impaired standing balance  -JW       Progress/Outcome (Gait Training Goal 1, PT)  goal met  (Significant)   -JW         Stairs Goal 1 (PT)    Activity/Assistive Device (Stairs Goal 1, PT)  step-to-step;ascending stairs;descending stairs  -JW      [place] Level/Cues Needed (Stairs Goal 1, PT)  contact guard assist  -JW      Number of Stairs (Stairs Goal 1, PT)  2  -JW      Time Frame (Stairs Goal 1, PT)  by discharge  -JW      Barriers (Stairs Goal 1, PT)  TDWB on L LE, pain  -JW      Progress/Outcome (Stairs Goal 1, PT)  goal met  (Significant)   -JW        User Key  (r) = Recorded By, (t) = Taken By, (c) = Cosigned By    Initials Name Provider Type Discipline    Johny Barros, PTA Physical Therapy Assistant PT          Physical Therapy Education     Title: PT OT SLP Therapies (Active)     Topic: Physical Therapy (Active)     Point: Mobility training (Active)     Learning Progress Summary           Patient Acceptance, E, NR by JW at 11/24/2018 12:36 PM    Acceptance, D, NR by BS at 11/22/2018 12:00 PM    Comment:  vc's and tc's for correctly executing TDWB on L LE upon assuming standing, as well as cues for scooting self to the HOB.                   Point: Home exercise program (Active)     Learning Progress Summary           Patient Acceptance, D, NR by BS at 11/22/2018 12:00 PM    Comment:  vc's and tc's for correctly executing TDWB on L LE upon assuming standing, as well as cues for scooting self to the HOB.                   Point: Body mechanics (Active)     Learning Progress Summary           Patient Acceptance, D, NR by BS at 11/22/2018 12:00 PM    Comment:  vc's and tc's for correctly executing TDWB on L LE upon assuming standing, as well as cues for scooting self to the HOB.                   Point: Precautions (Active)     Learning Progress Summary           Patient Acceptance, E, NR by JW at 11/24/2018 12:36 PM    Acceptance, D, NR by BS at 11/22/2018 12:00 PM    Comment:  vc's and tc's for correctly executing TDWB on L LE upon  assuming standing, as well as cues for scooting self to the HOB.                               User Key     Initials Effective Dates Name Provider Type Discipline     03/07/18 -  Johny Yeager, PTA Physical Therapy Assistant PT    BS 04/08/18 -  Tulio Duong, PT Physical Therapist PT                PT Recommendation and Plan  Anticipated Discharge Disposition (PT): home with assist, home with home health, inpatient rehabilitation facility  Therapy Frequency (PT Clinical Impression): 2 times/day  Outcome Summary/Treatment Plan (PT)  Daily Summary of Progress (PT): progress toward functional goals as expected  Plan for Continued Treatment (PT): continue  Anticipated Discharge Disposition (PT): home with assist, home with home health, inpatient rehabilitation facility  Plan of Care Reviewed With: patient  Progress: improving  Outcome Summary: pt responded well to therapy w/ increased gait to 68 ft SBA w/ RW. pt required min A for bed mob and SBA for t/fs. pt ascended stairs w/ CGA. pt states he is able to maintain TTWB w/ stairs but its questionable. pt states he will have assist at home. pt met 3 new goals this tx. Recommend 24/7 assist and HH PT vs rehab facility upon DC.   Outcome Measures     Row Name 11/24/18 1007 11/24/18 0841 11/23/18 1600       How much help from another person do you currently need...    Turning from your back to your side while in flat bed without using bedrails?  --  3  -JW  3  -TA    Moving from lying on back to sitting on the side of a flat bed without bedrails?  --  3  -JW  3  -TA    Moving to and from a bed to a chair (including a wheelchair)?  --  3  -JW  3  -TA    Standing up from a chair using your arms (e.g., wheelchair, bedside chair)?  --  3  -JW  3  -TA    Climbing 3-5 steps with a railing?  --  3  -JW  2  -TA    To walk in hospital room?  --  3  -JW  3  -TA    AM-PAC 6 Clicks Score  --  18  -WJ  17  -TA       How much help from another is currently needed...    Putting on  and taking off regular lower body clothing?  2  -KD  --  --    Bathing (including washing, rinsing, and drying)  2  -KD  --  --    Toileting (which includes using toilet bed pan or urinal)  2  -KD  --  --    Putting on and taking off regular upper body clothing  3  -KD  --  --    Taking care of personal grooming (such as brushing teeth)  4  -KD  --  --    Eating meals  4  -KD  --  --    Score  17  -KD  --  --       Functional Assessment    Outcome Measure Options  --  AM-PAC 6 Clicks Basic Mobility (PT)  -JW  AM-PAC 6 Clicks Basic Mobility (PT)  -TA    Row Name 11/23/18 1300 11/23/18 1100 11/22/18 1038       How much help from another person do you currently need...    Turning from your back to your side while in flat bed without using bedrails?  3  -TA  --  --    Moving from lying on back to sitting on the side of a flat bed without bedrails?  3  -TA  --  --    Moving to and from a bed to a chair (including a wheelchair)?  3  -TA  --  --    Standing up from a chair using your arms (e.g., wheelchair, bedside chair)?  3  -TA  --  --    Climbing 3-5 steps with a railing?  2  -TA  --  --    To walk in hospital room?  2  -TA  --  --    AM-PAC 6 Clicks Score  16  -TA  --  --       How much help from another is currently needed...    Putting on and taking off regular lower body clothing?  --  2  -CS  2  -RB    Bathing (including washing, rinsing, and drying)  --  2  -CS  2  -RB    Toileting (which includes using toilet bed pan or urinal)  --  2  -CS  2  -RB    Putting on and taking off regular upper body clothing  --  3  -CS  3  -RB    Taking care of personal grooming (such as brushing teeth)  --  4  -CS  4  -RB    Eating meals  --  4  -CS  4  -RB    Score  --  17  -CS  17  -RB       Functional Assessment    Outcome Measure Options  AM-PAC 6 Clicks Basic Mobility (PT)  -TA  AM-PAC 6 Clicks Daily Activity (OT)  -CS  AM-PAC 6 Clicks Daily Activity (OT)  -RB    Row Name 11/22/18 0824             How much help from another  person do you currently need...    Turning from your back to your side while in flat bed without using bedrails?  3  -BS      Moving from lying on back to sitting on the side of a flat bed without bedrails?  2  -BS      Moving to and from a bed to a chair (including a wheelchair)?  3  -BS      Standing up from a chair using your arms (e.g., wheelchair, bedside chair)?  3  -BS      Climbing 3-5 steps with a railing?  2  -BS      To walk in hospital room?  2  -BS      AM-PAC 6 Clicks Score  15  -BS         Functional Assessment    Outcome Measure Options  AM-PAC 6 Clicks Basic Mobility (PT)  -BS        User Key  (r) = Recorded By, (t) = Taken By, (c) = Cosigned By    Initials Name Provider Type    RB Tayo Davis, OT Occupational Therapist    Olesya Romero, PTA Physical Therapy Assistant    Johny Barros, PTA Physical Therapy Assistant    KD Dot Askew, MONTES/L Occupational Therapy Assistant    Mary Sotomayor, MONTES/L Occupational Therapy Assistant    BS Tulio Duong, PT Physical Therapist         Time Calculation:   PT Charges     Row Name 11/24/18 1242             Time Calculation    Start Time  0841  -JW      Stop Time  0925  -      Time Calculation (min)  44 min  -JW         Time Calculation- PT    Total Timed Code Minutes- PT  44 minute(s)  -JW        User Key  (r) = Recorded By, (t) = Taken By, (c) = Cosigned By    Initials Name Provider Type    Johny Barros, PTA Physical Therapy Assistant        Therapy Suggested Charges     Code   Minutes Charges    34795 (CPT®) Hc Pt Neuromusc Re Education Ea 15 Min      48029 (CPT®) Hc Pt Ther Proc Ea 15 Min 15 1    57082 (CPT®) Hc Gait Training Ea 15 Min      83762 (CPT®) Hc Pt Therapeutic Act Ea 15 Min      50824 (CPT®) Hc Pt Manual Therapy Ea 15 Min      83466 (CPT®) Hc Pt Iontophoresis Ea 15 Min      60467 (CPT®) Hc Pt Elec Stim Ea-Per 15 Min      26733 (CPT®) Hc Pt Ultrasound Ea 15 Min      02274 (CPT®) Hc Pt Self Care/Mgmt/Train Ea 15  Min      68738 (CPT®) Hc Pt Prosthetic (S) Train Initial Encounter, Each 15 Min      51900 (CPT®) Hc Pt Orthotic(S)/Prosthetic(S) Encounter, Each 15 Min      61579 (CPT®) Hc Orthotic(S) Mgmt/Train Initial Encounter, Each 15min      Total  15 1        Therapy Charges for Today     Code Description Service Date Service Provider Modifiers Qty    15890676903 HC GAIT TRAINING EA 15 MIN 11/24/2018 Johny Yeager, PTA GP 1    16719259339 HC PT THERAPEUTIC ACT EA 15 MIN 11/24/2018 Johny Yeager, PTA GP 2          PT G-Codes  Outcome Measure Options: AM-PAC 6 Clicks Basic Mobility (PT)  AM-PAC 6 Clicks Score: 18  Score: 17  Functional Limitation: Mobility: Walking and moving around  Mobility: Walking and Moving Around Current Status (): At least 40 percent but less than 60 percent impaired, limited or restricted  Mobility: Walking and Moving Around Goal Status (): At least 20 percent but less than 40 percent impaired, limited or restricted    Johny Yeager PTA  11/24/2018

## 2018-11-24 NOTE — THERAPY DISCHARGE NOTE
Acute Care - Physical Therapy Discharge Summary  [facility]       Patient Name: Maciej Funk  : 1935  MRN: [id number]    Today's Date: 2018  Onset of Illness/Injury or Date of Surgery: 18    Date of Referral to PT: 18  Referring Physician: TRESA Villatoro MD.      Admit Date: 2018      PT Recommendation and Plan    Visit Dx:    ICD-10-CM ICD-9-CM   1. Closed displaced intertrochanteric fracture of left femur, initial encounter (CMS/[facility]) S72.142A 820.21   2. Mass of right lung R91.8 786.6   3. Closed displaced subtrochanteric fracture of left femur, initial encounter (CMS/[facility]) S72.22XA 820.22   4. Syncope, unspecified syncope type R55 780.2   5. Impaired functional mobility and activity tolerance Z74.09 V49.89   6. Impaired mobility and activities of daily living Z74.09 799.89   7. Atherosclerosis I70.90 440.9   8. History of adenomatous polyp of colon Z86.010 V12.72   9. Mixed hyperlipidemia E78.2 272.2   10. Tobacco dependence syndrome F17.200 305.1   11. Nicotine dependence, cigarettes, with other nicotine-induced disorders F17.218 292.89   12. Carotid stenosis, bilateral I65.23 433.10     433.30   13. 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 Follow-up surgery care Z09 V67.00   15. Pulmonary hypertension (CMS/HCC) I27.20 416.8       Outcome Measures     Row Name 18 1007 18 0841 18 1600       How much help from another person do you currently need...    Turning from your back to your side while in flat bed without using bedrails?  --  3  -JW  3  -TA    Moving from lying on back to sitting on the side of a flat bed without bedrails?  --  3  -JW  3  -TA    Moving to and from a bed to a chair (including a wheelchair)?  --  3  -JW  3  -TA    Standing up from a chair using your arms (e.g., wheelchair, bedside chair)?  --  3  -JW  3  -TA    Climbing 3-5 steps with a railing?  --  3  -JW  2  -TA    To walk in hospital room?  --  3  -JW  3  -TA    AM-PAC 6 Clicks  Score  --  18  -JW  17  -TA       How much help from another is currently needed...    Putting on and taking off regular lower body clothing?  2  -KD  --  --    Bathing (including washing, rinsing, and drying)  2  -KD  --  --    Toileting (which includes using toilet bed pan or urinal)  2  -KD  --  --    Putting on and taking off regular upper body clothing  3  -KD  --  --    Taking care of personal grooming (such as brushing teeth)  4  -KD  --  --    Eating meals  4  -KD  --  --    Score  17  -KD  --  --       Functional Assessment    Outcome Measure Options  --  AM-PAC 6 Clicks Basic Mobility (PT)  -JW  -[facility] 6 Clicks Basic Mobility (PT)  -TA    Row Name 11/23/18 1300 11/23/18 1100 11/22/18 1038       How much help from another person do you currently need...    Turning from your back to your side while in flat bed without using bedrails?  3  -TA  --  --    Moving from lying on back to sitting on the side of a flat bed without bedrails?  3  -TA  --  --    Moving to and from a bed to a chair (including a wheelchair)?  3  -TA  --  --    Standing up from a chair using your arms (e.g., wheelchair, bedside chair)?  3  -TA  --  --    Climbing 3-5 steps with a railing?  2  -TA  --  --    To walk in hospital room?  2  -TA  --  --    AM-PAC 6 Clicks Score  16  -TA  --  --       How much help from another is currently needed...    Putting on and taking off regular lower body clothing?  --  2  -CS  2  -RB    Bathing (including washing, rinsing, and drying)  --  2  -CS  2  -RB    Toileting (which includes using toilet bed pan or urinal)  --  2  -CS  2  -RB    Putting on and taking off regular upper body clothing  --  3  -CS  3  -RB    Taking care of personal grooming (such as brushing teeth)  --  4  -CS  4  -RB    Eating meals  --  4  -CS  4  -RB    Score  --  17  -CS  17  -RB       Functional Assessment    Outcome Measure Options  AM-PAC 6 Clicks Basic Mobility (PT)  -TA  AM-PAC 6 Clicks Daily Activity (OT)  -CS  AM-PAC 6  Clicks Daily Activity (OT)  -    Row Name 11/22/18 0824             How much help from another person do you currently need...    Turning from your back to your side while in flat bed without using bedrails?  3  -BS      Moving from lying on back to sitting on the side of a flat bed without bedrails?  2  -BS      Moving to and from a bed to a chair (including a wheelchair)?  3  -BS      Standing up from a chair using your arms (e.g., wheelchair, bedside chair)?  3  -BS      Climbing 3-5 steps with a railing?  2  -BS      To walk in hospital room?  2  -BS      AM-PAC 6 Clicks Score  15  -BS         Functional Assessment    Outcome Measure Options  AM-PAC 6 Clicks Basic Mobility (PT)  -BS        User Key  (r) = Recorded By, (t) = Taken By, (c) = Cosigned By    Initials Name Provider Type    RB Tayo Davis, OT Occupational Therapist    Olesya Romero, PTA Physical Therapy Assistant    Johny Barros, PTA Physical Therapy Assistant    KD Dot Askew, MONTES/L Occupational Therapy Assistant    Mary Sotomayor, MONTES/L Occupational Therapy Assistant    BS Tulio Duong, PT Physical Therapist          PT Charges     Row Name 11/24/18 1242             Time Calculation    Start Time  0841  -JW      Stop Time  0925  -JW      Time Calculation (min)  44 min  -JW         Time Calculation- PT    Total Timed Code Minutes- PT  44 minute(s)  -        User Key  (r) = Recorded By, (t) = Taken By, (c) = Cosigned By    Initials Name Provider Type    Johny Barros, PTA Physical Therapy Assistant        Therapy Suggested Charges     Code   Minutes Charges    80471 (CPT®) Hc Pt Neuromusc Re Education Ea 15 Min      84967 (CPT®) Hc Pt Ther Proc Ea 15 Min 15 1    03721 (CPT®) Hc Gait Training Ea 15 Min      16002 (CPT®) Hc Pt Therapeutic Act Ea 15 Min      52340 (CPT®) Hc Pt Manual Therapy Ea 15 Min      90895 (CPT®) Hc Pt Iontophoresis Ea 15 Min      21721 (CPT®) Hc Pt Elec Stim Ea-Per 15 Min      43979 (CPT®) Hc  Pt Ultrasound Ea 15 Min      88968 (CPT®) Hc Pt Self Care/Mgmt/Train Ea 15 Min      00886 (CPT®) Hc Pt Prosthetic (S) Train Initial Encounter, Each 15 Min      11848 (CPT®) Hc Pt Orthotic(S)/Prosthetic(S) Encounter, Each 15 Min      04034 (CPT®) Hc Orthotic(S) Mgmt/Train Initial Encounter, Each 15min      Total  15 1          PT Rehab Goals     Row Name 11/24/18 0841             Bed Mobility Goal 1 (PT)    Activity/Assistive Device (Bed Mobility Goal 1, PT)  sit to supine/supine to sit  -JW      [place] Level/Cues Needed (Bed Mobility Goal 1, PT)  standby assist  -JW      Time Frame (Bed Mobility Goal 1, PT)  by discharge  -JW      Barriers (Bed Mobility Goal 1, PT)  pain  -JW      Progress/Outcomes (Bed Mobility Goal 1, PT)  goal not met  -JW         Transfer Goal 1 (PT)    Activity/Assistive Device (Transfer Goal 1, PT)  sit-to-stand/stand-to-sit  -JW      [place] Level/Cues Needed (Transfer Goal 1, PT)  standby assist  -JW      Time Frame (Transfer Goal 1, PT)  by discharge  -JW      Barriers (Transfers Goal 1, PT)  pain, wt bearing restrictions  -JW      Progress/Outcome (Transfer Goal 1, PT)  goal met  (Significant)   -JW         Gait Training Goal 1 (PT)    Activity/Assistive Device (Gait Training Goal 1, PT)  walker, rolling  -JW      [place] Level (Gait Training Goal 1, PT)  standby assist  -JW      Distance (Gait Goal 1, PT)  10 ft x 1 maintaining TDWB on L LE consistently  -JW      Time Frame (Gait Training Goal 1, PT)  by discharge  -JW      Barriers (Gait Training Goal 1, PT)  pain, fatigue, impaired standing balance  -JW      Progress/Outcome (Gait Training Goal 1, PT)  goal met  (Significant)   -JW         Stairs Goal 1 (PT)    Activity/Assistive Device (Stairs Goal 1, PT)  step-to-step;ascending stairs;descending stairs  -JW      [place] Level/Cues Needed (Stairs Goal 1, PT)  contact guard assist  -JW      Number of Stairs (Stairs Goal 1, PT)  2  -JW      Time Frame (Stairs Goal 1,  PT)  by discharge  -JW      Barriers (Stairs Goal 1, PT)  TDWB on L LE, pain  -JW      Progress/Outcome (Stairs Goal 1, PT)  goal met  (Significant)   -JW        User Key  (r) = Recorded By, (t) = Taken By, (c) = Cosigned By    Initials Name Provider Type Discipline    Johny Barros, PTA Physical Therapy Assistant PT              PT Discharge Summary  Anticipated Discharge Disposition (PT): home with assist, home with home health, inpatient rehabilitation facility  Reason for Discharge: Discharge from facility, Per MD order  Outcomes Achieved: Patient able to partially acheive established goals, Refer to plan of care for updates on goals achieved  Discharge Destination: Home with home health      Edna Cooley, PT   11/24/2018

## 2018-11-24 NOTE — DISCHARGE INSTR - APPOINTMENTS
DR BOUDREAUX 1 WEEK (OFFICE WILL CALL WITH APPOINTMENT)  DR REA 10-12 DAYS (OFFICE WILL CALL WITH APPOINTMENT)

## 2018-11-24 NOTE — PLAN OF CARE
Problem: Patient Care Overview  Goal: Plan of Care Review  Outcome: Ongoing (interventions implemented as appropriate)   11/24/18 0351   Coping/Psychosocial   Plan of Care Reviewed With patient   Plan of Care Review   Progress improving   OTHER   Outcome Summary Pt rested between care. VSS. No c/o pain.

## 2018-11-24 NOTE — THERAPY TREATMENT NOTE
Acute Care - Occupational Therapy Treatment Note  [facility]     Patient Name: Maciej Funk  : 1935  MRN: [id number]  Today's Date: 2018  Onset of Illness/Injury or Date of Surgery: 18  Date of Referral to OT: 18  Referring Physician: TRESA Villatoro MD.    Admit Date: 2018       ICD-10-CM ICD-9-CM   1. Closed displaced intertrochanteric fracture of left femur, initial encounter (CMS/[facility]) S72.142A 820.21   2. Mass of right lung R91.8 786.6   3. Closed displaced subtrochanteric fracture of left femur, initial encounter (CMS/[facility]) S72.22XA 820.22   4. Syncope, unspecified syncope type R55 780.2   5. Impaired functional mobility and activity tolerance Z74.09 V49.89   6. Impaired mobility and activities of daily living Z74.09 799.89   7. Atherosclerosis I70.90 440.9   8. History of adenomatous polyp of colon Z86.010 V12.72   9. Mixed hyperlipidemia E78.2 272.2   10. Tobacco dependence syndrome F17.200 305.1   11. Nicotine dependence, cigarettes, with other nicotine-induced disorders F17.218 292.89   12. Carotid stenosis, bilateral I65.23 433.10     433.30   13. 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 Follow-up surgery care Z09 V67.00   15. Pulmonary hypertension (CMS/HCC) I27.20 416.8     Patient Active Problem List   Diagnosis   • Hypertension   • History of adenomatous polyp of colon   • Hyperlipidemia   • Tobacco dependence syndrome   • Nicotine dependence, cigarettes, with other nicotine-induced disorders   • Carotid stenosis, bilateral   • Optic atrophy   • Nonexudative age-related macular degeneration   • Nuclear cataract   • Follow-up surgery care   • Closed displaced intertrochanteric fracture of left femur (CMS/HCC)   • Pulmonary hypertension (CMS/HCC)   • Emphysema of lung (CMS/HCC)   • Atherosclerosis     Past Medical History:   Diagnosis Date   • Abdominal aortic aneurysm (CMS/HCC)    • Carotid stenosis, bilateral    • Cataract    • Emphysema of lung (CMS/HCC)    •  Herpes zoster    • Hyperlipidemia    • Hypertension      Past Surgical History:   Procedure Laterality Date   • CARDIAC CATHETERIZATION     • COLONOSCOPY     • COLONOSCOPY W/ POLYPECTOMY     • HAND SURGERY         Therapy Treatment    Rehabilitation Treatment Summary     Row Name 11/24/18 1007             Treatment Time/Intention    Discipline  occupational therapy assistant  -KD      Document Type  therapy note (daily note)  -KD      Subjective Information  no complaints  -KD      Mode of Treatment  occupational therapy;individual therapy  -KD      Patient/Family Observations  no family present  -KD      Therapy Frequency (OT Eval)  daily  -KD      Patient Effort  good  -KD      Existing Precautions/Restrictions  weight bearing  LLE- TTWB  -KD      Recorded by [KD] Dot Askew COTA/L 11/24/18 1136      Row Name 11/24/18 1007             Vital Signs    Pre Systolic BP Rehab  149  -KD      Pre Treatment Diastolic BP  79  -KD      Pretreatment Heart Rate (beats/min)  96  -KD      Posttreatment Heart Rate (beats/min)  100  -KD      Pre SpO2 (%)  93  -KD      O2 Delivery Pre Treatment  room air  -KD      Post SpO2 (%)  94  -KD      O2 Delivery Post Treatment  room air  -KD      Pre Patient Position  Sitting  -KD      Intra Patient Position  Sitting  -KD      Post Patient Position  Sitting  -KD      Recorded by [KD] Dot Askew COTA/L 11/24/18 1141      Row Name 11/24/18 1007             Cognitive Assessment/Intervention- PT/OT    Affect/Mental Status (Cognitive)  WNL  -KD      Orientation Status (Cognition)  oriented x 4  -KD      Follows Commands (Cognition)  WNL  -KD      Personal Safety Interventions  fall prevention program maintained  -KD      Recorded by [KD] Dot Askew COTA/L 11/24/18 1141      Row Name 11/24/18 1007             Mobility Assessment/Intervention    Extremity Weight-bearing Status  left lower extremity  -KD      Left Lower Extremity (Weight-bearing Status)  touch down weight-bearing  (TDWB)  (Significant)   -KD      Recorded by [KD] Dot Askew COTA/L 11/24/18 1141      Row Name 11/24/18 1007             Therapeutic Exercise    Upper Extremity Range of Motion (Therapeutic Exercise)  shoulder flexion/extension, bilateral;shoulder abduction/adduction, bilateral;shoulder horizontal abduction/adduction, bilateral;shoulder internal/external rotation, bilateral;elbow flexion/extension, bilateral;forearm supination/pronation, bilateral;wrist flexion/extension, bilateral  -KD      Weight/Resistance (Therapeutic Exercise)  2 pounds  -KD      Exercise Type (Therapeutic Exercise)  AROM (active range of motion)  -KD      Position (Therapeutic Exercise)  seated  -KD      Sets/Reps (Therapeutic Exercise)  1/20  -KD      Equipment (Therapeutic Exercise)  free weight, barbell  -KD      Expected Outcome (Therapeutic Exercise)  improve performance, transfer skills  -KD      Comment (Therapeutic Exercise)  RB'S PRN  -KD      Recorded by [KD] Dot Askew COTA/L 11/24/18 1141      Row Name 11/24/18 1007             Positioning and Restraints    Pre-Treatment Position  sitting in chair/recliner  -KD      Post Treatment Position  chair  -KD      In Chair  sitting;call light within reach;encouraged to call for assist;exit alarm on  -KD      Recorded by [KD] Dot Askew COTA/L 11/24/18 1141      Row Name 11/24/18 1007             Pain Scale: Numbers Pre/Post-Treatment    Pain Scale: Numbers, Pretreatment  0/10 - no pain  -KD      Pain Scale: Numbers, Post-Treatment  0/10 - no pain  -KD      Recorded by [KD] Dot Askew COTA/L 11/24/18 1141      Row Name                Wound 11/21/18 1538 Left hip incision;surgical    Wound - Properties Group Date first assessed: 11/21/18 [KM] Time first assessed: 1538 [KM] Present On Admission : no [KM] Side: Left [KM] Location: hip [KM] Type: incision;surgical [KM] Recorded by:  [KM] Martha Juan RN 11/21/18 1538    Row Name 11/24/18 1007             Outcome  Summary/Treatment Plan (OT)    Daily Summary of Progress (OT)  progress toward functional goals as expected  -KD      Plan for Continued Treatment (OT)  cont ot poc  -KD      Anticipated Discharge Disposition (OT)  home with home health;home with assist  -KD      Recorded by [KD] Dot Askew COTA/L 11/24/18 1141        User Key  (r) = Recorded By, (t) = Taken By, (c) = Cosigned By    Initials Name Effective Dates Discipline    KD Dot Askew MONTES/L 03/07/18 -  OT    KM Martha Juan, RN 05/21/18 -  Nurse        Wound 11/21/18 1538 Left hip incision;surgical (Active)   Closure Open to air 11/23/2018  8:15 PM   Periwound blistered 11/23/2018  8:15 PM     OT Rehab Goals     Row Name 11/24/18 1007             Bed Mobility Goal 1 (OT)    Activity/Assistive Device (Bed Mobility Goal 1, OT)  bed mobility activities, all  -KD      [place] Level/Cues Needed (Bed Mobility Goal 1, OT)  contact guard assist  -KD      Time Frame (Bed Mobility Goal 1, OT)  long term goal (LTG);by discharge  -KD      Progress/Outcomes (Bed Mobility Goal 1, OT)  goal not met  -KD         Transfer Goal 1 (OT)    Activity/Assistive Device (Transfer Goal 1, OT)  transfers, all  -KD      [place] Level/Cues Needed (Transfer Goal 1, OT)  standby assist  -KD      Time Frame (Transfer Goal 1, OT)  long term goal (LTG);by discharge  -KD      Progress/Outcome (Transfer Goal 1, OT)  goal not met  -KD         Bathing Goal 1 (OT)    Activity/Assistive Device (Bathing Goal 1, OT)  bathing skills, all  -KD      [place] Level/Cues Needed (Bathing Goal 1, OT)  contact guard assist  -KD      Time Frame (Bathing Goal 1, OT)  long term goal (LTG);by discharge  -KD      Progress/Outcomes (Bathing Goal 1, OT)  goal not met  -KD         Dressing Goal 1 (OT)    Activity/Assistive Device (Dressing Goal 1, OT)  dressing skills, all  -KD      [place]/Cues Needed (Dressing Goal 1, OT)  contact guard assist  -KD      Time Frame (Dressing Goal 1,  OT)  long term goal (LTG);by discharge  -KD      Progress/Outcome (Dressing Goal 1, OT)  goal not met  -KD         Patient Education Goal (OT)    Activity (Patient Education Goal, OT)  Independent with TDWB to L LE and home safety/fall prev.  -KD      [place]/Cues/Accuracy (Memory Goal 2, OT)  demonstrates adequately;verbalizes understanding  -KD      Time Frame (Patient Education Goal, OT)  long term goal (LTG);by discharge  -KD      Progress/Outcome (Patient Education Goal, OT)  goal not met  -KD        User Key  (r) = Recorded By, (t) = Taken By, (c) = Cosigned By    Initials Name Provider Type Discipline    Dot Blackwell COTA/L Occupational Therapy Assistant OT        Occupational Therapy Education     Title: PT OT SLP Therapies (Active)     Topic: Occupational Therapy (Active)     Point: ADL training (Active)     Description: Instruct learner(s) on proper safety adaptation and remediation techniques during self care or transfers.   Instruct in proper use of assistive devices.    Learning Progress Summary           Patient Acceptance, E,TB,D, NR by CS at 11/23/2018 11:40 AM                   Point: Home exercise program (Active)     Description: Instruct learner(s) on appropriate technique for monitoring, assisting and/or progressing therapeutic exercises/activities.    Learning Progress Summary           Patient Acceptance, E,TB,D, NR by CS at 11/23/2018 11:40 AM                   Point: Precautions (Active)     Description: Instruct learner(s) on prescribed precautions during self-care and functional transfers.    Learning Progress Summary           Patient Acceptance, E,TB,D, NR by CS at 11/23/2018 11:40 AM    Acceptance, E, VU,NR by RB at 11/22/2018 12:49 PM    Comment:  Edu on use of gait belt and non skid socks when OOB and no OOB without assist.                   Point: Body mechanics (Active)     Description: Instruct learner(s) on proper positioning and spine alignment during self-care,  functional mobility activities and/or exercises.    Learning Progress Summary           Patient Acceptance, E,TB,D, NR by CS at 11/23/2018 11:40 AM                               User Key     Initials Effective Dates Name Provider Type Discipline     06/15/16 -  Tayo Davis OT Occupational Therapist OT    JULIANN 03/07/18 -  Mary Kauffman COTA/BARRERA Occupational Therapy Assistant OT                OT Recommendation and Plan  Outcome Summary/Treatment Plan (OT)  Daily Summary of Progress (OT): progress toward functional goals as expected  Plan for Continued Treatment (OT): cont ot poc  Anticipated Discharge Disposition (OT): home with home health, home with assist  Therapy Frequency (OT Eval): daily  Daily Summary of Progress (OT): progress toward functional goals as expected  Outcome Measures     Row Name 11/24/18 1007 11/23/18 1600 11/23/18 1300       How much help from another person do you currently need...    Turning from your back to your side while in flat bed without using bedrails?  --  3  -TA  3  -TA    Moving from lying on back to sitting on the side of a flat bed without bedrails?  --  3  -TA  3  -TA    Moving to and from a bed to a chair (including a wheelchair)?  --  3  -TA  3  -TA    Standing up from a chair using your arms (e.g., wheelchair, bedside chair)?  --  3  -TA  3  -TA    Climbing 3-5 steps with a railing?  --  2  -TA  2  -TA    To walk in hospital room?  --  3  -TA  2  -TA    AM-PAC 6 Clicks Score  --  17  -TA  16  -TA       How much help from another is currently needed...    Putting on and taking off regular lower body clothing?  2  -KD  --  --    Bathing (including washing, rinsing, and drying)  2  -KD  --  --    Toileting (which includes using toilet bed pan or urinal)  2  -KD  --  --    Putting on and taking off regular upper body clothing  3  -KD  --  --    Taking care of personal grooming (such as brushing teeth)  4  -KD  --  --    Eating meals  4  -KD  --  --    Score  17  -KD  --  --        Functional Assessment    Outcome Measure Options  --  AM-PAC 6 Clicks Basic Mobility (PT)  -TA  AM-PAC 6 Clicks Basic Mobility (PT)  -TA    Row Name 11/23/18 1100 11/22/18 1038 11/22/18 0824       How much help from another person do you currently need...    Turning from your back to your side while in flat bed without using bedrails?  --  --  3  -BS    Moving from lying on back to sitting on the side of a flat bed without bedrails?  --  --  2  -BS    Moving to and from a bed to a chair (including a wheelchair)?  --  --  3  -BS    Standing up from a chair using your arms (e.g., wheelchair, bedside chair)?  --  --  3  -BS    Climbing 3-5 steps with a railing?  --  --  2  -BS    To walk in hospital room?  --  --  2  -BS    AM-PAC 6 Clicks Score  --  --  15  -BS       How much help from another is currently needed...    Putting on and taking off regular lower body clothing?  2  -CS  2  -RB  --    Bathing (including washing, rinsing, and drying)  2  -CS  2  -RB  --    Toileting (which includes using toilet bed pan or urinal)  2  -CS  2  -RB  --    Putting on and taking off regular upper body clothing  3  -CS  3  -RB  --    Taking care of personal grooming (such as brushing teeth)  4  -CS  4  -RB  --    Eating meals  4  -CS  4  -RB  --    Score  17  -CS  17  -RB  --       Functional Assessment    Outcome Measure Options  AM-PAC 6 Clicks Daily Activity (OT)  -CS  AM-PAC 6 Clicks Daily Activity (OT)  -RB  AM-PAC 6 Clicks Basic Mobility (PT)  -BS      User Key  (r) = Recorded By, (t) = Taken By, (c) = Cosigned By    Initials Name Provider Type    RB Tayo Davis, OT Occupational Therapist    Olesya Romero, PTA Physical Therapy Assistant    Dot Blackwell, MONTES/L Occupational Therapy Assistant    CS Mary Kauffman, MONTES/L Occupational Therapy Assistant    BS Tulio Duong, PT Physical Therapist           Time Calculation:   Time Calculation- OT     Row Name 11/24/18 1142             Time Calculation- OT     OT Start Time  1007  -KD      OT Stop Time  1030  -KD      OT Time Calculation (min)  23 min  -KD      Total Timed Code Minutes- OT  23 minute(s)  -KD      OT Received On  11/24/18  -KD        User Key  (r) = Recorded By, (t) = Taken By, (c) = Cosigned By    Initials Name Provider Type    Dot Blackwell COTA/L Occupational Therapy Assistant           Therapy Suggested Charges     Code   Minutes Charges    None           Therapy Charges for Today     Code Description Service Date Service Provider Modifiers Qty    79902605391 HC OT THER PROC EA 15 MIN 11/24/2018 Dot Askew COTA/L GO 2          OT G-codes  OT Professional Judgement Used?: Yes  OT Functional Scales Options: AM-PAC 6 Clicks Daily Activity (OT)  Score: 17  Functional Limitation: Self care  Self Care Current Status (): At least 40 percent but less than 60 percent impaired, limited or restricted  Self Care Goal Status (): At least 20 percent but less than 40 percent impaired, limited or restricted    BRITTNEY Segura  11/24/2018

## 2018-11-24 NOTE — DISCHARGE SUMMARY
[facility] Medicine Services  DISCHARGE SUMMARY       Date of Admission: 11/20/2018  Date of Discharge:  11/24/2018  Primary Care Physician: Debbi Jackson MD    Presenting Problem/History of Present Illness:  Closed displaced intertrochanteric fracture of left femur, initial encounter (CMS/[facility]) [S72.142A]  Closed displaced subtrochanteric fracture of left femur, initial encounter (CMS/[facility]) [S72.22XA]  Syncope, unspecified syncope type [R55]  Mass of right lung [R91.8]     Final Discharge Diagnoses:  Active Hospital Problems    Diagnosis   • **Closed displaced intertrochanteric fracture of left femur (CMS/[facility])   • Atherosclerosis   • Emphysema of lung (CMS/[facility])   • Hypertension       Consults:   Consults     Date and Time Order Name Status Description    11/20/2018 1441 Inpatient Orthopedic Surgery Consult Completed     11/20/2018 1251 Hospitalist (on-call MD unless specified)            Procedures Performed: Procedure(s):  FEMUR INTRAMEDULLARY NAILING/RODDING                Pertinent Test Results:   Lab Results (last 24 hours)     Procedure Component Value Units Date/Time    Comprehensive Metabolic Panel [[id number]]  (Abnormal) Collected:  11/24/18 0540    Specimen:  Blood Updated:  11/24/18 0629     Glucose 88 mg/dL      BUN 13 mg/dL      Creatinine 0.84 mg/dL      Sodium 132 mmol/L      Potassium 4.3 mmol/L      Chloride 103 mmol/L      CO2 23.0 mmol/L      Calcium 7.9 mg/dL      Total Protein 5.1 g/dL      Albumin 2.60 g/dL      ALT (SGPT) 17 U/L      AST (SGOT) 21 U/L      Alkaline Phosphatase 56 U/L      Total Bilirubin 1.2 mg/dL      eGFR Non African Amer 88 mL/min/1.73      Globulin 2.5 gm/dL      A/G Ratio 1.0 g/dL      BUN/Creatinine Ratio 15.5     Anion Gap 6.0 mmol/L     Narrative:       The MDRD GFR formula is only valid for adults with stable renal function between ages 18 and 70.    CBC & Differential [[id number]] Collected:  11/24/18 0540    Specimen:   Blood Updated:  11/24/18 0609    Narrative:       The following orders were created for panel order CBC & Differential.  Procedure                               Abnormality         Status                     ---------                               -----------         ------                     CBC Auto Differential[[id number]]        Abnormal            Final result                 Please view results for these tests on the individual orders.    CBC Auto Differential [[id number]]  (Abnormal) Collected:  11/24/18 0540    Specimen:  Blood Updated:  11/24/18 0609     WBC 6.58 10*3/mm3      RBC 2.99 10*6/mm3      Hemoglobin 9.2 g/dL      Hematocrit 26.4 %      MCV 88.3 fL      MCH 30.8 pg      MCHC 34.8 g/dL      RDW 11.9 %      RDW-SD 38.8 fl      MPV 9.0 fL      Platelets 167 10*3/mm3      Neutrophil % 63.4 %      Lymphocyte % 25.2 %      Monocyte % 8.4 %      Eosinophil % 2.4 %      Basophil % 0.3 %      Immature Grans % 0.3 %      Neutrophils, Absolute 4.17 10*3/mm3      Lymphocytes, Absolute 1.66 10*3/mm3      Monocytes, Absolute 0.55 10*3/mm3      Eosinophils, Absolute 0.16 10*3/mm3      Basophils, Absolute 0.02 10*3/mm3      Immature Grans, Absolute 0.02 10*3/mm3         Imaging Results (last 24 hours)     ** No results found for the last 24 hours. **          Chief Complaint on Day of Discharge: No complaints    Hospital Course:  This 83-year-old  male reported to the emergency department secondary to a fall at home.  Fall resulted in injury of the left hip, a minimally displaced intertrochanteric fracture. The patient underwent surgical repair with Dr. Hare on (11/21/2018).  Patient fell secondary to dizziness.     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 Ervin, this is unchanged since previous ultrasounds.  Dr. Ervin discussed the case with anesthesiology and determined the patient was stable and cleared for surgery.    As emergency  "department provider was concerned that there could be a AAA, CT angiogram of the aorta was performed.  There was no aortic aneurysm found on film.     Echocardiogram performed in 2016 demonstrated ejection fraction of 50-55% without regional abnormalities.  There was grade 1 diastolic dysfunction, without significant valvular dysfunction.    EKG did demonstrate a left bundle branch block, however patient has demonstrated this left bundle-branch block on previous EKGs, most recently available in January 2017.     Patient reports no seizure activity, no loss of bowel or bladder function, no focal weakness, no slurring of speech or drooping of face.  CT of the head demonstrates no acute findings.    Echocardiogram (11/22/2018)  showed a decrease in EF (36-40%) since prior study. The patient was started on a low-dose beta blocker and scheduled to follow up with Lora VASQUEZ in 2 weeks.     Follow with PCP in one week.  Follow with Dr. Hare in 10 days for suture removal.  Home health has been set up for physical and occupational therapy.      Condition on Discharge:  Stable    Physical Exam on Discharge:  /80 (BP Location: Left arm, Patient Position: Lying)   Pulse 88   Temp 98.6 °F (37 °C)   Resp 18   Ht 175.3 cm (69\")   Wt 79.5 kg (175 lb 4.8 oz)   SpO2 96%   BMI 25.89 kg/m²   Physical Exam   Constitutional: He is oriented to person, place, and time. He appears well-developed and well-nourished.   HENT:   Head: Normocephalic and atraumatic.   Eyes: EOM are normal. Pupils are equal, round, and reactive to light.   Neck: Normal range of motion. Neck supple.   Cardiovascular: Normal rate and regular rhythm.   Pulmonary/Chest: Effort normal and breath sounds normal.   Abdominal: Soft. Bowel sounds are normal.   Musculoskeletal: Normal range of motion.   Neurological: He is alert and oriented to person, place, and time.   Skin: Skin is warm and dry.   Psychiatric: He has a normal mood and affect. His " behavior is normal.     Discharge Disposition:  Home-Health Care [facility] [place]    Discharge Medications:     Discharge Medications      New Medications      Instructions Start Date   carvedilol 6.25 MG tablet  Commonly known as:  COREG   6.25 mg, Oral, 2 Times Daily With Meals      HYDROcodone-acetaminophen 7.5-325 MG per tablet  Commonly known as:  NORCO   1 tablet, Oral, Every 6 Hours PRN         Continue These Medications      Instructions Start Date   aspirin 81 MG chewable tablet   81 mg, Oral, Daily      latanoprost 0.005 % ophthalmic solution  Commonly known as:  XALATAN   1 drop, Left Eye, Nightly      lisinopril 20 MG tablet  Commonly known as:  PRINIVIL,ZESTRIL   20 mg, Oral, Daily      simvastatin 20 MG tablet  Commonly known as:  ZOCOR   20 mg, Oral, Nightly, Bedtime        varenicline 0.5 MG X 11 & 1 MG X 42 tablet  Commonly known as:  CHANTIX STARTING MONTH HELIO   Take 0.5 mg one daily on days 1-3 and and 0.5 mg twice daily on days 4-7.Then 1 mg twice daily for a total of 12 weeks.      varenicline 1 MG tablet  Commonly known as:  CHANTIX CONTINUING MONTH HELIO   1 mg, Oral, 2 Times Daily      VISION-HECTOR PRESERVE PO   1 capsule, Oral, 2 Times Daily, PreserVision Lutein 226 mg-200 unit-5 mg-0.8 mg capsule             Discharge Diet:   Diet Instructions     Diet: Cardiac      Discharge Diet:  Cardiac          Activity at Discharge:   Activity Instructions     Activity as Tolerated            Discharge Care Plan/Instructions: As above.     Follow-up Appointments:   Future Appointments   Date Time Provider Department Center   1/30/2019  2:20 PM Magaly Gallegos APRN [facility] None   2/11/2019 11:30 AM Debbi Jackson MD [facility]  [facility] None           This document has been electronically signed by CHRISTINA Mi on November 24, 2018 1:40 PM        Time: Greater than 30 minutes.

## 2018-11-25 ENCOUNTER — READMISSION MANAGEMENT (OUTPATIENT)
Dept: CALL CENTER | Facility: HOSPITAL | Age: 83
End: 2018-11-25

## 2018-11-25 NOTE — THERAPY DISCHARGE NOTE
Acute Care - Occupational Therapy Discharge Summary  [facility]     Patient Name: Maciej Funk  : 1935  MRN: [id number]    Today's Date: 2018  Onset of Illness/Injury or Date of Surgery: 18    Date of Referral to OT: 18  Referring Physician: TRESA Villatoro MD.      Admit Date: 2018        OT Recommendation and Plan    Visit Dx:    ICD-10-CM ICD-9-CM   1. Closed displaced intertrochanteric fracture of left femur, initial encounter (CMS/[facility]) S72.142A 820.21   2. Mass of right lung R91.8 786.6   3. Closed displaced subtrochanteric fracture of left femur, initial encounter (CMS/[facility]) S72.22XA 820.22   4. Syncope, unspecified syncope type R55 780.2   5. Impaired functional mobility and activity tolerance Z74.09 V49.89   6. Impaired mobility and activities of daily living Z74.09 799.89   7. Atherosclerosis I70.90 440.9   8. History of adenomatous polyp of colon Z86.010 V12.72   9. Mixed hyperlipidemia E78.2 272.2   10. Tobacco dependence syndrome F17.200 305.1   11. Nicotine dependence, cigarettes, with other nicotine-induced disorders F17.218 292.89   12. Carotid stenosis, bilateral I65.23 433.10     433.30   13. 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 Follow-up surgery care Z09 V67.00   15. Pulmonary hypertension (CMS/HCC) I27.20 416.8                   Outcome Measures     Row Name 18 1007 18 0841 18 1600       How much help from another person do you currently need...    Turning from your back to your side while in flat bed without using bedrails?  --  3  -JW  3  -TA    Moving from lying on back to sitting on the side of a flat bed without bedrails?  --  3  -JW  3  -TA    Moving to and from a bed to a chair (including a wheelchair)?  --  3  -JW  3  -TA    Standing up from a chair using your arms (e.g., wheelchair, bedside chair)?  --  3  -JW  3  -TA    Climbing 3-5 steps with a railing?  --  3  -JW  2  -TA    To walk in hospital room?  --  3  -JW  3  -TA     [facility] 6 Clicks Score  --  18  -JW  17  -TA       How much help from another is currently needed...    Putting on and taking off regular lower body clothing?  2  -KD  --  --    Bathing (including washing, rinsing, and drying)  2  -KD  --  --    Toileting (which includes using toilet bed pan or urinal)  2  -KD  --  --    Putting on and taking off regular upper body clothing  3  -KD  --  --    Taking care of personal grooming (such as brushing teeth)  4  -KD  --  --    Eating meals  4  -KD  --  --    Score  17  -KD  --  --       Functional Assessment    Outcome Measure Options  --  AM-[facility] 6 Clicks Basic Mobility (PT)  -JW  [facility] 6 Clicks Basic Mobility (PT)  -TA    Row Name 11/23/18 1300 11/23/18 1100 11/22/18 1038       How much help from another person do you currently need...    Turning from your back to your side while in flat bed without using bedrails?  3  -TA  --  --    Moving from lying on back to sitting on the side of a flat bed without bedrails?  3  -TA  --  --    Moving to and from a bed to a chair (including a wheelchair)?  3  -TA  --  --    Standing up from a chair using your arms (e.g., wheelchair, bedside chair)?  3  -TA  --  --    Climbing 3-5 steps with a railing?  2  -TA  --  --    To walk in hospital room?  2  -TA  --  --    [facility] 6 Clicks Score  16  -TA  --  --       How much help from another is currently needed...    Putting on and taking off regular lower body clothing?  --  2  -CS  2  -RB    Bathing (including washing, rinsing, and drying)  --  2  -CS  2  -RB    Toileting (which includes using toilet bed pan or urinal)  --  2  -CS  2  -RB    Putting on and taking off regular upper body clothing  --  3  -CS  3  -RB    Taking care of personal grooming (such as brushing teeth)  --  4  -CS  4  -RB    Eating meals  --  4  -CS  4  -RB    Score  --  17  -CS  17  -RB       Functional Assessment    Outcome Measure Options  [facility] 6 Clicks Basic Mobility (PT)  -TA  -[facility] 6 Clicks Daily Activity (OT)   -CS  [facility] 6 Clicks Daily Activity (OT)  -RB      User Key  (r) = Recorded By, (t) = Taken By, (c) = Cosigned By    Initials Name Provider Type    RB Tayo Davis, OTTONIEL Occupational Therapist    Olesya Romero, PTA Physical Therapy Assistant    JW Johny Yeager, PTA Physical Therapy Assistant    Dot Blackwell, MONTES/L Occupational Therapy Assistant    Mary Sotomayor, MONTES/L Occupational Therapy Assistant          Therapy Suggested Charges     Code   Minutes Charges    None                 OT Discharge Summary  Anticipated Discharge Disposition (OT): home with home health, home with assist  Reason for Discharge: Discharge from facility, Per MD order  Outcomes Achieved: Refer to plan of care for updates on goals achieved  Discharge Destination: Home with home health      Diann Broussard, OT  11/25/2018

## 2018-11-25 NOTE — OUTREACH NOTE
Prep Survey      Responses   Facility patient discharged from?  [place]   Is patient eligible?  Yes   Discharge diagnosis  Closed displaced intertrochanteric fracture of left femur, atherosclerosis, emphysema of lung, HTN, s/p Femur intramedullary nailing/rodding   Does the patient have one of the following disease processes/diagnoses(primary or secondary)?  General Surgery   Does the patient have Home health ordered?  Yes   What is the Home health agency?   [facility]   Is there a DME ordered?  No   What DME was ordered?  Pt has cane and walker at home   Comments regarding appointments  Pt. to schedule follow up appointments   Prep survey completed?  Yes          Gissell Dumont RN

## 2018-11-26 NOTE — PAYOR COMM NOTE
"Maciej Peña Romario (83 y.o. Male)     Date of Birth Social Security Number Address Home Phone MRN    1935  [address] [address] [phone] [id number]    Sikh Marital Status          Presybeterian        Admission Date Admission Type Admitting Provider Attending Provider Department, Room/Bed    11/20/18 Emergency Jhony Kirby MD  [address], 377/1    Discharge Date Discharge Disposition Discharge Destination        11/24/2018 Home-Health Care [facility]              Attending Provider:  (none)   Allergies:  No Known Allergies    Isolation:  None   Infection:  None   Code Status:  Prior    Ht:  175.3 cm (69\")   Wt:  79.5 kg (175 lb 4.8 oz)    Admission Cmt:  None   Principal Problem:  Closed displaced intertrochanteric fracture of left femur (CMS/HCC) [S72.142A]                 Active Insurance as of 11/20/2018     Primary Coverage     Payor Plan Insurance Group Employer/Plan Group    MEDICARE MEDICARE A & B      Payor Plan Address Payor Plan Phone Number Payor Plan Fax Number Effective Dates    [address] [phone]  3/1/2002 - None Entered    [facility] [id number]       Subscriber Name Subscriber Birth Date Member ID       MACIEJ PEÑA ROMARIO 1935 [id number]           Secondary Coverage     Payor Plan Insurance Group Employer/Plan Group    [facility]     Payor Plan Address Payor Plan Phone Number Payor Plan Fax Number Effective Dates    [address] [address]   1/1/2018 - None Entered    [facility] [address]       Subscriber Name Subscriber Birth Date Member ID       MACIEJ PEÑA ROMARIO 1935 [id number]                 Emergency Contacts      (Rel.) Home Phone Work Phone Mobile Phone    Berhane Luong (Son) [phone] -- [phone]               Discharge Summary      Kathy Cam APRN at 11/24/2018  1:39 PM     Attestation signed by Lorenzo Kirby MD at 11/26/2018 11:23 AM    I agree with the below note and assessment and " plan.                      [facility] Medicine Services  DISCHARGE SUMMARY       Date of Admission: 11/20/2018  Date of Discharge:  11/24/2018  Primary Care Physician: Debbi Jackson MD    Presenting Problem/History of Present Illness:  Closed displaced intertrochanteric fracture of left femur, initial encounter (CMS/[facility]) [S72.142A]  Closed displaced subtrochanteric fracture of left femur, initial encounter (CMS/[facility]) [S72.22XA]  Syncope, unspecified syncope type [R55]  Mass of right lung [R91.8]     Final Discharge Diagnoses:  Active Hospital Problems    Diagnosis   • **Closed displaced intertrochanteric fracture of left femur (CMS/[facility])   • Atherosclerosis   • Emphysema of lung (CMS/[facility])   • Hypertension       Consults:   Consults     Date and Time Order Name Status Description    11/20/2018 1441 Inpatient Orthopedic Surgery Consult Completed     11/20/2018 1251 Hospitalist (on-call MD unless specified)            Procedures Performed: Procedure(s):  FEMUR INTRAMEDULLARY NAILING/RODDING                Pertinent Test Results:   Lab Results (last 24 hours)     Procedure Component Value Units Date/Time    Comprehensive Metabolic Panel [[id number]]  (Abnormal) Collected:  11/24/18 0540    Specimen:  Blood Updated:  11/24/18 0629     Glucose 88 mg/dL      BUN 13 mg/dL      Creatinine 0.84 mg/dL      Sodium 132 mmol/L      Potassium 4.3 mmol/L      Chloride 103 mmol/L      CO2 23.0 mmol/L      Calcium 7.9 mg/dL      Total Protein 5.1 g/dL      Albumin 2.60 g/dL      ALT (SGPT) 17 U/L      AST (SGOT) 21 U/L      Alkaline Phosphatase 56 U/L      Total Bilirubin 1.2 mg/dL      eGFR Non African Amer 88 mL/min/1.73      Globulin 2.5 gm/dL      A/G Ratio 1.0 g/dL      BUN/Creatinine Ratio 15.5     Anion Gap 6.0 mmol/L     Narrative:       The MDRD GFR formula is only valid for adults with stable renal function between ages 18 and 70.    CBC & Differential [[id number]] Collected:  11/24/18  0540    Specimen:  Blood Updated:  11/24/18 0609    Narrative:       The following orders were created for panel order CBC & Differential.  Procedure                               Abnormality         Status                     ---------                               -----------         ------                     CBC Auto Differential[[id number]]        Abnormal            Final result                 Please view results for these tests on the individual orders.    CBC Auto Differential [[id number]]  (Abnormal) Collected:  11/24/18 0540    Specimen:  Blood Updated:  11/24/18 0609     WBC 6.58 10*3/mm3      RBC 2.99 10*6/mm3      Hemoglobin 9.2 g/dL      Hematocrit 26.4 %      MCV 88.3 fL      MCH 30.8 pg      MCHC 34.8 g/dL      RDW 11.9 %      RDW-SD 38.8 fl      MPV 9.0 fL      Platelets 167 10*3/mm3      Neutrophil % 63.4 %      Lymphocyte % 25.2 %      Monocyte % 8.4 %      Eosinophil % 2.4 %      Basophil % 0.3 %      Immature Grans % 0.3 %      Neutrophils, Absolute 4.17 10*3/mm3      Lymphocytes, Absolute 1.66 10*3/mm3      Monocytes, Absolute 0.55 10*3/mm3      Eosinophils, Absolute 0.16 10*3/mm3      Basophils, Absolute 0.02 10*3/mm3      Immature Grans, Absolute 0.02 10*3/mm3         Imaging Results (last 24 hours)     ** No results found for the last 24 hours. **          Chief Complaint on Day of Discharge: No complaints    Hospital Course:  This 83-year-old  male reported to the emergency department secondary to a fall at home.  Fall resulted in injury of the left hip, a minimally displaced intertrochanteric fracture. The patient underwent surgical repair with Dr. Hare on (11/21/2018).  Patient fell secondary to dizziness.     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 Ervin, this is unchanged since previous ultrasounds.  Dr. Ervin discussed the case with anesthesiology and determined the patient was stable and cleared for  "surgery.    As emergency department provider was concerned that there could be a AAA, CT angiogram of the aorta was performed.  There was no aortic aneurysm found on film.     Echocardiogram performed in 2016 demonstrated ejection fraction of 50-55% without regional abnormalities.  There was grade 1 diastolic dysfunction, without significant valvular dysfunction.    EKG did demonstrate a left bundle branch block, however patient has demonstrated this left bundle-branch block on previous EKGs, most recently available in January 2017.     Patient reports no seizure activity, no loss of bowel or bladder function, no focal weakness, no slurring of speech or drooping of face.  CT of the head demonstrates no acute findings.    Echocardiogram (11/22/2018)  showed a decrease in EF (36-40%) since prior study. The patient was started on a low-dose beta blocker and scheduled to follow up with Lora VASQUEZ in 2 weeks.     Follow with PCP in one week.  Follow with Dr. Hare in 10 days for suture removal.  Home health has been set up for physical and occupational therapy.      Condition on Discharge:  Stable    Physical Exam on Discharge:  /80 (BP Location: Left arm, Patient Position: Lying)   Pulse 88   Temp 98.6 °F (37 °C)   Resp 18   Ht 175.3 cm (69\")   Wt 79.5 kg (175 lb 4.8 oz)   SpO2 96%   BMI 25.89 kg/m²    Physical Exam   Constitutional: He is oriented to person, place, and time. He appears well-developed and well-nourished.   HENT:   Head: Normocephalic and atraumatic.   Eyes: EOM are normal. Pupils are equal, round, and reactive to light.   Neck: Normal range of motion. Neck supple.   Cardiovascular: Normal rate and regular rhythm.   Pulmonary/Chest: Effort normal and breath sounds normal.   Abdominal: Soft. Bowel sounds are normal.   Musculoskeletal: Normal range of motion.   Neurological: He is alert and oriented to person, place, and time.   Skin: Skin is warm and dry.   Psychiatric: He has a " normal mood and affect. His behavior is normal.     Discharge Disposition:  Home-Health Care [facility] [place]    Discharge Medications:     Discharge Medications      New Medications      Instructions Start Date   carvedilol 6.25 MG tablet  Commonly known as:  COREG   6.25 mg, Oral, 2 Times Daily With Meals      HYDROcodone-acetaminophen 7.5-325 MG per tablet  Commonly known as:  NORCO   1 tablet, Oral, Every 6 Hours PRN         Continue These Medications      Instructions Start Date   aspirin 81 MG chewable tablet   81 mg, Oral, Daily      latanoprost 0.005 % ophthalmic solution  Commonly known as:  XALATAN   1 drop, Left Eye, Nightly      lisinopril 20 MG tablet  Commonly known as:  PRINIVIL,ZESTRIL   20 mg, Oral, Daily      simvastatin 20 MG tablet  Commonly known as:  ZOCOR   20 mg, Oral, Nightly, Bedtime        varenicline 0.5 MG X 11 & 1 MG X 42 tablet  Commonly known as:  CHANTIX STARTING MONTH HELIO   Take 0.5 mg one daily on days 1-3 and and 0.5 mg twice daily on days 4-7.Then 1 mg twice daily for a total of 12 weeks.      varenicline 1 MG tablet  Commonly known as:  CHANTIX CONTINUING MONTH HELIO   1 mg, Oral, 2 Times Daily      VISION-HECTOR PRESERVE PO   1 capsule, Oral, 2 Times Daily, PreserVision Lutein 226 mg-200 unit-5 mg-0.8 mg capsule             Discharge Diet:   Diet Instructions     Diet: Cardiac      Discharge Diet:  Cardiac          Activity at Discharge:   Activity Instructions     Activity as Tolerated            Discharge Care Plan/Instructions: As above.     Follow-up Appointments:   Future Appointments   Date Time Provider Department Center   1/30/2019  2:20 PM [facility]   2/11/2019 11:30 AM Debbi Jackson MD [facility]  [facility] None           This document has been electronically signed by CHRISTINA Mi on November 24, 2018 1:40 PM        Time: Greater than 30 minutes.               Electronically signed by Lorenzo Kirby MD at 11/26/2018 11:23 AM

## 2018-11-28 ENCOUNTER — READMISSION MANAGEMENT (OUTPATIENT)
Dept: CALL CENTER | Facility: HOSPITAL | Age: 83
End: 2018-11-28

## 2018-11-28 NOTE — OUTREACH NOTE
General Surgery Week 1 Survey      Responses   Facility patient discharged from?  [facility]   Does the patient have one of the following disease processes/diagnoses(primary or secondary)?  General Surgery   Is there a successful TCM telephone encounter documented?  No   Week 1 attempt successful?  Yes   Call start time  1427   Call end time  1439   Discharge diagnosis  Closed displaced intertrochanteric fracture of left femur, atherosclerosis, emphysema of lung, HTN, s/p Femur intramedullary nailing/rodding   Is patient permission given to speak with other caregiver?  Yes   List who call center can speak with  wife   Person spoke with today (if not patient) and relationship  wife   Meds reviewed with patient/caregiver?  Yes   Is the patient having any side effects they believe may be caused by any medication additions or changes?  No   Does the patient have all medications related to this admission filled (includes all antibiotics, pain medications, etc.)  Yes   Is the patient taking all medications as directed (includes completed medication regime)?  Yes   Does the patient have a follow up appointment scheduled with their surgeon?  Yes   Has the patient kept scheduled appointments due by today?  N/A   What is the Home health agency?   [facility]   Has home health visited the patient within 72 hours of discharge?  Yes   What DME was ordered?  Pt has cane and walker at home   DME comments  Pt using walker to ambulate   Psychosocial issues?  No   Comments  Pain is being well controlled by pain med per wife. Denies any needs.   Did the patient receive a copy of their discharge instructions?  Yes   Nursing interventions  Reviewed instructions with patient   What is the patient's perception of their health status since discharge?  Improving   Nursing interventions  Nurse provided patient education   Is the patient /caregiver able to teach back basic post-op care?  Continue use of incentive spirometry at least 1 week post  discharge, Drive as instructed by MD in discharge instructions, Lifting as instructed by MD in discharge instructions, No tub bath, swimming, or hot tub until instructed by MD   Is the patient/caregiver able to teach back signs and symptoms of incisional infection?  Increased redness, swelling or pain at the incisonal site, Incisional warmth, Fever   Is the patient/caregiver able to teach back steps to recovery at home?  Set small, achievable goals for return to baseline health, Eat a well-balance diet, Practice good oral hygiene   Is the patient/caregiver able to teach back the hierarchy of who to call/visit for symptoms/problems? PCP, Specialist, Home health nurse, Urgent Care, ED, 911  Yes   Week 1 call completed?  Yes          Janee Nazario RN

## 2018-11-30 DIAGNOSIS — S72.142A CLOSED DISPLACED INTERTROCHANTERIC FRACTURE OF LEFT FEMUR, INITIAL ENCOUNTER (HCC): Primary | ICD-10-CM

## 2018-12-03 ENCOUNTER — OFFICE VISIT (OUTPATIENT)
Dept: ORTHOPEDIC SURGERY | Facility: CLINIC | Age: 83
End: 2018-12-03

## 2018-12-03 VITALS — HEIGHT: 69 IN | BODY MASS INDEX: 24.88 KG/M2 | WEIGHT: 168 LBS

## 2018-12-03 DIAGNOSIS — S72.142D CLOSED DISPLACED INTERTROCHANTERIC FRACTURE OF LEFT FEMUR WITH ROUTINE HEALING, SUBSEQUENT ENCOUNTER: Primary | ICD-10-CM

## 2018-12-03 PROCEDURE — 99024 POSTOP FOLLOW-UP VISIT: CPT | Performed by: ORTHOPAEDIC SURGERY

## 2018-12-03 RX ORDER — HYDROCODONE BITARTRATE AND ACETAMINOPHEN 7.5; 325 MG/1; MG/1
1 TABLET ORAL EVERY 6 HOURS PRN
Qty: 80 TABLET | Refills: 0 | Status: SHIPPED | OUTPATIENT
Start: 2018-12-03 | End: 2019-01-30

## 2018-12-03 NOTE — PROGRESS NOTES
Maciej Funk is a 83 y.o. male is s/p       Chief Complaint   Patient presents with   • Left Hip - Post-op       HISTORY OF PRESENT ILLNESS: Left hip surgery done on 11/20/2018. Repeat xrays done today  He is currently doing PT     83 y post op complains of pain requests narcotic medicines.      No Known Allergies      Current Outpatient Medications:   •  aspirin 81 MG chewable tablet, Chew 81 mg Daily., Disp: , Rfl:   •  carvedilol (COREG) 6.25 MG tablet, Take 1 tablet by mouth 2 (Two) Times a Day With Meals., Disp: 60 tablet, Rfl: 3  •  latanoprost (XALATAN) 0.005 % ophthalmic solution, Administer 1 drop into the left eye Every Night., Disp: , Rfl:   •  lisinopril (PRINIVIL,ZESTRIL) 20 MG tablet, Take 1 tablet by mouth Daily., Disp: 90 tablet, Rfl: 3  •  Multiple Vitamins-Minerals (VISION-HECTOR PRESERVE PO), Take 1 capsule by mouth 2 (Two) Times a Day. PreserVision Lutein 226 mg-200 unit-5 mg-0.8 mg capsule , Disp: , Rfl:   •  simvastatin (ZOCOR) 20 MG tablet, Take 1 tablet by mouth Every Night. Bedtime, Disp: 90 tablet, Rfl: 3  •  HYDROcodone-acetaminophen (NORCO) 7.5-325 MG per tablet, Take 1 tablet by mouth Every 6 (Six) Hours As Needed for Moderate Pain ., Disp: 80 tablet, Rfl: 0    Current Outpatient Medications on File Prior to Visit   Medication Sig Dispense Refill   • aspirin 81 MG chewable tablet Chew 81 mg Daily.     • carvedilol (COREG) 6.25 MG tablet Take 1 tablet by mouth 2 (Two) Times a Day With Meals. 60 tablet 3   • latanoprost (XALATAN) 0.005 % ophthalmic solution Administer 1 drop into the left eye Every Night.     • lisinopril (PRINIVIL,ZESTRIL) 20 MG tablet Take 1 tablet by mouth Daily. 90 tablet 3   • Multiple Vitamins-Minerals (VISION-HECTOR PRESERVE PO) Take 1 capsule by mouth 2 (Two) Times a Day. PreserVision Lutein 226 mg-200 unit-5 mg-0.8 mg capsule      • simvastatin (ZOCOR) 20 MG tablet Take 1 tablet by mouth Every Night. Bedtime 90 tablet 3   • [DISCONTINUED]  HYDROcodone-acetaminophen (NORCO) 7.5-325 MG per tablet Take 1 tablet by mouth Every 6 (Six) Hours As Needed for Moderate Pain . 12 tablet 0   • [DISCONTINUED] varenicline (CHANTIX CONTINUING MONTH PAK) 1 MG tablet Take 1 tablet by mouth 2 (Two) Times a Day. 60 tablet 3   • [DISCONTINUED] varenicline (CHANTIX STARTING MONTH PAK) 0.5 MG X 11 & 1 MG X 42 tablet Take 0.5 mg one daily on days 1-3 and and 0.5 mg twice daily on days 4-7.Then 1 mg twice daily for a total of 12 weeks. 53 tablet 0     No current facility-administered medications on file prior to visit.          No fevers or chills.  No nausea or vomiting.      PHYSICAL EXAMINATION:       Maciej Funk is a 83 y.o. male    Patient is awake and alert, answers questions appropriately and is in no apparent distress.    GAIT:     []  Normal  []  Antalgic    Assistive device: []  None  [x]  Walker     []  Crutches  []  Cane     []  Wheelchair  []  Stretcher    Ortho Exam     Ambulating with walker, incisions healing, no erythema, no drainage.  Swelling of lower leg.      Xr Femur 2 View Left    Result Date: 11/20/2018  Narrative: EXAM:  Radiograph(s), Osseous       REGION:   Femur  SIDE:     Left   VIEWS:   2         INDICATION:    femur, S72.142A Displaced intertrochanteric fracture of left femur, initial encounter for closed fracture R91.8 Other nonspecific abnormal finding of lung field S72.22XA Displaced subtrochanteric fracture of left femur, initial encounter for closed fracture R55 Syncope and collapse   COMPARISON:    none          FINDINGS:       Minimally displaced intertrochanteric fracture. .        Impression: CONCLUSION:       1. Minimally displaced intertrochanteric fracture.                   Electronically signed by:  TRE Giraldo MD  11/20/2018 1:25 PM [facility] Workstation: [id number]    Ct Head Without Contrast    Result Date: 11/20/2018  Narrative: CT Head Without Contrast History: Syncope Axial scans of the brain were obtained without  intravenous contrast.  Coronal and sagital reconstructions were preformed. This exam was performed according to our departmental dose-optimization program, which includes automated exposure control, adjustment of the mA and/or kV according to patient size and/or use of iterative reconstruction technique. DLP: 908.20 Comparison: April 24, 2017 Bone windows are unremarkable. The visualized paranasal sinuses are unremarkable. No acute process. Cerebral atrophy. Minimal small vessel disease. No hemorrhage. No mass. No abnormal areas of increased attenuation. No midline shift. No abnormal extra-axial fluid collections.     Impression: CONCLUSION: No acute process. Cerebral atrophy. Minimal small vessel disease. [id number] Electronically signed by:  Jose Handley MD  11/20/2018 11:54 AM CST Workstation: [id number]    Xr Chest 1 View    Result Date: 11/20/2018  Narrative: EXAM:         Radiograph(s), Chest VIEWS:   Frontal  ; 1     DATE/TIME:  11/20/2018 11:43 AM CST            INDICATION:   pre op, hip fracture  COMPARISON:  CXR: none         FINDINGS:         - lines/tubes:    none   - cardiac:         size within normal limits       - mediastinum: Fullness in the right paratracheal and right suprahilar regions, suspicious for the presence of a mass lesion.       - lungs:         no focal air space process, pulmonary interstitial edema, nodule(s)/mass           - pleura:         no evidence of  fluid                - osseous:         unremarkable for age                - misc.:        Impression: CONCLUSION:    1. Mass lesion suspected present in the right suprahilar region. Correlation with contrast-enhanced CT recommended.                                                  Electronically signed by:  TRE Giraldo MD  11/20/2018 11:44 AM CST Workstation: [id number]    Us Carotid Bilateral    Result Date: 11/20/2018  Narrative: .   EXAMINATION:  Ultrasound, carotid    CLINICAL INDICATION / HISTORY:  Preoperative evaluation for  hip repair   COMPARISON:  none  TECHNIQUE:  Gray scale, color-flow Doppler, and spectral wave analysis images    FINDINGS:     (PSV peak systolic velocity  (cm/s) / EDV end diastolic velocity (cm/s)                                     RIGHT  (cm/s)                             -------------------                      CCA                        76/21    ICA (prox)               98/31   ICA (mid)               142/45 * ICA (dist)               59/28   ECA                       124/10  vertebral                 29/14  ICA/CCA                1.9/2.2                                     LEFT   (cm/s)                           ----------------- CCA                        104/35    ICA (prox)               79/23   ICA (mid)               90/34 ICA (dist)               79/31   ECA                       95/19  vertebral                 144/51  * ICA/CCA                0.9/1.0   Subjective evaluation of the images:   - Right:  The right mid ICA displays a calcified plaque with elevated flow velocities estimating stenosis of 50-69%.   - Left:     No focal lesion associated with elevated flow velocities associated with the common or internal carotid arteries. The vertebral artery has elevated flow velocities as above suggesting stenosis in the 50-69% range.      .    Impression:  CONCLUSION: 1.  The right mid ICA displays a calcified plaque with estimated stenosis of 50-69%. 2. Elevated flow velocities in the left vertebral artery suggesting estimated stenosis of 50-69%.          Electronically signed by:  TRE Giraldo MD  11/20/2018 6:34 PM [facility] Workstation: [id number]    Ct Angiogram Aorta    Result Date: 11/20/2018  Narrative: Procedure: CT angiogram abdominal aorta with contrast Reason for exam: Syncope, suspect AAA. FINDINGS: Axial computer tomography sequential imaging was performed from the diaphragms through the symphysis pubis after dynamic bolus contrast injection. Sagittal and coronal reformation was performed. This  exam was performed according to our departmental dose optimization program, which includes automated exposure control, adjustment of the mA and/or KV according to patient size and/or use of iterative reconstruction technique. 3-D vascular reconstruction of the abdominal aorta was performed. CTA findings: Proximal abdominal aortic ectasia measuring 2.5 x 2.4 cm in AP and transverse dimension. No evidence of abdominal aortic aneurysm. No evidence of aortic dissection. Abdominal aorta soft and calcified atherosclerotic plaque are seen none of which appear hemodynamically significant. The celiac trunk is normal. Proximal superior mesenteric artery partially calcified atherosclerotic plaque which is causing 5-10% diameter stenosis. The superior mesenteric artery is otherwise normal. The inferior mesenteric artery is normal. The right renal artery is normal. The left renal artery is normal. The right common iliac artery has small foci of calcified atherosclerotic plaque none of which appear hemodynamically significant. The right internal iliac artery has proximal small calcified atherosclerotic plaque not hemodynamically significant. The right external iliac artery is normal. Proximal right common femoral artery partially calcified atherosclerotic plaque which is causing 30% diameter stenosis. Otherwise the right common femoral artery is unremarkable. Proximal right deep profunda artery and right superficial femoral artery are normal. The left common iliac artery distal soft atherosclerotic plaque which is causing 40-50% diameter stenosis. The left internal iliac artery has a few proximal calcified atherosclerotic plaque which are not hemodynamically significant. The left external iliac artery appears normal. The left common femoral artery has soft atherosclerotic plaque which is causing 40-50% diameter stenosis. Proximal left deep profunda artery soft and calcified atherosclerotic plaque which is causing significant  stenosis of 80% or greater. Proximal left superficial femoral artery appears normal. The lung bases are unremarkable. Multiple left lobe of liver and single posterior segment right lobe of liver small benign simple hepatic cyst. Otherwise the liver is unremarkable. The gallbladder is normal. The biliary system is normal. The pancreas is normal. The spleen is normal. Bilateral adrenal glands are normal. Right kidney mid zone small subcentimeter simple renal cortical cyst. Right kidney lower pole circular hypodense lesion which has Hounsfield units 28. This most likely represents benign hyperdense cyst. However, recommend follow-up CT in 3-6  months to document stability. Otherwise right kidney and ureter are normal. Left kidney upper pole simple renal cortical cysts which measures 2.9 cm in greatest diameter. Right kidney mid zone small subcentimeter simple renal cortical cyst. Otherwise left kidney and ureter are normal. The bladder is normal. The prostate gland appears enlarged and is impinging upon the base of bladder. Multiple sigmoid colon diverticula. Multiple descending colon diverticula. Otherwise hollow viscera is unremarkable. No lymphadenopathy in the abdomen or the pelvis. Left femur comminuted intertrochanteric fracture. Otherwise no acute osseous abnormality.     Impression: 1.  CTA findings as described above. 2.  Multiple liver benign hepatic cyst. 3.  Right kidney lower pole circular hypodense lesion which has Hounsfield units of 28. This most likely represents benign hyperdense cyst. However, recommend follow-up CT in 3-6 months to document stability. 4.  Additional bilateral kidney simple renal cortical cyst. 5.  Mildly enlarged prostate gland may represent changes from benign prostatic hypertrophy versus prostate malignancy. Recommend clinical correlation. 6.  Left femur comminuted intertrochanteric fracture. 7.  Colonic diverticulosis. Electronically signed by:  Tulio Melendrez MD  11/20/2018 12:51 PM  CST Workstation: [id number]    Xr Hip With Or Without Pelvis 2 - 3 View Left    Result Date: 12/3/2018  Narrative: Ordering Provider:  Wu Hare MD Ordering Diagnosis/Indication:  Closed displaced intertrochanteric fracture of left femur, initial encounter (CMS/[facility]) Procedure:  XR HIP W OR WO PELVIS 2-3 VIEW LEFT Exam Date:  12/3/18 RELEVANT PRIOR IMAGES:  XR Hip With or Without Pelvis 2 - 3 View Left 11/20/2018 [id number] Final COMPARISON:  Todays X-rays were compared to previous images dated 11/20/2018.     Impression:  left femur intertrochanteric fracture, alignment good, fracture appliance in excellent position, bone quality is poor Bone quality: poor Alignment: good alignment proximal femur Fracture: intertrochanteric femur fracture of left femur Soft tissue: normal.     Xr Hip With Or Without Pelvis 2 - 3 View Left    Result Date: 11/20/2018  Narrative: Procedure: XR HIP 2 OR MORE VIEWS UNILATERAL WITH PELVIS. History: fall, left hip pain, inability to bear weight. Comparison: None Findings: Frontal view of the pelvis and two views of the left hip were obtained, demonstrating a displaced, possibly comminuted, medially angulated intertrochanteric fracture of the left proximal femur. No other fractures are seen. There are degenerative changes of the lower lumbar spine. The bones appear demineralized.     Impression: Impression: Displaced, possibly comminuted, medially angulated intertrochanteric fracture of the left proximal femur. Electronically signed by:  Tone Olivas MD  11/20/2018 11:47 AM CST Workstation: [id number]          ASSESSMENT:    Diagnoses and all orders for this visit:    Closed displaced intertrochanteric fracture of left femur with routine healing, subsequent encounter    Other orders  -     HYDROcodone-acetaminophen (NORCO) 7.5-325 MG per tablet; Take 1 tablet by mouth Every 6 (Six) Hours As Needed for Moderate Pain .          PLAN    norco is refilled, I cautioned him that  narcotic medicines may lead to addiction, possible overdose can cause coma.  Xray left hip      Wu Hare MD

## 2018-12-06 ENCOUNTER — DOCUMENTATION (OUTPATIENT)
Dept: INTERNAL MEDICINE | Facility: HOSPITAL | Age: 83
End: 2018-12-06

## 2018-12-06 ENCOUNTER — READMISSION MANAGEMENT (OUTPATIENT)
Dept: CALL CENTER | Facility: HOSPITAL | Age: 83
End: 2018-12-06

## 2018-12-06 NOTE — OUTREACH NOTE
General Surgery Week 2 Survey      Responses   Facility patient discharged from?  [facility]   Does the patient have one of the following disease processes/diagnoses(primary or secondary)?  General Surgery   Week 2 attempt successful?  Yes   Call start time  1040   Call end time  1042   Discharge diagnosis  Closed displaced intertrochanteric fracture of left femur, atherosclerosis, emphysema of lung, HTN, s/p Femur intramedullary nailing/rodding   Is patient permission given to speak with other caregiver?  Yes   List who call center can speak with  wife   Meds reviewed with patient/caregiver?  Yes   Is the patient having any side effects they believe may be caused by any medication additions or changes?  No   Does the patient have all medications related to this admission filled (includes all antibiotics, pain medications, etc.)  Yes   Is the patient taking all medications as directed (includes completed medication regime)?  Yes   Does the patient have a follow up appointment scheduled with their surgeon?  Yes   Has the patient kept scheduled appointments due by today?  N/A   What is the Home health agency?   [facility]   Has home health visited the patient within 72 hours of discharge?  Yes   Psychosocial issues?  No   Comments  Pain is being well controlled by pain med per wife. Denies any needs.   Did the patient receive a copy of their discharge instructions?  Yes   Nursing interventions  Reviewed instructions with patient   What is the patient's perception of their health status since discharge?  Improving   Nursing interventions  Nurse provided patient education   Is the patient /caregiver able to teach back basic post-op care?  Continue use of incentive spirometry at least 1 week post discharge, Drive as instructed by MD in discharge instructions, Lifting as instructed by MD in discharge instructions, No tub bath, swimming, or hot tub until instructed by MD   Is the patient/caregiver able to teach back signs and  symptoms of incisional infection?  Increased redness, swelling or pain at the incisonal site, Incisional warmth, Fever   Is the patient/caregiver able to teach back steps to recovery at home?  Set small, achievable goals for return to baseline health, Eat a well-balance diet, Practice good oral hygiene   Is the patient/caregiver able to teach back the hierarchy of who to call/visit for symptoms/problems? PCP, Specialist, Home health nurse, Urgent Care, ED, 911  Yes   Week 2 call completed?  Yes          Hattie Donohue, RN

## 2018-12-06 NOTE — PROGRESS NOTES
Given the findings of chest xray and CTA aorta 11/20/2018, a follow up appointment has been scheduled 12/12/2018 at 0915 with Dr. Jackson for follow up of lung lesion and kidney lesion.

## 2018-12-08 NOTE — PROGRESS NOTES
A CT of the Chest has been scheduled for 12/10/2018 at 4:00 pm for correlation of right suprahilar mass lesion noted on chest xray from 11/20/2018.  I called the patient's wife (Maria M) and gave her instructions for the patient to go to [facility] and NPO for 4 hours.  The patient's wife verbalizes understanding and states she will bring the patient.

## 2018-12-10 ENCOUNTER — HOSPITAL ENCOUNTER (OUTPATIENT)
Dept: CT IMAGING | Facility: HOSPITAL | Age: 83
Discharge: HOME | End: 2018-12-10
Admitting: NURSE PRACTITIONER

## 2018-12-10 DIAGNOSIS — F17.200 TOBACCO DEPENDENCE SYNDROME: ICD-10-CM

## 2018-12-10 DIAGNOSIS — Z78.9 IMPAIRED MOBILITY AND ACTIVITIES OF DAILY LIVING: ICD-10-CM

## 2018-12-10 DIAGNOSIS — R91.8 MASS OF RIGHT LUNG: ICD-10-CM

## 2018-12-10 DIAGNOSIS — H47.20 OPTIC ATROPHY: ICD-10-CM

## 2018-12-10 DIAGNOSIS — Z09 FOLLOW-UP SURGERY CARE: ICD-10-CM

## 2018-12-10 DIAGNOSIS — S72.22XA CLOSED DISPLACED SUBTROCHANTERIC FRACTURE OF LEFT FEMUR, INITIAL ENCOUNTER (HCC): ICD-10-CM

## 2018-12-10 DIAGNOSIS — S72.142D CLOSED DISPLACED INTERTROCHANTERIC FRACTURE OF LEFT FEMUR WITH ROUTINE HEALING, SUBSEQUENT ENCOUNTER: ICD-10-CM

## 2018-12-10 DIAGNOSIS — I27.20 PULMONARY HYPERTENSION (HCC): Chronic | ICD-10-CM

## 2018-12-10 DIAGNOSIS — E78.2 MIXED HYPERLIPIDEMIA: Chronic | ICD-10-CM

## 2018-12-10 DIAGNOSIS — F17.218 NICOTINE DEPENDENCE, CIGARETTES, WITH OTHER NICOTINE-INDUCED DISORDERS: ICD-10-CM

## 2018-12-10 DIAGNOSIS — I65.23 CAROTID STENOSIS, BILATERAL: ICD-10-CM

## 2018-12-10 DIAGNOSIS — Z86.010 HISTORY OF ADENOMATOUS POLYP OF COLON: ICD-10-CM

## 2018-12-10 DIAGNOSIS — S72.142A CLOSED DISPLACED INTERTROCHANTERIC FRACTURE OF LEFT FEMUR, INITIAL ENCOUNTER (HCC): ICD-10-CM

## 2018-12-10 DIAGNOSIS — I70.90 ATHEROSCLEROSIS: Chronic | ICD-10-CM

## 2018-12-10 DIAGNOSIS — R55 SYNCOPE, UNSPECIFIED SYNCOPE TYPE: ICD-10-CM

## 2018-12-10 DIAGNOSIS — Z74.09 IMPAIRED FUNCTIONAL MOBILITY AND ACTIVITY TOLERANCE: ICD-10-CM

## 2018-12-10 DIAGNOSIS — Z74.09 IMPAIRED MOBILITY AND ACTIVITIES OF DAILY LIVING: ICD-10-CM

## 2018-12-10 PROCEDURE — 71260 CT THORAX DX C+: CPT

## 2018-12-10 PROCEDURE — 25010000002 IOPAMIDOL 61 % SOLUTION: Performed by: NURSE PRACTITIONER

## 2018-12-10 RX ADMIN — IOPAMIDOL 90 ML: 612 INJECTION, SOLUTION INTRAVENOUS at 16:03

## 2018-12-12 ENCOUNTER — OFFICE VISIT (OUTPATIENT)
Dept: FAMILY MEDICINE CLINIC | Facility: CLINIC | Age: 83
End: 2018-12-12

## 2018-12-12 ENCOUNTER — HOSPITAL ENCOUNTER (OUTPATIENT)
Dept: ULTRASOUND IMAGING | Facility: HOSPITAL | Age: 83
Discharge: HOME | End: 2018-12-12
Admitting: GENERAL PRACTICE

## 2018-12-12 VITALS
SYSTOLIC BLOOD PRESSURE: 138 MMHG | DIASTOLIC BLOOD PRESSURE: 70 MMHG | WEIGHT: 154.8 LBS | HEART RATE: 71 BPM | OXYGEN SATURATION: 99 % | BODY MASS INDEX: 22.93 KG/M2 | HEIGHT: 69 IN

## 2018-12-12 DIAGNOSIS — M79.89 LEFT LEG SWELLING: ICD-10-CM

## 2018-12-12 DIAGNOSIS — S72.142D CLOSED DISPLACED INTERTROCHANTERIC FRACTURE OF LEFT FEMUR WITH ROUTINE HEALING, SUBSEQUENT ENCOUNTER: Primary | ICD-10-CM

## 2018-12-12 DIAGNOSIS — N28.1 CYST OF RIGHT KIDNEY: ICD-10-CM

## 2018-12-12 PROCEDURE — 93971 EXTREMITY STUDY: CPT

## 2018-12-12 PROCEDURE — 99214 OFFICE O/P EST MOD 30 MIN: CPT | Performed by: GENERAL PRACTICE

## 2018-12-12 NOTE — PROGRESS NOTES
Subjective   Maciej Funk is a 83 y.o. male.   Chief Complaint   Patient presents with   • Follow-up   • Hypertension     History of Present Illness     Recent hospitalization, labs, xrays reviewed and medications reconciled. Fell and broke his hip left hip on 11/20/18 had surgery and was in hospital for 5 days, now home with home health.  A CTA of the aorta was done and this did reveal lesion on the right kidney which recommended follow-up.  He has had this done and this is a simple cyst.  He is having some swelling of the left lower extremity.  Otherwise seems to be doing well he is getting around with his walker and improving daily.  Denies any significant pain.    The following portions of the patient's history were reviewed and updated as appropriate: allergies, current medications, past social history and problem list.    Outpatient Medications Prior to Visit   Medication Sig Dispense Refill   • aspirin 81 MG chewable tablet Chew 81 mg Daily.     • carvedilol (COREG) 6.25 MG tablet Take 1 tablet by mouth 2 (Two) Times a Day With Meals. 60 tablet 3   • HYDROcodone-acetaminophen (NORCO) 7.5-325 MG per tablet Take 1 tablet by mouth Every 6 (Six) Hours As Needed for Moderate Pain . 80 tablet 0   • latanoprost (XALATAN) 0.005 % ophthalmic solution Administer 1 drop into the left eye Every Night.     • lisinopril (PRINIVIL,ZESTRIL) 20 MG tablet Take 1 tablet by mouth Daily. 90 tablet 3   • Multiple Vitamins-Minerals (VISION-HECTOR PRESERVE PO) Take 1 capsule by mouth 2 (Two) Times a Day. PreserVision Lutein 226 mg-200 unit-5 mg-0.8 mg capsule      • simvastatin (ZOCOR) 20 MG tablet Take 1 tablet by mouth Every Night. Bedtime 90 tablet 3     No facility-administered medications prior to visit.        Review of Systems   Constitutional: Negative.  Negative for chills, fatigue, fever and unexpected weight change.   HENT: Negative.  Negative for congestion, ear pain, hearing loss, nosebleeds, rhinorrhea, sneezing,  "sore throat and tinnitus.    Eyes: Negative.  Negative for discharge.   Respiratory: Negative.  Negative for cough, shortness of breath and wheezing.    Cardiovascular: Negative.  Negative for chest pain and palpitations.   Gastrointestinal: Negative.  Negative for abdominal pain, constipation, diarrhea, nausea and vomiting.   Endocrine: Negative.    Genitourinary: Negative.  Negative for dysuria, frequency and urgency.   Musculoskeletal: Negative.  Negative for arthralgias, back pain, joint swelling, myalgias and neck pain.   Skin: Negative.  Negative for rash.   Allergic/Immunologic: Negative.    Neurological: Negative.  Negative for dizziness, weakness, numbness and headaches.   Hematological: Negative.  Negative for adenopathy.   Psychiatric/Behavioral: Negative.  Negative for dysphoric mood and sleep disturbance. The patient is not nervous/anxious.        Objective   Visit Vitals  /70 (BP Location: Left arm, Patient Position: Sitting, Cuff Size: Adult)   Pulse 71   Ht 175.3 cm (69\")   Wt 70.2 kg (154 lb 12.8 oz)   SpO2 99%   BMI 22.86 kg/m²     Physical Exam   Constitutional: He is oriented to person, place, and time. He appears well-developed and well-nourished. No distress.   HENT:   Head: Normocephalic.   Nose: Nose normal.   Mouth/Throat: Oropharynx is clear and moist.   Eyes: Conjunctivae and EOM are normal. Pupils are equal, round, and reactive to light. Right eye exhibits no discharge. Left eye exhibits no discharge.   Neck: No thyromegaly present.   Cardiovascular: Normal rate, regular rhythm, normal heart sounds and intact distal pulses.   No murmur heard.  1-2 plus edema left lower leg - need to rule out DVT   Pulmonary/Chest: Effort normal and breath sounds normal.   Musculoskeletal: He exhibits no edema.   Lymphadenopathy:     He has no cervical adenopathy.   Neurological: He is alert and oriented to person, place, and time.   Skin: Skin is warm and dry.   Psychiatric: He has a normal mood and " affect.   Nursing note and vitals reviewed.      Assessment/Plan   Problem List Items Addressed This Visit        Musculoskeletal and Integument    Closed displaced intertrochanteric fracture of left femur (CMS/HCC) - Primary      Other Visit Diagnoses     Left leg swelling        Relevant Orders    US venous doppler lower extremity left (duplex) (Completed)    Cyst of right kidney              IMPRESSION:  CONCLUSION:  1.  Left calf peroneal vein deep venous thrombosis which is also  in the patient's region of left calf pain.  2.  Otherwise unremarkable left lower extremity venous  ultrasound.  3.  Dr. Jackson's staff was notified of findings by phone at  11:05 AM on 2018.    Started on Eliquis 10 mg twice a day for 7 days then 5 mg twice a day as he is at increased risk for progression due to his recent surgery and decreased mobility.  Should be able to  discontinue this after 3 months.  New Medications Ordered This Visit   Medications   • apixaban (ELIQUIS) 5 MG tablet tablet     Si tabs twice a day for 7 days then 1 tab twice a day     Dispense:  60 tablet     Refill:  0     Return for Next scheduled follow up.

## 2018-12-13 ENCOUNTER — READMISSION MANAGEMENT (OUTPATIENT)
Dept: CALL CENTER | Facility: HOSPITAL | Age: 83
End: 2018-12-13

## 2018-12-14 NOTE — OUTREACH NOTE
General Surgery Week 3 Survey      Responses   Facility patient discharged from?  [facility]   Does the patient have one of the following disease processes/diagnoses(primary or secondary)?  General Surgery   Week 3 attempt successful?  Yes   Call start time  1817   Call end time  1819   Discharge diagnosis  Closed displaced intertrochanteric fracture of left femur, atherosclerosis, emphysema of lung, HTN, s/p Femur intramedullary nailing/rodding   List who call center can speak with  wife   Person spoke with today (if not patient) and relationship  wife   Meds reviewed with patient/caregiver?  Yes   Is the patient having any side effects they believe may be caused by any medication additions or changes?  No   Does the patient have all medications related to this admission filled (includes all antibiotics, pain medications, etc.)  Yes   Is the patient taking all medications as directed (includes completed medication regime)?  Yes   Medication comments  blood thinner for blood clot   Does the patient have a follow up appointment scheduled with their surgeon?  Yes   Has the patient kept scheduled appointments due by today?  Yes   Comments  saw Dr. Jackson Wednesday   What is the Home health agency?   [facility]   Has home health visited the patient within 72 hours of discharge?  Yes   What DME was ordered?  Pt has cane and walker at home   DME comments  pain is    Psychosocial issues?  No   Comments  pain is almost gone   Did the patient receive a copy of their discharge instructions?  Yes   Nursing interventions  Reviewed instructions with patient   What is the patient's perception of their health status since discharge?  Improving   Nursing interventions  Nurse provided patient education   Is the patient/caregiver able to teach back steps to recovery at home?  Set small, achievable goals for return to baseline health, Eat a well-balance diet, Practice good oral hygiene, Rest and rebuild strength, gradually increase  activity   Is the patient/caregiver able to teach back the hierarchy of who to call/visit for symptoms/problems? PCP, Specialist, Home health nurse, Urgent Care, ED, 911  Yes   Week 3 call completed?  Yes          Racquel May RN

## 2018-12-20 ENCOUNTER — READMISSION MANAGEMENT (OUTPATIENT)
Dept: CALL CENTER | Facility: HOSPITAL | Age: 83
End: 2018-12-20

## 2018-12-20 NOTE — OUTREACH NOTE
General Surgery Week 4 Survey      Responses   Facility patient discharged from?  [facility]   Does the patient have one of the following disease processes/diagnoses(primary or secondary)?  General Surgery   Week 4 attempt successful?  Yes   Call start time  1449   Rescheduled  Revoked   Revoke  Decline to participate   Call end time  1449   General alerts for this patient  Caller hung up   Discharge diagnosis  Closed displaced intertrochanteric fracture of left femur, atherosclerosis, emphysema of lung, HTN, s/p Femur intramedullary nailing/rodding          Adriana Crawford RN

## 2019-01-29 DIAGNOSIS — I65.23 BILATERAL CAROTID ARTERY STENOSIS: Primary | ICD-10-CM

## 2019-01-30 ENCOUNTER — OFFICE VISIT (OUTPATIENT)
Dept: CARDIAC SURGERY | Facility: CLINIC | Age: 84
End: 2019-01-30

## 2019-01-30 VITALS
WEIGHT: 157 LBS | BODY MASS INDEX: 24.64 KG/M2 | HEIGHT: 67 IN | DIASTOLIC BLOOD PRESSURE: 85 MMHG | HEART RATE: 75 BPM | OXYGEN SATURATION: 98 % | TEMPERATURE: 98.6 F | SYSTOLIC BLOOD PRESSURE: 140 MMHG

## 2019-01-30 DIAGNOSIS — E78.2 MIXED HYPERLIPIDEMIA: Chronic | ICD-10-CM

## 2019-01-30 DIAGNOSIS — I10 ESSENTIAL HYPERTENSION: Chronic | ICD-10-CM

## 2019-01-30 DIAGNOSIS — F17.218 NICOTINE DEPENDENCE, CIGARETTES, WITH OTHER NICOTINE-INDUCED DISORDERS: ICD-10-CM

## 2019-01-30 DIAGNOSIS — I65.23 CAROTID STENOSIS, BILATERAL: Primary | ICD-10-CM

## 2019-01-30 PROCEDURE — 99214 OFFICE O/P EST MOD 30 MIN: CPT | Performed by: NURSE PRACTITIONER

## 2019-01-30 PROCEDURE — 99406 BEHAV CHNG SMOKING 3-10 MIN: CPT | Performed by: NURSE PRACTITIONER

## 2019-02-01 DIAGNOSIS — S72.142D CLOSED DISPLACED INTERTROCHANTERIC FRACTURE OF LEFT FEMUR WITH ROUTINE HEALING, SUBSEQUENT ENCOUNTER: Primary | ICD-10-CM

## 2019-02-04 ENCOUNTER — OFFICE VISIT (OUTPATIENT)
Dept: ORTHOPEDIC SURGERY | Facility: CLINIC | Age: 84
End: 2019-02-04

## 2019-02-04 VITALS — BODY MASS INDEX: 24.96 KG/M2 | WEIGHT: 159 LBS | HEIGHT: 67 IN

## 2019-02-04 DIAGNOSIS — S72.142D CLOSED DISPLACED INTERTROCHANTERIC FRACTURE OF LEFT FEMUR WITH ROUTINE HEALING, SUBSEQUENT ENCOUNTER: Primary | ICD-10-CM

## 2019-02-04 PROCEDURE — 99024 POSTOP FOLLOW-UP VISIT: CPT | Performed by: ORTHOPAEDIC SURGERY

## 2019-02-04 NOTE — PROGRESS NOTES
"Postop Follow-up    Name:  Maciej Funk  Date:  2019  :  1935    Chief Complaint:    Chief Complaint   Patient presents with   • Left Hip - Follow-up     Date of surgery:         18 (75d) Wu Hare MD    FEMUR INTRAMEDULLARY NAILING/RODDING - Left       Procedure:    History of Present Illness: follow up with x-rays done today in office  Pain scale today 0/10    Pain controlled, no complaints of pain today.  Overall doing well.      Current Outpatient Medications:   •  apixaban (ELIQUIS) 5 MG tablet tablet, 2 tabs twice a day for 7 days then 1 tab twice a day, Disp: 60 tablet, Rfl: 0  •  aspirin 81 MG chewable tablet, Chew 81 mg Daily., Disp: , Rfl:   •  carvedilol (COREG) 6.25 MG tablet, Take 1 tablet by mouth 2 (Two) Times a Day With Meals., Disp: 60 tablet, Rfl: 3  •  latanoprost (XALATAN) 0.005 % ophthalmic solution, Administer 1 drop into the left eye Every Night., Disp: , Rfl:   •  lisinopril (PRINIVIL,ZESTRIL) 20 MG tablet, Take 1 tablet by mouth Daily., Disp: 90 tablet, Rfl: 3  •  Multiple Vitamins-Minerals (VISION-HECTOR PRESERVE PO), Take 1 capsule by mouth 2 (Two) Times a Day. PreserVision Lutein 226 mg-200 unit-5 mg-0.8 mg capsule , Disp: , Rfl:   •  simvastatin (ZOCOR) 20 MG tablet, Take 1 tablet by mouth Every Night. Bedtime, Disp: 90 tablet, Rfl: 3    No Known Allergies      Exam:  Vitals:    19 1419   Weight: 72.1 kg (159 lb)   Height: 170.2 cm (67\")       The patient is awake, alert, and oriented and in no apparent distress.    Right upper extremity:    Left upper extremity:    Right lower extremity:    Left lower extremity:  Ambulating with a cane slight limp  Good balance.  Neurologic exam intact.    Xr Hip With Or Without Pelvis 2 - 3 View Left    Result Date: 2019  Narrative: Ordering Provider:  Wu Hare MD Ordering Diagnosis/Indication:  Closed displaced intertrochanteric fracture of left femur with routine healing, subsequent " encounter Procedure:  XR HIP W OR WO PELVIS 2-3 VIEW LEFT Exam Date:  2/4/19 RELEVANT PRIOR IMAGES:  XR Hip With or Without Pelvis 2 - 3 View Left 12/03/2018 [id number] Final COMPARISON:  Todays X-rays were compared to previous images dated 12/3/2018.     Impression:  femur fracture reduced, healing present, fracture appliance in good position, fracture healing is present Bone quality: reduced Alignment: normal femur alignment Fracture: proximal femur transtrochanteric femur fracture Soft tissue: normal soft tissues.         Assessment:  Diagnoses and all orders for this visit:    Closed displaced intertrochanteric fracture of left femur with routine healing, subsequent encounter          Plan:  Weight bearing as tolerated left leg, use of cane/ assist device.  Xray left hip    No Follow-up on file.      02/04/19 at 2:20 PM by Wu Hare MD

## 2019-02-06 ENCOUNTER — TELEPHONE (OUTPATIENT)
Dept: FAMILY MEDICINE CLINIC | Facility: CLINIC | Age: 84
End: 2019-02-06

## 2019-02-07 LAB
BH CV XLRA MEAS CAROTID RIGHT ICA/CCA DIASTOLIC RATIO: 1.9
BH CV XLRA MEAS LEFT DIST CCA EDV: 11.7 CM/SEC
BH CV XLRA MEAS LEFT DIST CCA PSV: 57.9 CM/SEC
BH CV XLRA MEAS LEFT DIST ICA EDV: -23.8 CM/SEC
BH CV XLRA MEAS LEFT DIST ICA PSV: -77.8 CM/SEC
BH CV XLRA MEAS LEFT ICA/CCA DIASTOLIC RATIO: 2.5
BH CV XLRA MEAS LEFT ICA/CCA RATIO: 1.4
BH CV XLRA MEAS LEFT MID ICA EDV: -29.3 CM/SEC
BH CV XLRA MEAS LEFT MID ICA PSV: -81.1 CM/SEC
BH CV XLRA MEAS LEFT PROX CCA EDV: 18.8 CM/SEC
BH CV XLRA MEAS LEFT PROX CCA PSV: 112.5 CM/SEC
BH CV XLRA MEAS LEFT PROX ECA EDV: 0 CM/SEC
BH CV XLRA MEAS LEFT PROX ECA PSV: -113 CM/SEC
BH CV XLRA MEAS LEFT PROX ICA EDV: -20.2 CM/SEC
BH CV XLRA MEAS LEFT PROX ICA PSV: -64.3 CM/SEC
BH CV XLRA MEAS LEFT VERTEBRAL A EDV: 39.8 CM/SEC
BH CV XLRA MEAS LEFT VERTEBRAL A PSV: 140 CM/SEC
BH CV XLRA MEAS RIGHT DIST CCA EDV: 18.8 CM/SEC
BH CV XLRA MEAS RIGHT DIST CCA PSV: 77 CM/SEC
BH CV XLRA MEAS RIGHT DIST ICA EDV: -34.9 CM/SEC
BH CV XLRA MEAS RIGHT DIST ICA PSV: -102.8 CM/SEC
BH CV XLRA MEAS RIGHT ICA/CCA RATIO: 1.3
BH CV XLRA MEAS RIGHT MID ICA EDV: 30.1 CM/SEC
BH CV XLRA MEAS RIGHT MID ICA PSV: 96.4 CM/SEC
BH CV XLRA MEAS RIGHT PROX CCA EDV: -12.3 CM/SEC
BH CV XLRA MEAS RIGHT PROX CCA PSV: -154.6 CM/SEC
BH CV XLRA MEAS RIGHT PROX ECA EDV: 0 CM/SEC
BH CV XLRA MEAS RIGHT PROX ECA PSV: -138.4 CM/SEC
BH CV XLRA MEAS RIGHT PROX ICA EDV: 23.6 CM/SEC
BH CV XLRA MEAS RIGHT PROX ICA PSV: 86.7 CM/SEC
BH CV XLRA MEAS RIGHT VERTEBRAL A EDV: 9.7 CM/SEC
BH CV XLRA MEAS RIGHT VERTEBRAL A PSV: 23.9 CM/SEC

## 2019-02-11 ENCOUNTER — LAB (OUTPATIENT)
Dept: LAB | Facility: HOSPITAL | Age: 84
End: 2019-02-11

## 2019-02-11 ENCOUNTER — OFFICE VISIT (OUTPATIENT)
Dept: FAMILY MEDICINE CLINIC | Facility: CLINIC | Age: 84
End: 2019-02-11

## 2019-02-11 VITALS
HEART RATE: 91 BPM | BODY MASS INDEX: 25.05 KG/M2 | OXYGEN SATURATION: 91 % | HEIGHT: 67 IN | WEIGHT: 159.6 LBS | SYSTOLIC BLOOD PRESSURE: 128 MMHG | DIASTOLIC BLOOD PRESSURE: 68 MMHG

## 2019-02-11 DIAGNOSIS — Z00.00 MEDICARE ANNUAL WELLNESS VISIT, SUBSEQUENT: Primary | ICD-10-CM

## 2019-02-11 DIAGNOSIS — E78.2 MIXED HYPERLIPIDEMIA: Chronic | ICD-10-CM

## 2019-02-11 DIAGNOSIS — D64.9 ANEMIA, UNSPECIFIED TYPE: ICD-10-CM

## 2019-02-11 DIAGNOSIS — I82.4Z2 ACUTE DEEP VEIN THROMBOSIS (DVT) OF DISTAL VEIN OF LEFT LOWER EXTREMITY (HCC): ICD-10-CM

## 2019-02-11 DIAGNOSIS — J43.1 PANLOBULAR EMPHYSEMA (HCC): Chronic | ICD-10-CM

## 2019-02-11 DIAGNOSIS — I10 ESSENTIAL HYPERTENSION: Chronic | ICD-10-CM

## 2019-02-11 PROBLEM — I82.432 ACUTE DEEP VEIN THROMBOSIS (DVT) OF POPLITEAL VEIN OF LEFT LOWER EXTREMITY (HCC): Status: ACTIVE | Noted: 2019-02-11

## 2019-02-11 LAB
ALBUMIN SERPL-MCNC: 3.9 G/DL (ref 3.4–4.8)
ALBUMIN/GLOB SERPL: 1.1 G/DL (ref 1.1–1.8)
ALP SERPL-CCNC: 68 U/L (ref 38–126)
ALT SERPL W P-5'-P-CCNC: 9 U/L (ref 21–72)
ANION GAP SERPL CALCULATED.3IONS-SCNC: 11 MMOL/L (ref 5–15)
ARTICHOKE IGE QN: 104 MG/DL (ref 1–129)
AST SERPL-CCNC: 56 U/L (ref 17–59)
BASOPHILS # BLD AUTO: 0.06 10*3/MM3 (ref 0–0.2)
BASOPHILS NFR BLD AUTO: 0.7 % (ref 0–1.5)
BILIRUB SERPL-MCNC: 0.9 MG/DL (ref 0.2–1.3)
BUN BLD-MCNC: 16 MG/DL (ref 7–21)
BUN/CREAT SERPL: 15.8 (ref 7–25)
CALCIUM SPEC-SCNC: 9.1 MG/DL (ref 8.4–10.2)
CHLORIDE SERPL-SCNC: 101 MMOL/L (ref 95–110)
CHOLEST SERPL-MCNC: 173 MG/DL (ref 0–199)
CO2 SERPL-SCNC: 27 MMOL/L (ref 22–31)
CREAT BLD-MCNC: 1.01 MG/DL (ref 0.7–1.3)
DEPRECATED RDW RBC AUTO: 40.8 FL (ref 37–54)
EOSINOPHIL # BLD AUTO: 0.24 10*3/MM3 (ref 0–0.4)
EOSINOPHIL NFR BLD AUTO: 2.7 % (ref 0.3–6.2)
ERYTHROCYTE [DISTWIDTH] IN BLOOD BY AUTOMATED COUNT: 12.4 % (ref 12.3–15.4)
GFR SERPL CREATININE-BSD FRML MDRD: 71 ML/MIN/1.73 (ref 42–98)
GLOBULIN UR ELPH-MCNC: 3.4 GM/DL (ref 2.3–3.5)
GLUCOSE BLD-MCNC: 87 MG/DL (ref 60–100)
HCT VFR BLD AUTO: 38.8 % (ref 37.5–51)
HDLC SERPL-MCNC: 41 MG/DL (ref 60–200)
HGB BLD-MCNC: 13.2 G/DL (ref 13–17.7)
IMM GRANULOCYTES # BLD AUTO: 0.02 10*3/MM3 (ref 0–0.05)
IMM GRANULOCYTES NFR BLD AUTO: 0.2 % (ref 0–0.5)
LDLC/HDLC SERPL: 2.71 {RATIO} (ref 0–3.55)
LYMPHOCYTES # BLD AUTO: 3.26 10*3/MM3 (ref 0.7–3.1)
LYMPHOCYTES NFR BLD AUTO: 37.3 % (ref 19.6–45.3)
MCH RBC QN AUTO: 30.5 PG (ref 26.6–33)
MCHC RBC AUTO-ENTMCNC: 34 G/DL (ref 31.5–35.7)
MCV RBC AUTO: 89.6 FL (ref 79–97)
MONOCYTES # BLD AUTO: 0.55 10*3/MM3 (ref 0.1–0.9)
MONOCYTES NFR BLD AUTO: 6.3 % (ref 5–12)
NEUTROPHILS # BLD AUTO: 4.62 10*3/MM3 (ref 1.4–7)
NEUTROPHILS NFR BLD AUTO: 52.8 % (ref 42.7–76)
NRBC BLD AUTO-RTO: 0 /100 WBC (ref 0–0)
PLATELET # BLD AUTO: 256 10*3/MM3 (ref 140–450)
PMV BLD AUTO: 8.9 FL (ref 6–12)
POTASSIUM BLD-SCNC: 4.1 MMOL/L (ref 3.5–5.1)
PROT SERPL-MCNC: 7.3 G/DL (ref 6.3–8.6)
RBC # BLD AUTO: 4.33 10*6/MM3 (ref 4.14–5.8)
SODIUM BLD-SCNC: 139 MMOL/L (ref 137–145)
TRIGL SERPL-MCNC: 105 MG/DL (ref 20–199)
WBC NRBC COR # BLD: 8.75 10*3/MM3 (ref 3.4–10.8)

## 2019-02-11 PROCEDURE — 36415 COLL VENOUS BLD VENIPUNCTURE: CPT

## 2019-02-11 PROCEDURE — 99214 OFFICE O/P EST MOD 30 MIN: CPT | Performed by: GENERAL PRACTICE

## 2019-02-11 PROCEDURE — 80053 COMPREHEN METABOLIC PANEL: CPT

## 2019-02-11 PROCEDURE — 85025 COMPLETE CBC W/AUTO DIFF WBC: CPT | Performed by: GENERAL PRACTICE

## 2019-02-11 PROCEDURE — 80061 LIPID PANEL: CPT

## 2019-02-11 PROCEDURE — G0439 PPPS, SUBSEQ VISIT: HCPCS | Performed by: GENERAL PRACTICE

## 2019-02-11 NOTE — PROGRESS NOTES
CVTS Office Progress Note       Subjective   Patient ID: Maciej Funk is a 83 y.o. male is here today for follow-up.    Chief Complaint:    Chief Complaint   Patient presents with   • Carotid Artery Disease     1 year follow up        The following portions of the patient's history were reviewed and updated as appropriate: allergies, current medications, past family history, past medical history, past social history, past surgical history and problem list.  Recent images independently reviewed.  Available laboratory values reviewed.    PCP:  Debbi Jackson MD    83 y.o. male with HTN, AAA, COPD, dyslipidemia, carotid stensosi.  smokes 1 PPD.  Bilateral carotid bruits noted on exam.  Unable to see out of LEFT eye x years.  Mild dizziness occasional x years.  Activity level good, mows yard, rakes leaves.  Plavix started for severe carotid stenosis on duplex imaging.  Recovering well from CEA.  No other associated symptoms or modifying factors.     11/9/2016 Carotid Duplex:  AISHA 50-79% (127cm/s), antegrade vert.  LICA 80-99% (522cm/s), antegrade vert.  12/9/2016 CTA Carotids:  AISHA 50%, LICA 95%  1/4/2017 LEFT CEA  2/27/2017 Carotid Duplex:  LICA normal (77cm/s), antegrade vert.  11/20/2017 Carotid Duplex:  AISHA 50-69% (99cm/s), antegrade vert.  LICA 0-49% (87cm/s), antegrade vert.  1/2019: Carotid Duplex: AISHA 0-49% (102/1.9) antegrade. LICA 0-49% (81/2.5) antegrade        Past Medical History:   Diagnosis Date   • Abdominal aortic aneurysm (CMS/HCC)    • Carotid stenosis, bilateral    • Cataract    • Emphysema of lung (CMS/HCC)    • Herpes zoster    • Hyperlipidemia    • Hypertension      Past Surgical History:   Procedure Laterality Date   • CARDIAC CATHETERIZATION     • CATARACT EXTRACTION W/ INTRAOCULAR LENS IMPLANT Left 2/2/2018   • CATARACT EXTRACTION W/ INTRAOCULAR LENS IMPLANT Right 2/9/2018   • COLONOSCOPY     • COLONOSCOPY W/ POLYPECTOMY     • FEMUR IM NAILING/RODDING Left 11/21/2018     Procedure: FEMUR INTRAMEDULLARY NAILING/RODDING;  Surgeon: Wu Hare MD;  Location: [facility];  Service: Orthopedics   • HAND SURGERY       Family History   Problem Relation Age of Onset   • Breast cancer Mother    • Cancer Mother    • Hypertension Father    • Kidney disease Father    • Kidney disease Brother      Social History     Tobacco Use   • Smoking status: Current Every Day Smoker     Packs/day: 1.00     Years: 60.00     Pack years: 60.00     Types: Cigarettes   • Smokeless tobacco: Former User   Substance Use Topics   • Alcohol use: No   • Drug use: No       ALLERGIES:   Patient has no known allergies.    MEDICATIONS:      Current Outpatient Medications:   •  aspirin 81 MG chewable tablet, Chew 81 mg Daily., Disp: , Rfl:   •  carvedilol (COREG) 6.25 MG tablet, Take 1 tablet by mouth 2 (Two) Times a Day With Meals., Disp: 60 tablet, Rfl: 3  •  latanoprost (XALATAN) 0.005 % ophthalmic solution, Administer 1 drop into the left eye Every Night., Disp: , Rfl:   •  lisinopril (PRINIVIL,ZESTRIL) 20 MG tablet, Take 1 tablet by mouth Daily., Disp: 90 tablet, Rfl: 3  •  Multiple Vitamins-Minerals (VISION-HECTOR PRESERVE PO), Take 1 capsule by mouth 2 (Two) Times a Day. PreserVision Lutein 226 mg-200 unit-5 mg-0.8 mg capsule , Disp: , Rfl:   •  simvastatin (ZOCOR) 20 MG tablet, Take 1 tablet by mouth Every Night. Bedtime, Disp: 90 tablet, Rfl: 3  •  apixaban (ELIQUIS) 5 MG tablet tablet, Take 1 tablet by mouth Every 12 (Twelve) Hours., Disp: 60 tablet, Rfl: 2    Review of Systems   Constitution: Negative for weakness.   HENT: Positive for hearing loss.    Eyes: Negative for visual disturbance.   Cardiovascular: Negative for claudication and cyanosis.   Respiratory: Negative for shortness of breath.    Skin: Negative for color change and nail changes.   Musculoskeletal: Negative for muscle weakness.   Gastrointestinal: Negative for dysphagia.   Neurological: Negative for focal weakness, light-headedness,  loss of balance, numbness and paresthesias.   Psychiatric/Behavioral: Negative for altered mental status.   All other systems reviewed and are negative.       Objective   Heart Rate:  [91] 91  BP: (128)/(68) 128/68  Body mass index is 24.59 kg/m².  Physical Exam   Constitutional: He is oriented to person, place, and time. He appears well-developed.   HENT:   Head: Normocephalic.   Eyes: EOM are normal.   Neck: Neck supple. Carotid bruit is not present.   Cardiovascular: Normal rate and normal heart sounds.   Pulses:       Dorsalis pedis pulses are 1+ on the right side, and 1+ on the left side.        Posterior tibial pulses are 1+ on the right side, and 1+ on the left side.   Pulmonary/Chest: Effort normal and breath sounds normal.   Abdominal: Soft. Bowel sounds are normal.   Musculoskeletal: Normal range of motion.   Gait normal   Neurological: He is alert and oriented to person, place, and time.   Skin: Skin is warm and dry. Capillary refill takes less than 2 seconds.   No venous staining   Psychiatric: He has a normal mood and affect. Thought content normal.   Vitals reviewed.        Assessment & Plan     Independent Review of Radiographic Studies:  Detailed discussion regarding risks, benefits, and treatment plan. Images independently reviewed. Patient understands, agrees, and wishes to proceed with plan.     1. Carotid stenosis, bilateral  mild Carotid Artery Stenosis bilateral remained stable Asymptomatic  Medical Management: ASA,STATIN   If you should experience any neurological symptoms including but not limited to visual or speech disturbances confusion, seizures, or weakness of limbs of one side of your body notify Heart and Vascular center immediately for evaluation or if after hours present to the nearest Emergency Department.    Return 1 year- Carotid duplex  - Duplex Carotid Ultrasound CAR; Future    2. Mixed hyperlipidemia  Lipid-lowering therapy has been proven beneficial in patients with vascular  disease. Current guidelines recommend statin treatment for all patients with PAD and carotid stenosis. Statins are beneficial in preventing cardiovascular events, increasing functional capacity and lower the risk of adverse limb loss in PAD. Statins decrease the progression of plaque formation and may improve peripheral vessel lining, and aid in reversing atherosclerosis.    3. Essential hypertension  Controlled     4. Nicotine dependence, cigarettes, with other nicotine-induced disorders  Smoking cessation assistance options offered including behavioral counseling (Smoking Cessation Classes), Nicotine replacement therapy (patches or gum), pharmacologic therapy (Chantix, Wellbutrin). Understands tobacco increases risk of expanding AAA, MI, CVA, PAD, carcinoma. Discussion and question answer period 5-7 minutes.   I advised Maciej of the risks of continuing to use tobacco, and I provided him with tobacco cessation educational materials in the After Visit Summary.     During this visit, I spent 5-7 minutes counseling the patient regarding tobacco cessation.              This document has been electronically signed by CHRISTINA Denny on February 11, 2019 1:01 PM

## 2019-02-11 NOTE — PROGRESS NOTES
Subjective   Maciej Funk is a 83 y.o. male.   Chief Complaint   Patient presents with   • Hypothyroidism   • Hypertension     medicare wellness     For review and evaluation of management of chronic medical problems. Labs pending. Had DVT following left hip fracture. On Eliquis.   Hypertension   This is a chronic problem. The current episode started more than 1 year ago. The problem is unchanged. The problem is controlled. Pertinent negatives include no chest pain, headaches, neck pain, palpitations or shortness of breath. There are no associated agents to hypertension. Risk factors for coronary artery disease include dyslipidemia and smoking/tobacco exposure. Current antihypertension treatment includes ACE inhibitors and beta blockers. The current treatment provides significant improvement. There are no compliance problems.    COPD   This is a chronic problem. The current episode started more than 1 year ago. The problem occurs constantly. The problem has been unchanged. Pertinent negatives include no abdominal pain, arthralgias, chest pain, chills, congestion, coughing, fatigue, fever, headaches, joint swelling, myalgias, nausea, neck pain, numbness, rash, sore throat, vomiting or weakness. The symptoms are aggravated by smoking. He has tried nothing for the symptoms. The treatment provided no relief.   Hyperlipidemia   This is a chronic problem. The current episode started more than 1 year ago. The problem is controlled. Recent lipid tests were reviewed and are normal. There are no known factors aggravating his hyperlipidemia. Pertinent negatives include no chest pain, myalgias or shortness of breath. Current antihyperlipidemic treatment includes statins. The current treatment provides significant improvement of lipids.      The following portions of the patient's history were reviewed and updated as appropriate: allergies, current medications, past social history and problem list.    Outpatient  Medications Prior to Visit   Medication Sig Dispense Refill   • apixaban (ELIQUIS) 5 MG tablet tablet 2 tabs twice a day for 7 days then 1 tab twice a day 60 tablet 0   • aspirin 81 MG chewable tablet Chew 81 mg Daily.     • carvedilol (COREG) 6.25 MG tablet Take 1 tablet by mouth 2 (Two) Times a Day With Meals. 60 tablet 3   • latanoprost (XALATAN) 0.005 % ophthalmic solution Administer 1 drop into the left eye Every Night.     • lisinopril (PRINIVIL,ZESTRIL) 20 MG tablet Take 1 tablet by mouth Daily. 90 tablet 3   • Multiple Vitamins-Minerals (VISION-HECTOR PRESERVE PO) Take 1 capsule by mouth 2 (Two) Times a Day. PreserVision Lutein 226 mg-200 unit-5 mg-0.8 mg capsule      • simvastatin (ZOCOR) 20 MG tablet Take 1 tablet by mouth Every Night. Bedtime 90 tablet 3     No facility-administered medications prior to visit.        Review of Systems   Constitutional: Negative.  Negative for chills, fatigue, fever and unexpected weight change.   HENT: Negative.  Negative for congestion, ear pain, hearing loss, nosebleeds, rhinorrhea, sneezing, sore throat and tinnitus.    Eyes: Negative.  Negative for discharge.   Respiratory: Negative.  Negative for cough, shortness of breath and wheezing.    Cardiovascular: Negative.  Negative for chest pain and palpitations.   Gastrointestinal: Negative.  Negative for abdominal pain, constipation, diarrhea, nausea and vomiting.   Endocrine: Negative.    Genitourinary: Negative.  Negative for dysuria, frequency and urgency.   Musculoskeletal: Negative.  Negative for arthralgias, back pain, joint swelling, myalgias and neck pain.   Skin: Negative.  Negative for rash.   Allergic/Immunologic: Negative.    Neurological: Negative.  Negative for dizziness, weakness, numbness and headaches.   Hematological: Negative.  Negative for adenopathy.   Psychiatric/Behavioral: Negative.  Negative for dysphoric mood and sleep disturbance. The patient is not nervous/anxious.      Objective   Visit  "Vitals  /68   Pulse 91   Ht 170.2 cm (67\")   Wt 72.4 kg (159 lb 9.6 oz)   SpO2 91%   BMI 25.00 kg/m²     Physical Exam   Constitutional: He is oriented to person, place, and time. He appears well-developed and well-nourished. No distress.   HENT:   Head: Normocephalic.   Nose: Nose normal.   Mouth/Throat: Oropharynx is clear and moist.   Eyes: Conjunctivae and EOM are normal. Pupils are equal, round, and reactive to light. Right eye exhibits no discharge. Left eye exhibits no discharge.   Neck: No thyromegaly present.   Cardiovascular: Normal rate, regular rhythm, normal heart sounds and intact distal pulses.   No murmur heard.  1 + edema lower legs   Pulmonary/Chest: Effort normal and breath sounds normal.   Musculoskeletal: He exhibits no edema.   Lymphadenopathy:     He has no cervical adenopathy.   Neurological: He is alert and oriented to person, place, and time.   Skin: Skin is warm and dry.   Psychiatric: He has a normal mood and affect.   Nursing note and vitals reviewed.    Assessment/Plan   Problem List Items Addressed This Visit        Cardiovascular and Mediastinum    Hypertension (Chronic)    Hyperlipidemia (Chronic)    Relevant Orders    Lipid Panel    Comprehensive Metabolic Panel    Acute deep vein thrombosis (DVT) of popliteal vein of left lower extremity (CMS/HCC)       Respiratory    Emphysema of lung (CMS/HCC) (Chronic)      Other Visit Diagnoses     Medicare annual wellness visit, subsequent    -  Primary    Anemia, unspecified type        Relevant Orders    CBC & Differential          Will notify regarding results. Continue current treatment. I advised the patient of the risks in continuing to use tobacco.  I also discussed how to quit smoking and the patient has not expressed the willingness to quit.    No orders of the defined types were placed in this encounter.    Return in about 3 months (around 5/11/2019).  "

## 2019-02-11 NOTE — PROGRESS NOTES
QUICK REFERENCE INFORMATION:  The ABCs of the Annual Wellness Visit    Subsequent Medicare Wellness Visit    HEALTH RISK ASSESSMENT    1935    Recent Hospitalizations:  Recently treated at the following:  [facility].        Current Medical Providers:  Patient Care Team:  Debbi Jackson MD as PCP - General (Family Medicine)        Smoking Status:  Social History     Tobacco Use   Smoking Status Current Every Day Smoker   • Packs/day: 1.00   • Years: 60.00   • Pack years: 60.00   • Types: Cigarettes   Smokeless Tobacco Former User       Alcohol Consumption:  Social History     Substance and Sexual Activity   Alcohol Use No       Depression Screen:   PHQ-2/PHQ-9 Depression Screening 2/11/2019   Little interest or pleasure in doing things 0   Feeling down, depressed, or hopeless 0   Trouble falling or staying asleep, or sleeping too much 0   Feeling tired or having little energy 0   Poor appetite or overeating 0   Feeling bad about yourself - or that you are a failure or have let yourself or your family down 0   Trouble concentrating on things, such as reading the newspaper or watching television 0   Moving or speaking so slowly that other people could have noticed. Or the opposite - being so fidgety or restless that you have been moving around a lot more than usual 0   Thoughts that you would be better off dead, or of hurting yourself in some way 0   Total Score 0       Health Habits and Functional and Cognitive Screening:  Functional & Cognitive Status 2/11/2019   Do you have difficulty preparing food and eating? No   Do you have difficulty bathing yourself, getting dressed or grooming yourself? No   Do you have difficulty using the toilet? No   Do you have difficulty moving around from place to place? No   Do you have trouble with steps or getting out of a bed or a chair? No   In the past year have you fallen or experienced a near fall? Yes   Current Diet Well Balanced Diet   Dental Exam  Unknown   Eye Exam Not up to date   Exercise (times per week) 0 times per week   Current Exercise Activities Include None   Do you need help using the phone?  Yes   Are you deaf or do you have serious difficulty hearing?  Yes   Do you need help with transportation? No   Do you need help shopping? No   Do you need help preparing meals?  No   Do you need help with housework?  No   Do you need help with laundry? No   Do you need help taking your medications? No   Do you need help managing money? No   Do you ever drive or ride in a car without wearing a seat belt? No   Have you felt unusual stress, anger or loneliness in the last month? No   Who do you live with? Spouse   If you need help, do you have trouble finding someone available to you? No   Have you been bothered in the last four weeks by sexual problems? No   Do you have difficulty concentrating, remembering or making decisions? No           Does the patient have evidence of cognitive impairment? No    Aspirin use counseling: Taking ASA appropriately as indicated      Recent Lab Results:  CMP:  Lab Results   Component Value Date    BUN 13 11/24/2018    CREATININE 0.84 11/24/2018    EGFRIFNONA 88 11/24/2018    BCR 15.5 11/24/2018     (L) 11/24/2018    K 4.3 11/24/2018    CO2 23.0 11/24/2018    CALCIUM 7.9 (L) 11/24/2018    ALBUMIN 2.60 (L) 11/24/2018    BILITOT 1.2 11/24/2018    ALKPHOS 56 11/24/2018    AST 21 11/24/2018    ALT 17 (L) 11/24/2018     Lipid Panel:  Lab Results   Component Value Date    CHOL 166 10/05/2017    TRIG 99 10/05/2017    HDL 40 (L) 10/05/2017    LDLHDL 2.66 10/05/2017     HbA1c:       Visual Acuity:  No exam data present    Age-appropriate Screening Schedule:  Refer to the list below for future screening recommendations based on patient's age, sex and/or medical conditions. Orders for these recommended tests are listed in the plan section. The patient has been provided with a written plan.    Health Maintenance   Topic Date Due   •  TDAP/TD VACCINES (1 - Tdap) 03/12/1954   • ZOSTER VACCINE (2 of 2) 11/30/2017   • LIPID PANEL  10/05/2018   • COLONOSCOPY  10/27/2020   • PNEUMOCOCCAL VACCINES (65+ LOW/MEDIUM RISK)  Completed   • INFLUENZA VACCINE  Addressed        Subjective   History of Present Illness    Maciej Funk is a 83 y.o. male who presents for an Subsequent Wellness Visit.    The following portions of the patient's history were reviewed and updated as appropriate: allergies, current medications, past family history, past medical history, past social history, past surgical history and problem list.    Outpatient Medications Prior to Visit   Medication Sig Dispense Refill   • apixaban (ELIQUIS) 5 MG tablet tablet 2 tabs twice a day for 7 days then 1 tab twice a day 60 tablet 0   • aspirin 81 MG chewable tablet Chew 81 mg Daily.     • carvedilol (COREG) 6.25 MG tablet Take 1 tablet by mouth 2 (Two) Times a Day With Meals. 60 tablet 3   • latanoprost (XALATAN) 0.005 % ophthalmic solution Administer 1 drop into the left eye Every Night.     • lisinopril (PRINIVIL,ZESTRIL) 20 MG tablet Take 1 tablet by mouth Daily. 90 tablet 3   • Multiple Vitamins-Minerals (VISION-HECTOR PRESERVE PO) Take 1 capsule by mouth 2 (Two) Times a Day. PreserVision Lutein 226 mg-200 unit-5 mg-0.8 mg capsule      • simvastatin (ZOCOR) 20 MG tablet Take 1 tablet by mouth Every Night. Bedtime 90 tablet 3     No facility-administered medications prior to visit.        Patient Active Problem List   Diagnosis   • Hypertension   • History of adenomatous polyp of colon   • Hyperlipidemia   • Tobacco dependence syndrome   • Nicotine dependence, cigarettes, with other nicotine-induced disorders   • Carotid stenosis, bilateral   • Optic atrophy   • Nonexudative age-related macular degeneration   • Nuclear cataract   • Follow-up surgery care   • Closed displaced intertrochanteric fracture of left femur (CMS/HCC)   • Pulmonary hypertension (CMS/HCC)   • Emphysema of lung  "(CMS/[facility])   • Atherosclerosis   • Acute deep vein thrombosis (DVT) of popliteal vein of left lower extremity (CMS/[facility])       Advance Care Planning:  power of  for healthcare on file, has an advance directive - a copy has been provided and is in file    Identification of Risk Factors:  Risk factors include: weight , unhealthy diet, inactivity, tobacco use and increased fall risk.    Review of Systems    Compared to one year ago, the patient feels his physical health is the same.  Compared to one year ago, the patient feels his mental health is the same.    Objective     Physical Exam   Constitutional: He is oriented to person, place, and time. He appears well-developed and well-nourished. No distress.   HENT:   Head: Normocephalic.   Nose: Nose normal.   Mouth/Throat: Oropharynx is clear and moist.   Eyes: Conjunctivae and EOM are normal. Pupils are equal, round, and reactive to light. Right eye exhibits no discharge. Left eye exhibits no discharge.   Neck: No thyromegaly present.   Cardiovascular: Normal rate, regular rhythm, normal heart sounds and intact distal pulses.   No murmur heard.  Pulmonary/Chest: Effort normal and breath sounds normal.   Musculoskeletal: He exhibits no edema.   Lymphadenopathy:     He has no cervical adenopathy.   Neurological: He is alert and oriented to person, place, and time.   Skin: Skin is warm and dry.   Psychiatric: He has a normal mood and affect.   Nursing note and vitals reviewed.      Vitals:    02/11/19 1122   BP: 128/68   Pulse: 91   SpO2: 91%   Weight: 72.4 kg (159 lb 9.6 oz)   Height: 170.2 cm (67\")   PainSc: 0-No pain       Patient's Body mass index is 25 kg/m². BMI is within normal parameters. No follow-up required..      Assessment/Plan   Patient Self-Management and Personalized Health Advice  The patient has been provided with information about: diet, exercise, weight management, tobacco cessation and fall prevention and preventive services including: "   · Advance directive, Exercise counseling provided, Fall Risk assessment done, Fall Risk plan of care done, Influenza vaccine, Pneumococcal vaccine , Smoking cessation counseling completed, Zostavax vaccine (Herpes Zoster).    Visit Diagnoses:    ICD-10-CM ICD-9-CM   1. Medicare annual wellness visit, subsequent Z00.00 V70.0   2. Anemia, unspecified type D64.9 285.9   3. Essential hypertension I10 401.9   4. Mixed hyperlipidemia E78.2 272.2   5. Acute deep vein thrombosis (DVT) of distal vein of left lower extremity (CMS/[facility]) I82.4Z2 453.42   6. Panlobular emphysema (CMS/[facility]) J43.1 492.8       Orders Placed This Encounter   Procedures   • Lipid Panel     Standing Status:   Future     Standing Expiration Date:   2/11/2020   • Comprehensive Metabolic Panel     Standing Status:   Future     Standing Expiration Date:   2/11/2020   • CBC & Differential     Order Specific Question:   Manual Differential     Answer:   No       Outpatient Encounter Medications as of 2/11/2019   Medication Sig Dispense Refill   • apixaban (ELIQUIS) 5 MG tablet tablet 2 tabs twice a day for 7 days then 1 tab twice a day 60 tablet 0   • aspirin 81 MG chewable tablet Chew 81 mg Daily.     • carvedilol (COREG) 6.25 MG tablet Take 1 tablet by mouth 2 (Two) Times a Day With Meals. 60 tablet 3   • latanoprost (XALATAN) 0.005 % ophthalmic solution Administer 1 drop into the left eye Every Night.     • lisinopril (PRINIVIL,ZESTRIL) 20 MG tablet Take 1 tablet by mouth Daily. 90 tablet 3   • Multiple Vitamins-Minerals (VISION-HECTOR PRESERVE PO) Take 1 capsule by mouth 2 (Two) Times a Day. PreserVision Lutein 226 mg-200 unit-5 mg-0.8 mg capsule      • simvastatin (ZOCOR) 20 MG tablet Take 1 tablet by mouth Every Night. Bedtime 90 tablet 3     No facility-administered encounter medications on file as of 2/11/2019.        Reviewed use of high risk medication in the elderly: yes  Reviewed for potential of harmful drug interactions in the elderly:  yes    Follow Up:  Return in about 3 months (around 5/11/2019).     An After Visit Summary and PPPS with all of these plans were given to the patient.    Information has been scanned into chart.  Discussed importance of taking medications as prescribed. Encouraged healthy eating habits with low fat, low salt choices and working towards maintaining a healthy weight. Recommended regular exercise if able as well as care to prevent falls.

## 2019-03-22 RX ORDER — CARVEDILOL 6.25 MG/1
TABLET ORAL
Qty: 60 TABLET | Refills: 5 | Status: SHIPPED | OUTPATIENT
Start: 2019-03-22 | End: 2019-05-10 | Stop reason: SDUPTHER

## 2019-04-08 RX ORDER — LISINOPRIL 20 MG/1
TABLET ORAL
Qty: 90 TABLET | Refills: 3 | Status: SHIPPED | OUTPATIENT
Start: 2019-04-08 | End: 2020-04-06

## 2019-04-08 RX ORDER — SIMVASTATIN 20 MG
TABLET ORAL
Qty: 90 TABLET | Refills: 3 | Status: SHIPPED | OUTPATIENT
Start: 2019-04-08 | End: 2020-04-06

## 2019-04-12 RX ORDER — APIXABAN 5 MG/1
TABLET, FILM COATED ORAL
Qty: 60 TABLET | Refills: 2 | Status: SHIPPED | OUTPATIENT
Start: 2019-04-12 | End: 2019-07-10 | Stop reason: SDUPTHER

## 2019-05-10 ENCOUNTER — OFFICE VISIT (OUTPATIENT)
Dept: FAMILY MEDICINE CLINIC | Facility: CLINIC | Age: 84
End: 2019-05-10

## 2019-05-10 VITALS
DIASTOLIC BLOOD PRESSURE: 82 MMHG | SYSTOLIC BLOOD PRESSURE: 140 MMHG | WEIGHT: 161.4 LBS | HEART RATE: 74 BPM | HEIGHT: 67 IN | OXYGEN SATURATION: 99 % | BODY MASS INDEX: 25.33 KG/M2

## 2019-05-10 DIAGNOSIS — J43.1 PANLOBULAR EMPHYSEMA (HCC): Chronic | ICD-10-CM

## 2019-05-10 DIAGNOSIS — I10 ESSENTIAL HYPERTENSION: Primary | Chronic | ICD-10-CM

## 2019-05-10 PROCEDURE — 99213 OFFICE O/P EST LOW 20 MIN: CPT | Performed by: GENERAL PRACTICE

## 2019-05-10 RX ORDER — CARVEDILOL 6.25 MG/1
6.25 TABLET ORAL 2 TIMES DAILY WITH MEALS
Qty: 180 TABLET | Refills: 3 | Status: SHIPPED | OUTPATIENT
Start: 2019-05-10 | End: 2019-11-14

## 2019-05-10 NOTE — PROGRESS NOTES
Subjective   Maciej Funk is a 84 y.o. male.   Chief Complaint   Patient presents with   • Hypertension     For review and evaluation of management of chronic medical problems. Had peroneal DVT after hip fracture in Dec. Still on eliquis.  Hypertension   This is a chronic problem. The current episode started more than 1 year ago. The problem is unchanged. The problem is controlled. Pertinent negatives include no chest pain, headaches, neck pain, palpitations or shortness of breath. There are no associated agents to hypertension. Risk factors for coronary artery disease include dyslipidemia and smoking/tobacco exposure. Current antihypertension treatment includes ACE inhibitors and beta blockers. The current treatment provides significant improvement. There are no compliance problems.    COPD   This is a chronic problem. The current episode started more than 1 year ago. The problem occurs constantly. The problem has been unchanged. Pertinent negatives include no abdominal pain, arthralgias, chest pain, chills, congestion, coughing, fatigue, fever, headaches, joint swelling, myalgias, nausea, neck pain, numbness, rash, sore throat, vomiting or weakness. The symptoms are aggravated by smoking. He has tried nothing for the symptoms. The treatment provided no relief.      The following portions of the patient's history were reviewed and updated as appropriate: allergies, current medications, past social history and problem list.    Outpatient Medications Prior to Visit   Medication Sig Dispense Refill   • aspirin 81 MG chewable tablet Chew 81 mg Daily.     • ELIQUIS 5 MG tablet tablet TAKE 1 TABLET EVERY 12 HOURS 60 tablet 2   • latanoprost (XALATAN) 0.005 % ophthalmic solution Administer 1 drop into the left eye Every Night.     • lisinopril (PRINIVIL,ZESTRIL) 20 MG tablet TAKE 1 TABLET BY MOUTH EVERY DAY 90 tablet 3   • Multiple Vitamins-Minerals (VISION-HECTOR PRESERVE PO) Take 1 capsule by mouth 2 (Two) Times a  "Day. PreserVision Lutein 226 mg-200 unit-5 mg-0.8 mg capsule      • simvastatin (ZOCOR) 20 MG tablet TAKE 1 TABLET BY MOUTH AT BEDTIME 90 tablet 3   • carvedilol (COREG) 6.25 MG tablet TAKE 1 TABLET BY MOUTH TWICE DAILY WITH MEALS 60 tablet 5     No facility-administered medications prior to visit.        Review of Systems   Constitutional: Negative.  Negative for chills, fatigue, fever and unexpected weight change.   HENT: Negative.  Negative for congestion, ear pain, hearing loss, nosebleeds, rhinorrhea, sneezing, sore throat and tinnitus.    Eyes: Negative.  Negative for discharge.   Respiratory: Negative.  Negative for cough, shortness of breath and wheezing.    Cardiovascular: Negative.  Negative for chest pain and palpitations.   Gastrointestinal: Negative.  Negative for abdominal pain, constipation, diarrhea, nausea and vomiting.   Endocrine: Negative.    Genitourinary: Negative.  Negative for dysuria, frequency and urgency.   Musculoskeletal: Negative.  Negative for arthralgias, back pain, joint swelling, myalgias and neck pain.   Skin: Negative.  Negative for rash.   Allergic/Immunologic: Negative.    Neurological: Negative.  Negative for dizziness, weakness, numbness and headaches.   Hematological: Negative.  Negative for adenopathy.   Psychiatric/Behavioral: Negative.  Negative for dysphoric mood and sleep disturbance. The patient is not nervous/anxious.        Objective   Visit Vitals  /82 (BP Location: Left arm)   Pulse 74   Ht 170.2 cm (67\")   Wt 73.2 kg (161 lb 6.4 oz)   SpO2 99%   BMI 25.28 kg/m²     Physical Exam   Constitutional: He is oriented to person, place, and time. He appears well-developed and well-nourished. No distress.   HENT:   Head: Normocephalic.   Nose: Nose normal.   Mouth/Throat: Oropharynx is clear and moist.   Eyes: Conjunctivae and EOM are normal. Pupils are equal, round, and reactive to light. Right eye exhibits no discharge. Left eye exhibits no discharge.   Neck: No " thyromegaly present.   Cardiovascular: Normal rate, regular rhythm, normal heart sounds and intact distal pulses.   No murmur heard.  Minimal ankle swelling   Pulmonary/Chest: Effort normal and breath sounds normal.   Musculoskeletal: He exhibits no edema.   Lymphadenopathy:     He has no cervical adenopathy.   Neurological: He is alert and oriented to person, place, and time.   Skin: Skin is warm and dry.   Psychiatric: He has a normal mood and affect.   Nursing note and vitals reviewed.    Assessment/Plan   Problem List Items Addressed This Visit        Cardiovascular and Mediastinum    Hypertension - Primary (Chronic)    Relevant Medications    carvedilol (COREG) 6.25 MG tablet       Respiratory    Emphysema of lung (CMS/HCC) (Chronic)          Continue current treatment. Discontinue Eliquis after 6 months.     New Medications Ordered This Visit   Medications   • carvedilol (COREG) 6.25 MG tablet     Sig: Take 1 tablet by mouth 2 (Two) Times a Day With Meals.     Dispense:  180 tablet     Refill:  3     Return in about 6 months (around 11/10/2019) for Recheck.

## 2019-07-10 RX ORDER — APIXABAN 5 MG/1
TABLET, FILM COATED ORAL
Qty: 60 TABLET | Refills: 2 | Status: SHIPPED | OUTPATIENT
Start: 2019-07-10 | End: 2020-02-11 | Stop reason: ALTCHOICE

## 2019-11-14 ENCOUNTER — OFFICE VISIT (OUTPATIENT)
Dept: FAMILY MEDICINE CLINIC | Facility: CLINIC | Age: 84
End: 2019-11-14

## 2019-11-14 VITALS
HEART RATE: 77 BPM | BODY MASS INDEX: 24.45 KG/M2 | OXYGEN SATURATION: 98 % | HEIGHT: 67 IN | DIASTOLIC BLOOD PRESSURE: 70 MMHG | WEIGHT: 155.8 LBS | SYSTOLIC BLOOD PRESSURE: 150 MMHG

## 2019-11-14 DIAGNOSIS — I10 ESSENTIAL HYPERTENSION: Chronic | ICD-10-CM

## 2019-11-14 DIAGNOSIS — Z00.00 MEDICARE ANNUAL WELLNESS VISIT, SUBSEQUENT: Primary | ICD-10-CM

## 2019-11-14 DIAGNOSIS — J43.1 PANLOBULAR EMPHYSEMA (HCC): Chronic | ICD-10-CM

## 2019-11-14 DIAGNOSIS — R21 RASH: ICD-10-CM

## 2019-11-14 PROCEDURE — 99214 OFFICE O/P EST MOD 30 MIN: CPT | Performed by: GENERAL PRACTICE

## 2019-11-14 RX ORDER — METOPROLOL SUCCINATE 25 MG/1
25 TABLET, EXTENDED RELEASE ORAL DAILY
Qty: 90 TABLET | Refills: 3 | Status: SHIPPED | OUTPATIENT
Start: 2019-11-14 | End: 2020-02-20

## 2019-11-14 RX ORDER — TRIAMCINOLONE ACETONIDE 1 MG/G
CREAM TOPICAL 2 TIMES DAILY
Qty: 80 G | Refills: 0 | Status: SHIPPED | OUTPATIENT
Start: 2019-11-14 | End: 2020-02-20 | Stop reason: SDUPTHER

## 2019-11-14 NOTE — PROGRESS NOTES
Subjective   Maciej Funk is a 84 y.o. male.   Chief Complaint   Patient presents with   • Hypertension   • Rash     on both legs left arm     For review and evaluation of management of chronic medical problems. Has a rash on arms and legs for last 6 weeks. Thinks it is related to carvedilol as stopped and is a little better.   Hypertension   This is a chronic problem. The current episode started more than 1 year ago. The problem is unchanged. The problem is controlled. Pertinent negatives include no headaches, neck pain or palpitations. There are no associated agents to hypertension. Risk factors for coronary artery disease include dyslipidemia and smoking/tobacco exposure. Current antihypertension treatment includes ACE inhibitors. The current treatment provides significant improvement. There are no compliance problems.    COPD   There is no cough or wheezing. This is a chronic problem. The current episode started more than 1 year ago. The problem occurs constantly. The problem has been unchanged. Pertinent negatives include no ear pain, fever, headaches, rhinorrhea, sneezing or sore throat. His symptoms are aggravated by smoking.      The following portions of the patient's history were reviewed and updated as appropriate: allergies, current medications, past social history and problem list.    Outpatient Medications Prior to Visit   Medication Sig Dispense Refill   • aspirin 81 MG chewable tablet Chew 81 mg Daily.     • ELIQUIS 5 MG tablet tablet TAKE 1 TABLET EVERY 12 HOURS 60 tablet 2   • latanoprost (XALATAN) 0.005 % ophthalmic solution Administer 1 drop into the left eye Every Night.     • lisinopril (PRINIVIL,ZESTRIL) 20 MG tablet TAKE 1 TABLET BY MOUTH EVERY DAY 90 tablet 3   • Multiple Vitamins-Minerals (VISION-HECTOR PRESERVE PO) Take 1 capsule by mouth 2 (Two) Times a Day. PreserVision Lutein 226 mg-200 unit-5 mg-0.8 mg capsule      • simvastatin (ZOCOR) 20 MG tablet TAKE 1 TABLET BY MOUTH AT  "BEDTIME 90 tablet 3   • carvedilol (COREG) 6.25 MG tablet Take 1 tablet by mouth 2 (Two) Times a Day With Meals. 180 tablet 3     No facility-administered medications prior to visit.        Review of Systems   Constitutional: Negative.  Negative for chills, fatigue, fever and unexpected weight change.   HENT: Negative.  Negative for congestion, ear pain, hearing loss, nosebleeds, rhinorrhea, sneezing, sore throat and tinnitus.    Eyes: Negative.  Negative for discharge.   Respiratory: Negative.  Negative for cough and wheezing.    Cardiovascular: Negative.  Negative for palpitations.   Gastrointestinal: Negative.  Negative for abdominal pain, constipation, diarrhea, nausea and vomiting.   Endocrine: Negative.    Genitourinary: Negative.  Negative for dysuria, frequency and urgency.   Musculoskeletal: Negative.  Negative for arthralgias, back pain, joint swelling and neck pain.   Skin: Negative.  Negative for rash.   Allergic/Immunologic: Negative.    Neurological: Negative.  Negative for dizziness, weakness, numbness and headaches.   Hematological: Negative.  Negative for adenopathy.   Psychiatric/Behavioral: Negative.  Negative for dysphoric mood and sleep disturbance. The patient is not nervous/anxious.      Objective   Visit Vitals  /70   Pulse 77   Ht 170.2 cm (67\")   Wt 70.7 kg (155 lb 12.8 oz)   SpO2 98%   BMI 24.40 kg/m²     Physical Exam   Constitutional: He is oriented to person, place, and time. He appears well-developed and well-nourished. No distress.   HENT:   Head: Normocephalic.   Nose: Nose normal.   Mouth/Throat: Oropharynx is clear and moist.   Eyes: Conjunctivae and EOM are normal. Pupils are equal, round, and reactive to light. Right eye exhibits no discharge. Left eye exhibits no discharge.   Neck: No thyromegaly present.   Cardiovascular: Normal rate, regular rhythm, normal heart sounds and intact distal pulses.   No murmur heard.  Pulmonary/Chest: Effort normal. He has decreased breath " sounds.   Musculoskeletal: He exhibits no edema.   Lymphadenopathy:     He has no cervical adenopathy.   Neurological: He is alert and oriented to person, place, and time.   Skin: Skin is warm and dry.        Psychiatric: He has a normal mood and affect.   Nursing note and vitals reviewed.    Assessment/Plan   Problem List Items Addressed This Visit        Cardiovascular and Mediastinum    Essential hypertension (Chronic)    Relevant Medications    metoprolol succinate XL (TOPROL XL) 25 MG 24 hr tablet    Other Relevant Orders    CBC & Differential    Comprehensive Metabolic Panel    Lipid Panel    Urinalysis With Microscopic - Urine, Clean Catch       Respiratory    Emphysema of lung (CMS/HCC) (Chronic)      Other Visit Diagnoses     Medicare annual wellness visit, subsequent    -  Primary    Rash        Relevant Medications    triamcinolone (KENALOG) 0.1 % cream         Replace carvedilol with metoprolol.  Triamcinolone cream to the rash.  Recheck if not improving.  Once again encouraged to stop smoking.  He is not interested at this time.    New Medications Ordered This Visit   Medications   • metoprolol succinate XL (TOPROL XL) 25 MG 24 hr tablet     Sig: Take 1 tablet by mouth Daily.     Dispense:  90 tablet     Refill:  3     To  replace carvedilol   • triamcinolone (KENALOG) 0.1 % cream     Sig: Apply  topically to the appropriate area as directed 2 (Two) Times a Day. Sparingly to rash     Dispense:  80 g     Refill:  0     Return in about 3 months (around 2/14/2020) for Annual physical, medicare wellness visit.

## 2020-02-11 ENCOUNTER — OFFICE VISIT (OUTPATIENT)
Dept: CARDIAC SURGERY | Facility: CLINIC | Age: 85
End: 2020-02-11

## 2020-02-11 ENCOUNTER — LAB (OUTPATIENT)
Dept: LAB | Facility: HOSPITAL | Age: 85
End: 2020-02-11

## 2020-02-11 VITALS
WEIGHT: 153 LBS | OXYGEN SATURATION: 98 % | SYSTOLIC BLOOD PRESSURE: 154 MMHG | DIASTOLIC BLOOD PRESSURE: 80 MMHG | HEIGHT: 67 IN | HEART RATE: 70 BPM | BODY MASS INDEX: 24.01 KG/M2

## 2020-02-11 DIAGNOSIS — I10 ESSENTIAL HYPERTENSION: Chronic | ICD-10-CM

## 2020-02-11 DIAGNOSIS — I65.23 CAROTID STENOSIS, BILATERAL: Primary | ICD-10-CM

## 2020-02-11 DIAGNOSIS — E78.2 MIXED HYPERLIPIDEMIA: Chronic | ICD-10-CM

## 2020-02-11 DIAGNOSIS — I10 ESSENTIAL HYPERTENSION: ICD-10-CM

## 2020-02-11 DIAGNOSIS — F17.218 NICOTINE DEPENDENCE, CIGARETTES, WITH OTHER NICOTINE-INDUCED DISORDERS: ICD-10-CM

## 2020-02-11 PROBLEM — I82.432 ACUTE DEEP VEIN THROMBOSIS (DVT) OF POPLITEAL VEIN OF LEFT LOWER EXTREMITY (HCC): Status: RESOLVED | Noted: 2019-02-11 | Resolved: 2020-02-11

## 2020-02-11 PROBLEM — Z09 FOLLOW-UP SURGERY CARE: Status: RESOLVED | Noted: 2017-01-12 | Resolved: 2020-02-11

## 2020-02-11 LAB
ALBUMIN SERPL-MCNC: 4.4 G/DL (ref 3.5–5.2)
ALBUMIN/GLOB SERPL: 1.4 G/DL
ALP SERPL-CCNC: 72 U/L (ref 39–117)
ALT SERPL W P-5'-P-CCNC: 10 U/L (ref 1–41)
ANION GAP SERPL CALCULATED.3IONS-SCNC: 13.6 MMOL/L (ref 5–15)
AST SERPL-CCNC: 15 U/L (ref 1–40)
BACTERIA UR QL AUTO: ABNORMAL /HPF
BASOPHILS # BLD AUTO: 0.05 10*3/MM3 (ref 0–0.2)
BASOPHILS NFR BLD AUTO: 0.7 % (ref 0–1.5)
BILIRUB SERPL-MCNC: 1.1 MG/DL (ref 0.2–1.2)
BILIRUB UR QL STRIP: NEGATIVE
BUN BLD-MCNC: 14 MG/DL (ref 8–23)
BUN/CREAT SERPL: 14.1 (ref 7–25)
CALCIUM SPEC-SCNC: 9.5 MG/DL (ref 8.6–10.5)
CHLORIDE SERPL-SCNC: 98 MMOL/L (ref 98–107)
CHOLEST SERPL-MCNC: 162 MG/DL (ref 0–200)
CLARITY UR: CLEAR
CO2 SERPL-SCNC: 25.4 MMOL/L (ref 22–29)
COLOR UR: YELLOW
CREAT BLD-MCNC: 0.99 MG/DL (ref 0.76–1.27)
DEPRECATED RDW RBC AUTO: 39.8 FL (ref 37–54)
EOSINOPHIL # BLD AUTO: 0.15 10*3/MM3 (ref 0–0.4)
EOSINOPHIL NFR BLD AUTO: 2 % (ref 0.3–6.2)
ERYTHROCYTE [DISTWIDTH] IN BLOOD BY AUTOMATED COUNT: 12.3 % (ref 12.3–15.4)
GFR SERPL CREATININE-BSD FRML MDRD: 72 ML/MIN/1.73
GLOBULIN UR ELPH-MCNC: 3.2 GM/DL
GLUCOSE BLD-MCNC: 99 MG/DL (ref 65–99)
GLUCOSE UR STRIP-MCNC: NEGATIVE MG/DL
HCT VFR BLD AUTO: 42.2 % (ref 37.5–51)
HDLC SERPL-MCNC: 52 MG/DL (ref 40–60)
HGB BLD-MCNC: 14.4 G/DL (ref 13–17.7)
HGB UR QL STRIP.AUTO: NEGATIVE
HYALINE CASTS UR QL AUTO: ABNORMAL /LPF
IMM GRANULOCYTES # BLD AUTO: 0.02 10*3/MM3 (ref 0–0.05)
IMM GRANULOCYTES NFR BLD AUTO: 0.3 % (ref 0–0.5)
KETONES UR QL STRIP: NEGATIVE
LDLC SERPL CALC-MCNC: 95 MG/DL (ref 0–100)
LDLC/HDLC SERPL: 1.83 {RATIO}
LEUKOCYTE ESTERASE UR QL STRIP.AUTO: ABNORMAL
LYMPHOCYTES # BLD AUTO: 2.64 10*3/MM3 (ref 0.7–3.1)
LYMPHOCYTES NFR BLD AUTO: 34.8 % (ref 19.6–45.3)
MCH RBC QN AUTO: 30.7 PG (ref 26.6–33)
MCHC RBC AUTO-ENTMCNC: 34.1 G/DL (ref 31.5–35.7)
MCV RBC AUTO: 90 FL (ref 79–97)
MONOCYTES # BLD AUTO: 0.42 10*3/MM3 (ref 0.1–0.9)
MONOCYTES NFR BLD AUTO: 5.5 % (ref 5–12)
NEUTROPHILS # BLD AUTO: 4.31 10*3/MM3 (ref 1.7–7)
NEUTROPHILS NFR BLD AUTO: 56.7 % (ref 42.7–76)
NITRITE UR QL STRIP: NEGATIVE
NRBC BLD AUTO-RTO: 0 /100 WBC (ref 0–0.2)
PH UR STRIP.AUTO: 5.5 [PH] (ref 5–8)
PLATELET # BLD AUTO: 276 10*3/MM3 (ref 140–450)
PMV BLD AUTO: 9.3 FL (ref 6–12)
POTASSIUM BLD-SCNC: 4.3 MMOL/L (ref 3.5–5.2)
PROT SERPL-MCNC: 7.6 G/DL (ref 6–8.5)
PROT UR QL STRIP: NEGATIVE
RBC # BLD AUTO: 4.69 10*6/MM3 (ref 4.14–5.8)
RBC # UR: ABNORMAL /HPF
REF LAB TEST METHOD: ABNORMAL
SODIUM BLD-SCNC: 137 MMOL/L (ref 136–145)
SP GR UR STRIP: 1.02 (ref 1–1.03)
SQUAMOUS #/AREA URNS HPF: ABNORMAL /HPF
TRIGL SERPL-MCNC: 73 MG/DL (ref 0–150)
UROBILINOGEN UR QL STRIP: ABNORMAL
VLDLC SERPL-MCNC: 14.6 MG/DL (ref 5–40)
WBC NRBC COR # BLD: 7.59 10*3/MM3 (ref 3.4–10.8)
WBC UR QL AUTO: ABNORMAL /HPF

## 2020-02-11 PROCEDURE — 80053 COMPREHEN METABOLIC PANEL: CPT

## 2020-02-11 PROCEDURE — 36415 COLL VENOUS BLD VENIPUNCTURE: CPT

## 2020-02-11 PROCEDURE — 85025 COMPLETE CBC W/AUTO DIFF WBC: CPT

## 2020-02-11 PROCEDURE — 81001 URINALYSIS AUTO W/SCOPE: CPT

## 2020-02-11 PROCEDURE — 80061 LIPID PANEL: CPT

## 2020-02-11 PROCEDURE — 99214 OFFICE O/P EST MOD 30 MIN: CPT | Performed by: NURSE PRACTITIONER

## 2020-02-11 NOTE — PATIENT INSTRUCTIONS
mild Carotid Artery Stenosis bilateral remained stable Asymptomatic   Medical Management: ASA,STATIN   If you should experience any neurological symptoms including but not limited to visual or speech disturbances confusion, seizures, or weakness of limbs of one side of your body notify [facility] center immediately for evaluation or if after hours present to the nearest Emergency Department.    Return 1 year    Smoking Cessation Advised

## 2020-02-12 ENCOUNTER — TELEPHONE (OUTPATIENT)
Dept: FAMILY MEDICINE CLINIC | Facility: CLINIC | Age: 85
End: 2020-02-12

## 2020-02-19 NOTE — PROGRESS NOTES
CVTS Office Progress Note       Subjective   Patient ID: Maciej Funk is a 84 y.o. male is here today for follow-up.    Chief Complaint:    Chief Complaint   Patient presents with   • Follow-up     1 yr carotid stenosis       The following portions of the patient's history were reviewed and updated as appropriate: allergies, current medications, past family history, past medical history, past social history, past surgical history and problem list.  Recent images independently reviewed.  Available laboratory values reviewed.    PCP:  Debbi Jackson MD    83 y.o. male with HTN, AAA, COPD, dyslipidemia, carotid stensosi.  smokes 1 PPD.  Bilateral carotid bruits noted on exam.  Unable to see out of LEFT eye x years.  Mild dizziness occasional x years.  Activity level good, mows yard, rakes leaves.  Plavix started for severe carotid stenosis on duplex imaging.  Recovering well from CEA.  No other associated symptoms or modifying factors.     11/9/2016 Carotid Duplex:  AISHA 50-79% (127cm/s), antegrade vert.  LICA 80-99% (522cm/s), antegrade vert.  12/9/2016 CTA Carotids:  AISHA 50%, LICA 95%  1/4/2017 LEFT CEA  2/27/2017 Carotid Duplex:  LICA normal (77cm/s), antegrade vert.  11/20/2017 Carotid Duplex:  AISHA 50-69% (99cm/s), antegrade vert.  LICA 0-49% (87cm/s), antegrade vert.  1/2019: Carotid Duplex: AISHA 0-49% (102/1.9) antegrade. LICA 0-49% (81/2.5) antegrade  2/11/2020: Carotid Duplex: RIGHT 0-49% (91/1.5) LEFT 0-49% (90/1.4) Antegrade        Past Medical History:   Diagnosis Date   • Abdominal aortic aneurysm (CMS/HCC)    • Carotid stenosis, bilateral    • Cataract    • Emphysema of lung (CMS/HCC)    • Herpes zoster    • Hyperlipidemia    • Hypertension      Past Surgical History:   Procedure Laterality Date   • CARDIAC CATHETERIZATION     • CATARACT EXTRACTION W/ INTRAOCULAR LENS IMPLANT Left 2/2/2018   • CATARACT EXTRACTION W/ INTRAOCULAR LENS IMPLANT Right 2/9/2018   • COLONOSCOPY     • COLONOSCOPY W/  POLYPECTOMY     • FEMUR IM NAILING/RODDING Left 11/21/2018    Procedure: FEMUR INTRAMEDULLARY NAILING/RODDING;  Surgeon: Wu Hare MD;  Location: [facility];  Service: Orthopedics   • HAND SURGERY       Family History   Problem Relation Age of Onset   • Breast cancer Mother    • Cancer Mother    • Hypertension Father    • Kidney disease Father    • Kidney disease Brother      Social History     Tobacco Use   • Smoking status: Current Every Day Smoker     Packs/day: 1.00     Years: 60.00     Pack years: 60.00     Types: Cigarettes   • Smokeless tobacco: Former User   Substance Use Topics   • Alcohol use: No   • Drug use: No       ALLERGIES:   Patient has no known allergies.    MEDICATIONS:      Current Outpatient Medications:   •  aspirin 81 MG chewable tablet, Chew 81 mg Daily., Disp: , Rfl:   •  latanoprost (XALATAN) 0.005 % ophthalmic solution, Administer 1 drop into the left eye Every Night., Disp: , Rfl:   •  lisinopril (PRINIVIL,ZESTRIL) 20 MG tablet, TAKE 1 TABLET BY MOUTH EVERY DAY, Disp: 90 tablet, Rfl: 3  •  metoprolol succinate XL (TOPROL XL) 25 MG 24 hr tablet, Take 1 tablet by mouth Daily., Disp: 90 tablet, Rfl: 3  •  Multiple Vitamins-Minerals (VISION-HCETOR PRESERVE PO), Take 1 capsule by mouth 2 (Two) Times a Day. PreserVision Lutein 226 mg-200 unit-5 mg-0.8 mg capsule , Disp: , Rfl:   •  simvastatin (ZOCOR) 20 MG tablet, TAKE 1 TABLET BY MOUTH AT BEDTIME, Disp: 90 tablet, Rfl: 3  •  triamcinolone (KENALOG) 0.1 % cream, Apply  topically to the appropriate area as directed 2 (Two) Times a Day. Sparingly to rash, Disp: 80 g, Rfl: 0    Review of Systems   HENT: Positive for hearing loss.    Eyes: Negative for visual disturbance.   Cardiovascular: Negative for claudication and cyanosis.   Respiratory: Negative for shortness of breath.    Skin: Negative for color change and nail changes.   Musculoskeletal: Negative for muscle weakness.   Gastrointestinal: Negative for dysphagia.      Neurological: Negative for focal weakness, light-headedness, loss of balance, numbness, paresthesias and weakness.   Psychiatric/Behavioral: Negative for altered mental status.   All other systems reviewed and are negative.       Objective       Vitals:    02/11/20 1307   BP: 154/80   Pulse: 70   SpO2: 98%      Body mass index is 23.96 kg/m².  Physical Exam   Constitutional: He is oriented to person, place, and time. He appears well-developed.   HENT:   Head: Normocephalic.   Eyes: EOM are normal.   Neck: Neck supple. Carotid bruit is not present.   Cardiovascular: Normal rate and normal heart sounds.   Pulses:       Dorsalis pedis pulses are 1+ on the right side, and 1+ on the left side.        Posterior tibial pulses are 1+ on the right side, and 1+ on the left side.   Pulmonary/Chest: Effort normal and breath sounds normal.   Abdominal: Soft. Bowel sounds are normal.   Musculoskeletal: Normal range of motion.   Gait normal   Neurological: He is alert and oriented to person, place, and time.   Skin: Skin is warm and dry. Capillary refill takes less than 2 seconds.   No venous staining   Psychiatric: He has a normal mood and affect. Thought content normal.   Vitals reviewed.        Assessment & Plan     Independent Review of Radiographic Studies:  Detailed discussion regarding risks, benefits, and treatment plan. Images independently reviewed. Patient understands, agrees, and wishes to proceed with plan.     1. Carotid stenosis, bilateral  mild Carotid Artery Stenosis bilateral remained stable Asymptomatic   Medical Management: ASA,STATIN   If you should experience any neurological symptoms including but not limited to visual or speech disturbances confusion, seizures, or weakness of limbs of one side of your body notify Heart and Vascular center immediately for evaluation or if after hours present to the nearest Emergency Department.    Return 1 year  - Duplex Carotid Ultrasound CAR; Future    2. Essential  hypertension  Partially controlled  Follow up PCP    3. Mixed hyperlipidemia  Lipid-lowering therapy has been proven beneficial in patients with vascular disease. Current guidelines recommend statin treatment for all patients with PAD and carotid stenosis. Statins are beneficial in preventing cardiovascular events, increasing functional capacity and lower the risk of adverse limb loss in PAD. Statins decrease the progression of plaque formation and may improve peripheral vessel lining, and aid in reversing atherosclerosis.       4. Nicotine dependence, cigarettes, with other nicotine-induced disorders  Smoking cessation assistance options offered including behavioral counseling (Smoking Cessation Classes), Nicotine replacement therapy (patches or gum), pharmacologic therapy (Chantix, Wellbutrin). Understands tobacco increases risk of expanding AAA, MI, CVA, PAD, carcinoma. Discussion and question answer period 5-7 minutes.               This document has been electronically signed by CHRISTINA Denny on February 19, 2020 3:48 PM

## 2020-02-20 ENCOUNTER — OFFICE VISIT (OUTPATIENT)
Dept: FAMILY MEDICINE CLINIC | Facility: CLINIC | Age: 85
End: 2020-02-20

## 2020-02-20 VITALS
WEIGHT: 155.9 LBS | HEIGHT: 67 IN | HEART RATE: 75 BPM | BODY MASS INDEX: 24.47 KG/M2 | OXYGEN SATURATION: 99 % | SYSTOLIC BLOOD PRESSURE: 160 MMHG | DIASTOLIC BLOOD PRESSURE: 80 MMHG

## 2020-02-20 DIAGNOSIS — J43.1 PANLOBULAR EMPHYSEMA (HCC): Chronic | ICD-10-CM

## 2020-02-20 DIAGNOSIS — I10 ESSENTIAL HYPERTENSION: Chronic | ICD-10-CM

## 2020-02-20 DIAGNOSIS — Z00.00 MEDICARE ANNUAL WELLNESS VISIT, SUBSEQUENT: Primary | ICD-10-CM

## 2020-02-20 DIAGNOSIS — E78.2 MIXED HYPERLIPIDEMIA: Chronic | ICD-10-CM

## 2020-02-20 PROCEDURE — 99214 OFFICE O/P EST MOD 30 MIN: CPT | Performed by: GENERAL PRACTICE

## 2020-02-20 PROCEDURE — G0439 PPPS, SUBSEQ VISIT: HCPCS | Performed by: GENERAL PRACTICE

## 2020-02-20 RX ORDER — TRIAMCINOLONE ACETONIDE 1 MG/G
CREAM TOPICAL 2 TIMES DAILY
Qty: 80 G | Refills: 2 | Status: SHIPPED | OUTPATIENT
Start: 2020-02-20 | End: 2020-08-20 | Stop reason: SDUPTHER

## 2020-02-20 RX ORDER — AMLODIPINE BESYLATE 5 MG/1
5 TABLET ORAL NIGHTLY
Qty: 90 TABLET | Refills: 3 | Status: SHIPPED | OUTPATIENT
Start: 2020-02-20 | End: 2020-08-20

## 2020-04-06 RX ORDER — SIMVASTATIN 20 MG
TABLET ORAL
Qty: 90 TABLET | Refills: 2 | Status: SHIPPED | OUTPATIENT
Start: 2020-04-06 | End: 2021-01-04

## 2020-04-06 RX ORDER — LISINOPRIL 20 MG/1
TABLET ORAL
Qty: 90 TABLET | Refills: 2 | Status: SHIPPED | OUTPATIENT
Start: 2020-04-06 | End: 2021-01-04

## 2020-08-20 ENCOUNTER — OFFICE VISIT (OUTPATIENT)
Dept: FAMILY MEDICINE CLINIC | Facility: CLINIC | Age: 85
End: 2020-08-20

## 2020-08-20 VITALS
HEIGHT: 67 IN | OXYGEN SATURATION: 98 % | SYSTOLIC BLOOD PRESSURE: 130 MMHG | BODY MASS INDEX: 25.27 KG/M2 | WEIGHT: 161 LBS | DIASTOLIC BLOOD PRESSURE: 64 MMHG | HEART RATE: 80 BPM

## 2020-08-20 DIAGNOSIS — J43.1 PANLOBULAR EMPHYSEMA (HCC): Chronic | ICD-10-CM

## 2020-08-20 DIAGNOSIS — I10 ESSENTIAL HYPERTENSION: Primary | Chronic | ICD-10-CM

## 2020-08-20 DIAGNOSIS — H61.23 CERUMEN DEBRIS ON TYMPANIC MEMBRANE OF BOTH EARS: ICD-10-CM

## 2020-08-20 PROCEDURE — 69209 REMOVE IMPACTED EAR WAX UNI: CPT | Performed by: GENERAL PRACTICE

## 2020-08-20 PROCEDURE — 99213 OFFICE O/P EST LOW 20 MIN: CPT | Performed by: GENERAL PRACTICE

## 2020-08-20 RX ORDER — TRIAMCINOLONE ACETONIDE 1 MG/G
CREAM TOPICAL 2 TIMES DAILY
Qty: 80 G | Refills: 2 | Status: SHIPPED | OUTPATIENT
Start: 2020-08-20 | End: 2021-01-13 | Stop reason: SDUPTHER

## 2020-08-20 NOTE — PROGRESS NOTES
Subjective   Maciej Funk is a 85 y.o. male.   Chief Complaint   Patient presents with   • Hypertension   • COPD     For review and evaluation of management of chronic medical problems. Having trouble hearing, thinks he has wax.  Hypertension   This is a chronic problem. The current episode started more than 1 year ago. The problem is unchanged. The problem is controlled. Pertinent negatives include no chest pain, headaches, neck pain, palpitations or shortness of breath. There are no associated agents to hypertension. Risk factors for coronary artery disease include dyslipidemia and smoking/tobacco exposure. Current antihypertension treatment includes ACE inhibitors. The current treatment provides significant improvement. There are no compliance problems.    COPD   There is no cough, shortness of breath or wheezing. This is a chronic problem. The current episode started more than 1 year ago. The problem occurs constantly. The problem has been unchanged. Pertinent negatives include no chest pain, ear pain, fever, headaches, myalgias, rhinorrhea, sneezing or sore throat. His symptoms are aggravated by smoking.      The following portions of the patient's history were reviewed and updated as appropriate: allergies, current medications, past social history and problem list.    Outpatient Medications Prior to Visit   Medication Sig Dispense Refill   • aspirin 81 MG chewable tablet Chew 81 mg Daily.     • lisinopril (PRINIVIL,ZESTRIL) 20 MG tablet TAKE 1 TABLET BY MOUTH EVERY DAY 90 tablet 2   • Multiple Vitamins-Minerals (VISION-HECTOR PRESERVE PO) Take 1 capsule by mouth 2 (Two) Times a Day. PreserVision Lutein 226 mg-200 unit-5 mg-0.8 mg capsule      • simvastatin (ZOCOR) 20 MG tablet TAKE 1 TABLET BY MOUTH AT BEDTIME 90 tablet 2   • latanoprost (XALATAN) 0.005 % ophthalmic solution Administer 1 drop into the left eye Every Night.     • amLODIPine (NORVASC) 5 MG tablet Take 1 tablet by mouth Every Night. 90  "tablet 3   • triamcinolone (KENALOG) 0.1 % cream Apply  topically to the appropriate area as directed 2 (Two) Times a Day. Sparingly to rash 80 g 2     No facility-administered medications prior to visit.        Review of Systems   Constitutional: Negative.  Negative for chills, fatigue, fever and unexpected weight change.   HENT: Negative.  Negative for congestion, ear pain, hearing loss, nosebleeds, rhinorrhea, sneezing, sore throat and tinnitus.    Eyes: Negative.  Negative for discharge.   Respiratory: Negative.  Negative for cough, shortness of breath and wheezing.    Cardiovascular: Negative.  Negative for chest pain and palpitations.   Gastrointestinal: Negative.  Negative for abdominal pain, constipation, diarrhea, nausea and vomiting.   Endocrine: Negative.    Genitourinary: Negative.  Negative for dysuria, frequency and urgency.   Musculoskeletal: Negative.  Negative for arthralgias, back pain, joint swelling, myalgias and neck pain.   Skin: Negative.  Negative for rash.   Allergic/Immunologic: Negative.    Neurological: Negative.  Negative for dizziness, weakness, numbness and headaches.   Hematological: Negative.  Negative for adenopathy.   Psychiatric/Behavioral: Negative.  Negative for dysphoric mood and sleep disturbance. The patient is not nervous/anxious.        Objective   Visit Vitals  /64   Pulse 80   Ht 170.2 cm (67\")   Wt 73 kg (161 lb)   SpO2 98%   BMI 25.22 kg/m²     Physical Exam   Constitutional: He is oriented to person, place, and time. He appears well-developed and well-nourished. No distress.   HENT:   Head: Normocephalic.   Nose: Nose normal.   Mouth/Throat: Oropharynx is clear and moist.   Bilateral cerumen - syringed with minimal success, still has large amount of wax present   Eyes: Pupils are equal, round, and reactive to light. Conjunctivae and EOM are normal. Right eye exhibits no discharge. Left eye exhibits no discharge.   Neck: No thyromegaly present.   Cardiovascular: " Normal rate, regular rhythm, normal heart sounds and intact distal pulses.   No murmur heard.  Pulmonary/Chest: Effort normal and breath sounds normal.   Musculoskeletal: He exhibits no edema.   Lymphadenopathy:     He has no cervical adenopathy.   Neurological: He is alert and oriented to person, place, and time.   Skin: Skin is warm and dry.   Psychiatric: He has a normal mood and affect.   Nursing note and vitals reviewed.      Notes brought forward are reviewed and updated if indicated.     Assessment/Plan   Problem List Items Addressed This Visit        Cardiovascular and Mediastinum    Essential hypertension - Primary (Chronic)    Relevant Orders    CBC & Differential    Comprehensive Metabolic Panel    Lipid Panel    Urinalysis With Culture If Indicated -       Respiratory    Emphysema of lung (CMS/HCC) (Chronic)      Other Visit Diagnoses     Cerumen debris on tympanic membrane of both ears             Continue current treatment. May need to see ENT for ears if not better.  I advised the patient of the risks in continuing to use tobacco.  I also discussed how to quit smoking and the patient has expressed the willingness to quit.     New Medications Ordered This Visit   Medications   • triamcinolone (KENALOG) 0.1 % cream     Sig: Apply  topically to the appropriate area as directed 2 (Two) Times a Day. Sparingly to rash     Dispense:  80 g     Refill:  2     Return in about 6 months (around 2/20/2021) for Annual physical, medicare wellness visit.

## 2020-08-25 ENCOUNTER — TELEPHONE (OUTPATIENT)
Dept: FAMILY MEDICINE CLINIC | Facility: CLINIC | Age: 85
End: 2020-08-25

## 2020-08-25 DIAGNOSIS — H91.93 BILATERAL HEARING LOSS, UNSPECIFIED HEARING LOSS TYPE: ICD-10-CM

## 2020-08-25 DIAGNOSIS — H61.23 BILATERAL IMPACTED CERUMEN: Primary | ICD-10-CM

## 2020-09-28 RX ORDER — METOPROLOL SUCCINATE 25 MG/1
TABLET, EXTENDED RELEASE ORAL
Qty: 90 TABLET | Refills: 2 | OUTPATIENT
Start: 2020-09-28

## 2020-10-08 RX ORDER — METOPROLOL SUCCINATE 25 MG/1
TABLET, EXTENDED RELEASE ORAL
Qty: 90 TABLET | Refills: 2 | Status: SHIPPED | OUTPATIENT
Start: 2020-10-08 | End: 2021-02-22

## 2021-01-04 RX ORDER — SIMVASTATIN 20 MG
TABLET ORAL
Qty: 90 TABLET | Refills: 1 | Status: SHIPPED | OUTPATIENT
Start: 2021-01-04 | End: 2021-02-22 | Stop reason: SDUPTHER

## 2021-01-04 RX ORDER — LISINOPRIL 20 MG/1
TABLET ORAL
Qty: 90 TABLET | Refills: 1 | Status: SHIPPED | OUTPATIENT
Start: 2021-01-04 | End: 2021-02-22 | Stop reason: SDUPTHER

## 2021-01-13 RX ORDER — TRIAMCINOLONE ACETONIDE 1 MG/G
CREAM TOPICAL 2 TIMES DAILY
Qty: 80 G | Refills: 6 | Status: SHIPPED | OUTPATIENT
Start: 2021-01-13 | End: 2021-08-31 | Stop reason: SDUPTHER

## 2021-01-28 ENCOUNTER — IMMUNIZATION (OUTPATIENT)
Dept: VACCINE CLINIC | Facility: HOSPITAL | Age: 86
End: 2021-01-28

## 2021-01-28 PROCEDURE — 91300 HC SARSCOV02 VAC 30MCG/0.3ML IM: CPT | Performed by: THORACIC SURGERY (CARDIOTHORACIC VASCULAR SURGERY)

## 2021-01-28 PROCEDURE — 0001A: CPT | Performed by: THORACIC SURGERY (CARDIOTHORACIC VASCULAR SURGERY)

## 2021-02-15 ENCOUNTER — LAB (OUTPATIENT)
Dept: LAB | Facility: HOSPITAL | Age: 86
End: 2021-02-15

## 2021-02-15 DIAGNOSIS — I10 ESSENTIAL HYPERTENSION: ICD-10-CM

## 2021-02-15 LAB
ALBUMIN SERPL-MCNC: 4.2 G/DL (ref 3.5–5.2)
ALBUMIN/GLOB SERPL: 1.3 G/DL
ALP SERPL-CCNC: 72 U/L (ref 39–117)
ALT SERPL W P-5'-P-CCNC: 8 U/L (ref 1–41)
ANION GAP SERPL CALCULATED.3IONS-SCNC: 9.1 MMOL/L (ref 5–15)
AST SERPL-CCNC: 9 U/L (ref 1–40)
BASOPHILS # BLD AUTO: 0.05 10*3/MM3 (ref 0–0.2)
BASOPHILS NFR BLD AUTO: 0.6 % (ref 0–1.5)
BILIRUB SERPL-MCNC: 0.8 MG/DL (ref 0–1.2)
BILIRUB UR QL STRIP: NEGATIVE
BUN SERPL-MCNC: 13 MG/DL (ref 8–23)
BUN/CREAT SERPL: 11.7 (ref 7–25)
CALCIUM SPEC-SCNC: 9.3 MG/DL (ref 8.6–10.5)
CHLORIDE SERPL-SCNC: 100 MMOL/L (ref 98–107)
CHOLEST SERPL-MCNC: 162 MG/DL (ref 0–200)
CLARITY UR: CLEAR
CO2 SERPL-SCNC: 28.9 MMOL/L (ref 22–29)
COLOR UR: YELLOW
CREAT SERPL-MCNC: 1.11 MG/DL (ref 0.76–1.27)
DEPRECATED RDW RBC AUTO: 45.1 FL (ref 37–54)
EOSINOPHIL # BLD AUTO: 0.17 10*3/MM3 (ref 0–0.4)
EOSINOPHIL NFR BLD AUTO: 2 % (ref 0.3–6.2)
ERYTHROCYTE [DISTWIDTH] IN BLOOD BY AUTOMATED COUNT: 12.9 % (ref 12.3–15.4)
GFR SERPL CREATININE-BSD FRML MDRD: 63 ML/MIN/1.73
GLOBULIN UR ELPH-MCNC: 3.2 GM/DL
GLUCOSE SERPL-MCNC: 97 MG/DL (ref 65–99)
GLUCOSE UR STRIP-MCNC: NEGATIVE MG/DL
HCT VFR BLD AUTO: 43.2 % (ref 37.5–51)
HDLC SERPL-MCNC: 48 MG/DL (ref 40–60)
HGB BLD-MCNC: 14.4 G/DL (ref 13–17.7)
HGB UR QL STRIP.AUTO: NEGATIVE
IMM GRANULOCYTES # BLD AUTO: 0.03 10*3/MM3 (ref 0–0.05)
IMM GRANULOCYTES NFR BLD AUTO: 0.3 % (ref 0–0.5)
KETONES UR QL STRIP: NEGATIVE
LDLC SERPL CALC-MCNC: 96 MG/DL (ref 0–100)
LDLC/HDLC SERPL: 1.97 {RATIO}
LEUKOCYTE ESTERASE UR QL STRIP.AUTO: NEGATIVE
LYMPHOCYTES # BLD AUTO: 2.96 10*3/MM3 (ref 0.7–3.1)
LYMPHOCYTES NFR BLD AUTO: 34.1 % (ref 19.6–45.3)
MCH RBC QN AUTO: 31.4 PG (ref 26.6–33)
MCHC RBC AUTO-ENTMCNC: 33.3 G/DL (ref 31.5–35.7)
MCV RBC AUTO: 94.3 FL (ref 79–97)
MONOCYTES # BLD AUTO: 0.52 10*3/MM3 (ref 0.1–0.9)
MONOCYTES NFR BLD AUTO: 6 % (ref 5–12)
NEUTROPHILS NFR BLD AUTO: 4.96 10*3/MM3 (ref 1.7–7)
NEUTROPHILS NFR BLD AUTO: 57 % (ref 42.7–76)
NITRITE UR QL STRIP: NEGATIVE
NRBC BLD AUTO-RTO: 0 /100 WBC (ref 0–0.2)
PH UR STRIP.AUTO: 6.5 [PH] (ref 5–8)
PLATELET # BLD AUTO: 254 10*3/MM3 (ref 140–450)
PMV BLD AUTO: 9.8 FL (ref 6–12)
POTASSIUM SERPL-SCNC: 4.4 MMOL/L (ref 3.5–5.2)
PROT SERPL-MCNC: 7.4 G/DL (ref 6–8.5)
PROT UR QL STRIP: ABNORMAL
RBC # BLD AUTO: 4.58 10*6/MM3 (ref 4.14–5.8)
SODIUM SERPL-SCNC: 138 MMOL/L (ref 136–145)
SP GR UR STRIP: 1.02 (ref 1–1.03)
TRIGL SERPL-MCNC: 98 MG/DL (ref 0–150)
UROBILINOGEN UR QL STRIP: ABNORMAL
VLDLC SERPL-MCNC: 18 MG/DL (ref 5–40)
WBC # BLD AUTO: 8.69 10*3/MM3 (ref 3.4–10.8)

## 2021-02-15 PROCEDURE — 80053 COMPREHEN METABOLIC PANEL: CPT

## 2021-02-15 PROCEDURE — 36415 COLL VENOUS BLD VENIPUNCTURE: CPT

## 2021-02-15 PROCEDURE — 85025 COMPLETE CBC W/AUTO DIFF WBC: CPT

## 2021-02-15 PROCEDURE — 80061 LIPID PANEL: CPT

## 2021-02-15 PROCEDURE — 81003 URINALYSIS AUTO W/O SCOPE: CPT

## 2021-02-18 ENCOUNTER — IMMUNIZATION (OUTPATIENT)
Dept: VACCINE CLINIC | Facility: HOSPITAL | Age: 86
End: 2021-02-18

## 2021-02-18 PROCEDURE — 91300 HC SARSCOV02 VAC 30MCG/0.3ML IM: CPT | Performed by: THORACIC SURGERY (CARDIOTHORACIC VASCULAR SURGERY)

## 2021-02-18 PROCEDURE — 0002A: CPT | Performed by: THORACIC SURGERY (CARDIOTHORACIC VASCULAR SURGERY)

## 2021-02-22 ENCOUNTER — OFFICE VISIT (OUTPATIENT)
Dept: FAMILY MEDICINE CLINIC | Facility: CLINIC | Age: 86
End: 2021-02-22

## 2021-02-22 VITALS
OXYGEN SATURATION: 95 % | SYSTOLIC BLOOD PRESSURE: 132 MMHG | HEART RATE: 90 BPM | WEIGHT: 162 LBS | BODY MASS INDEX: 25.43 KG/M2 | DIASTOLIC BLOOD PRESSURE: 72 MMHG | HEIGHT: 67 IN

## 2021-02-22 DIAGNOSIS — E78.2 MIXED HYPERLIPIDEMIA: Chronic | ICD-10-CM

## 2021-02-22 DIAGNOSIS — Z00.00 MEDICARE ANNUAL WELLNESS VISIT, SUBSEQUENT: Primary | ICD-10-CM

## 2021-02-22 DIAGNOSIS — J43.1 PANLOBULAR EMPHYSEMA (HCC): Chronic | ICD-10-CM

## 2021-02-22 DIAGNOSIS — I10 ESSENTIAL HYPERTENSION: Chronic | ICD-10-CM

## 2021-02-22 PROCEDURE — 99213 OFFICE O/P EST LOW 20 MIN: CPT | Performed by: GENERAL PRACTICE

## 2021-02-22 PROCEDURE — G0439 PPPS, SUBSEQ VISIT: HCPCS | Performed by: GENERAL PRACTICE

## 2021-02-22 RX ORDER — LISINOPRIL 20 MG/1
20 TABLET ORAL DAILY
Qty: 90 TABLET | Refills: 1 | Status: SHIPPED | OUTPATIENT
Start: 2021-02-22 | End: 2021-08-31 | Stop reason: SDUPTHER

## 2021-02-22 RX ORDER — SIMVASTATIN 20 MG
20 TABLET ORAL
Qty: 90 TABLET | Refills: 1 | Status: SHIPPED | OUTPATIENT
Start: 2021-02-22 | End: 2021-08-31 | Stop reason: SDUPTHER

## 2021-02-24 ENCOUNTER — OFFICE VISIT (OUTPATIENT)
Dept: CARDIAC SURGERY | Facility: CLINIC | Age: 86
End: 2021-02-24

## 2021-02-24 VITALS
WEIGHT: 162 LBS | SYSTOLIC BLOOD PRESSURE: 128 MMHG | OXYGEN SATURATION: 98 % | HEIGHT: 67 IN | HEART RATE: 79 BPM | DIASTOLIC BLOOD PRESSURE: 66 MMHG | BODY MASS INDEX: 25.43 KG/M2

## 2021-02-24 DIAGNOSIS — E78.2 MIXED HYPERLIPIDEMIA: Chronic | ICD-10-CM

## 2021-02-24 DIAGNOSIS — I65.23 CAROTID STENOSIS, BILATERAL: Primary | ICD-10-CM

## 2021-02-24 DIAGNOSIS — I10 ESSENTIAL HYPERTENSION: Chronic | ICD-10-CM

## 2021-02-24 DIAGNOSIS — F17.200 TOBACCO DEPENDENCE SYNDROME: ICD-10-CM

## 2021-02-24 PROCEDURE — 99214 OFFICE O/P EST MOD 30 MIN: CPT | Performed by: NURSE PRACTITIONER

## 2021-08-31 ENCOUNTER — OFFICE VISIT (OUTPATIENT)
Dept: FAMILY MEDICINE CLINIC | Facility: CLINIC | Age: 86
End: 2021-08-31

## 2021-08-31 VITALS
SYSTOLIC BLOOD PRESSURE: 120 MMHG | WEIGHT: 158 LBS | BODY MASS INDEX: 24.8 KG/M2 | OXYGEN SATURATION: 98 % | HEART RATE: 88 BPM | HEIGHT: 67 IN | DIASTOLIC BLOOD PRESSURE: 78 MMHG

## 2021-08-31 DIAGNOSIS — E78.2 MIXED HYPERLIPIDEMIA: Chronic | ICD-10-CM

## 2021-08-31 DIAGNOSIS — I10 ESSENTIAL HYPERTENSION: Primary | Chronic | ICD-10-CM

## 2021-08-31 DIAGNOSIS — J43.1 PANLOBULAR EMPHYSEMA (HCC): Chronic | ICD-10-CM

## 2021-08-31 PROCEDURE — 99214 OFFICE O/P EST MOD 30 MIN: CPT | Performed by: GENERAL PRACTICE

## 2021-08-31 RX ORDER — TRIAMCINOLONE ACETONIDE 1 MG/G
CREAM TOPICAL 2 TIMES DAILY
Qty: 80 G | Refills: 6 | Status: SHIPPED | OUTPATIENT
Start: 2021-08-31 | End: 2022-01-03 | Stop reason: SDUPTHER

## 2021-08-31 RX ORDER — LISINOPRIL 20 MG/1
20 TABLET ORAL DAILY
Qty: 90 TABLET | Refills: 1 | Status: SHIPPED | OUTPATIENT
Start: 2021-08-31 | End: 2022-01-03 | Stop reason: SDUPTHER

## 2021-08-31 RX ORDER — SIMVASTATIN 20 MG
20 TABLET ORAL
Qty: 90 TABLET | Refills: 1 | Status: SHIPPED | OUTPATIENT
Start: 2021-08-31 | End: 2022-01-03 | Stop reason: SDUPTHER

## 2022-01-03 DIAGNOSIS — E78.2 MIXED HYPERLIPIDEMIA: Chronic | ICD-10-CM

## 2022-01-03 DIAGNOSIS — I10 ESSENTIAL HYPERTENSION: Chronic | ICD-10-CM

## 2022-01-03 RX ORDER — TRIAMCINOLONE ACETONIDE 1 MG/G
CREAM TOPICAL 2 TIMES DAILY
Qty: 80 G | Refills: 6 | Status: SHIPPED | OUTPATIENT
Start: 2022-01-03 | End: 2022-02-28 | Stop reason: SDUPTHER

## 2022-01-03 RX ORDER — LISINOPRIL 20 MG/1
20 TABLET ORAL DAILY
Qty: 90 TABLET | Refills: 1 | Status: SHIPPED | OUTPATIENT
Start: 2022-01-03 | End: 2022-02-28 | Stop reason: SDUPTHER

## 2022-01-03 RX ORDER — SIMVASTATIN 20 MG
20 TABLET ORAL
Qty: 90 TABLET | Refills: 1 | Status: SHIPPED | OUTPATIENT
Start: 2022-01-03 | End: 2022-02-28 | Stop reason: SDUPTHER

## 2022-01-03 NOTE — TELEPHONE ENCOUNTER
Incoming Refill Request      Medication requested (name and dose): LISINOPRIIL  SIMVASTATIN  TRIAMCINOLONE    Pharmacy where request should be sent: [facility]    Additional details provided by patient:     Best call back number:     Does the patient have less than a 3 day supply:  [x] Yes  [] No    Sukh Helm Rep  01/03/22, 08:44 CST

## 2022-02-28 ENCOUNTER — LAB (OUTPATIENT)
Dept: LAB | Facility: HOSPITAL | Age: 87
End: 2022-02-28

## 2022-02-28 ENCOUNTER — OFFICE VISIT (OUTPATIENT)
Dept: FAMILY MEDICINE CLINIC | Facility: CLINIC | Age: 87
End: 2022-02-28

## 2022-02-28 VITALS
WEIGHT: 157.5 LBS | DIASTOLIC BLOOD PRESSURE: 70 MMHG | HEART RATE: 81 BPM | SYSTOLIC BLOOD PRESSURE: 140 MMHG | OXYGEN SATURATION: 98 % | HEIGHT: 67 IN | BODY MASS INDEX: 24.72 KG/M2

## 2022-02-28 DIAGNOSIS — E78.2 MIXED HYPERLIPIDEMIA: ICD-10-CM

## 2022-02-28 DIAGNOSIS — I10 ESSENTIAL HYPERTENSION: ICD-10-CM

## 2022-02-28 DIAGNOSIS — Z00.00 MEDICARE ANNUAL WELLNESS VISIT, SUBSEQUENT: Primary | ICD-10-CM

## 2022-02-28 PROCEDURE — 85025 COMPLETE CBC W/AUTO DIFF WBC: CPT | Performed by: GENERAL PRACTICE

## 2022-02-28 PROCEDURE — 83721 ASSAY OF BLOOD LIPOPROTEIN: CPT | Performed by: GENERAL PRACTICE

## 2022-02-28 PROCEDURE — 36415 COLL VENOUS BLD VENIPUNCTURE: CPT | Performed by: GENERAL PRACTICE

## 2022-02-28 PROCEDURE — 80053 COMPREHEN METABOLIC PANEL: CPT | Performed by: GENERAL PRACTICE

## 2022-02-28 PROCEDURE — G0439 PPPS, SUBSEQ VISIT: HCPCS | Performed by: GENERAL PRACTICE

## 2022-02-28 RX ORDER — LISINOPRIL 20 MG/1
20 TABLET ORAL DAILY
Qty: 90 TABLET | Refills: 1 | Status: SHIPPED | OUTPATIENT
Start: 2022-02-28 | End: 2022-06-27 | Stop reason: SDUPTHER

## 2022-02-28 RX ORDER — SIMVASTATIN 20 MG
20 TABLET ORAL
Qty: 90 TABLET | Refills: 1 | Status: SHIPPED | OUTPATIENT
Start: 2022-02-28 | End: 2022-06-27 | Stop reason: SDUPTHER

## 2022-02-28 RX ORDER — TRIAMCINOLONE ACETONIDE 1 MG/G
CREAM TOPICAL 2 TIMES DAILY
Qty: 80 G | Refills: 6 | Status: SHIPPED | OUTPATIENT
Start: 2022-02-28 | End: 2022-06-27 | Stop reason: SDUPTHER

## 2022-02-28 NOTE — PATIENT INSTRUCTIONS
Medicare Wellness  Personal Prevention Plan of Service     Date of Office Visit:  2022  Encounter Provider:  Debbi Jackson MD  Place of Service:  [facility]  Patient Name: Maciej Funk  :  1935    As part of the Medicare Wellness portion of your visit today, we are providing you with this personalized preventive plan of services (PPPS). This plan is based upon recommendations of the [place] Preventive Services Task Force (USPSTF) and the Advisory Committee on Immunization Practices (ACIP).    This lists the preventive care services that should be considered, and provides dates of when you are due. Items listed as completed are up-to-date and do not require any further intervention.    Health Maintenance   Topic Date Due   • TDAP/TD VACCINES (1 - Tdap) Never done   • ZOSTER VACCINE (2 of 2) 2017   • COVID-19 Vaccine (3 - Booster for Pfizer series) 2021   • INFLUENZA VACCINE  2021   • LIPID PANEL  02/15/2022   • ANNUAL WELLNESS VISIT  2022   • COLORECTAL CANCER SCREENING  2022 (Originally 1935)   • Pneumococcal Vaccine 65+  Completed       No orders of the defined types were placed in this encounter.      No follow-ups on file.

## 2022-02-28 NOTE — PROGRESS NOTES
The ABCs of the Annual Wellness Visit  Subsequent Medicare Wellness Visit    Chief Complaint   Patient presents with   • Annual Exam   • Hypertension   • Medicare Wellness-subsequent      Subjective    History of Present Illness:  Maciej Funk is a 86 y.o. male who presents for a Subsequent Medicare Wellness Visit. Labs pending.     The following portions of the patient's history were reviewed and   updated as appropriate: allergies, current medications, past family history, past medical history, past social history, past surgical history and problem list.    Compared to one year ago, the patient feels his physical   health is the same.    Compared to one year ago, the patient feels his mental   health is the same.    Recent Hospitalizations:  He was not admitted to the hospital during the last year.       Current Medical Providers:  Patient Care Team:  Debbi Jackson MD as PCP - General (Family Medicine)    Outpatient Medications Prior to Visit   Medication Sig Dispense Refill   • aspirin 81 MG chewable tablet Chew 81 mg Daily.     • latanoprost (XALATAN) 0.005 % ophthalmic solution Administer 1 drop into the left eye Every Night.     • Multiple Vitamins-Minerals (VISION-HECTOR PRESERVE PO) Take 1 capsule by mouth 2 (Two) Times a Day. PreserVision Lutein 226 mg-200 unit-5 mg-0.8 mg capsule      • lisinopril (PRINIVIL,ZESTRIL) 20 MG tablet Take 1 tablet by mouth Daily. 90 tablet 1   • simvastatin (ZOCOR) 20 MG tablet Take 1 tablet by mouth every night at bedtime. 90 tablet 1   • triamcinolone (KENALOG) 0.1 % cream Apply  topically to the appropriate area as directed 2 (Two) Times a Day. Sparingly to rash 80 g 6     No facility-administered medications prior to visit.       No opioid medication identified on active medication list. I have reviewed chart for other potential  high risk medication/s and harmful drug interactions in the elderly.          Aspirin is on active medication list. Aspirin use is  "indicated based on review of current medical condition/s. Pros and cons of this therapy have been discussed today. Benefits of this medication outweigh potential harm.  Patient has been encouraged to continue taking this medication.  .      Patient Active Problem List   Diagnosis   • Essential hypertension   • History of adenomatous polyp of colon   • Hyperlipidemia   • Tobacco dependence syndrome   • Nicotine dependence, cigarettes, with other nicotine-induced disorders   • Carotid stenosis, bilateral   • Optic atrophy   • Nonexudative age-related macular degeneration   • Nuclear cataract   • Closed displaced intertrochanteric fracture of left femur (HCC)   • Pulmonary hypertension (HCC)   • Emphysema of lung (HCC)   • Atherosclerosis     Advance Care Planning  Advance Directive is on file.  ACP discussion was held with the patient during this visit. Patient has an advance directive in EMR which is still valid.           Objective    Vitals:    02/28/22 1506   BP: 140/70   Pulse: 81   SpO2: 98%   Weight: 71.4 kg (157 lb 8 oz)   Height: 170.2 cm (67\")   PainSc: 0-No pain     BMI Readings from Last 1 Encounters:   02/28/22 24.67 kg/m²   BMI is within normal parameters. No follow-up required.    Does the patient have evidence of cognitive impairment? No    Physical Exam  Constitutional:       General: He is not in acute distress.     Appearance: He is well-developed.   HENT:      Head: Normocephalic and atraumatic.      Nose: Nose normal.   Eyes:      General:         Right eye: No discharge.         Left eye: No discharge.      Conjunctiva/sclera: Conjunctivae normal.      Pupils: Pupils are equal, round, and reactive to light.   Neck:      Thyroid: No thyromegaly.   Cardiovascular:      Rate and Rhythm: Normal rate and regular rhythm.      Heart sounds: Normal heart sounds. No murmur heard.      Pulmonary:      Effort: Pulmonary effort is normal. No respiratory distress.      Breath sounds: Normal breath sounds. No " wheezing or rales.   Chest:      Chest wall: No tenderness.   Abdominal:      General: Bowel sounds are normal. There is no distension.      Palpations: Abdomen is soft. There is no mass.      Tenderness: There is no abdominal tenderness.      Hernia: No hernia is present.   Musculoskeletal:         General: No deformity. Normal range of motion.      Cervical back: Normal range of motion.   Lymphadenopathy:      Cervical: No cervical adenopathy.   Skin:     General: Skin is warm and dry.      Coloration: Skin is not pale.      Findings: No rash.   Neurological:      Mental Status: He is alert and oriented to person, place, and time.      Deep Tendon Reflexes: Reflexes are normal and symmetric.   Psychiatric:         Behavior: Behavior normal.         Thought Content: Thought content normal.         Judgment: Judgment normal.       HEALTH RISK ASSESSMENT    Smoking Status:  Social History     Tobacco Use   Smoking Status Current Every Day Smoker   • Packs/day: 1.00   • Years: 60.00   • Pack years: 60.00   • Types: Cigarettes   Smokeless Tobacco Former User     Alcohol Consumption:  Social History     Substance and Sexual Activity   Alcohol Use No     Fall Risk Screen:    [facility] Fall Risk Assessment was completed, and patient is at MODERATE risk for falls. Assessment completed on:2/28/2022    Depression Screening:  PHQ-2/PHQ-9 Depression Screening 2/28/2022   Little interest or pleasure in doing things 0   Feeling down, depressed, or hopeless 0   Trouble falling or staying asleep, or sleeping too much 0   Feeling tired or having little energy 0   Poor appetite or overeating 0   Feeling bad about yourself - or that you are a failure or have let yourself or your family down 0   Trouble concentrating on things, such as reading the newspaper or watching television 1   Moving or speaking so slowly that other people could have noticed. Or the opposite - being so fidgety or restless that you have been moving around a lot  more than usual 0   Thoughts that you would be better off dead, or of hurting yourself in some way 0   Total Score 1   If you checked off any problems, how difficult have these problems made it for you to do your work, take care of things at home, or get along with other people? Not difficult at all       Health Habits and Functional and Cognitive Screening:  Functional & Cognitive Status 2/28/2022   Do you have difficulty preparing food and eating? No   Do you have difficulty bathing yourself, getting dressed or grooming yourself? No   Do you have difficulty using the toilet? No   Do you have difficulty moving around from place to place? No   Do you have trouble with steps or getting out of a bed or a chair? No   Current Diet Well Balanced Diet   Dental Exam Not up to date   Eye Exam Up to date   Exercise (times per week) 0 times per week   Current Exercises Include No Regular Exercise   Current Exercise Activities Include -   Do you need help using the phone?  Yes   Are you deaf or do you have serious difficulty hearing?  Yes   Do you need help with transportation? No   Do you need help shopping? No   Do you need help preparing meals?  No   Do you need help with housework?  No   Do you need help with laundry? No   Do you need help taking your medications? No   Do you need help managing money? No   Do you ever drive or ride in a car without wearing a seat belt? No   Have you felt unusual stress, anger or loneliness in the last month? No   Who do you live with? Spouse   If you need help, do you have trouble finding someone available to you? No   Have you been bothered in the last four weeks by sexual problems? No   Do you have difficulty concentrating, remembering or making decisions? No       Age-appropriate Screening Schedule:  Refer to the list below for future screening recommendations based on patient's age, sex and/or medical conditions. Orders for these recommended tests are listed in the plan section. The  patient has been provided with a written plan.    Health Maintenance   Topic Date Due   • TDAP/TD VACCINES (1 - Tdap) Never done   • ZOSTER VACCINE (2 of 2) 11/30/2017   • LIPID PANEL  02/15/2022   • INFLUENZA VACCINE  Completed              Assessment/Plan   CMS Preventative Services Quick Reference  Risk Factors Identified During Encounter  Fall Risk-High or Moderate  Immunizations Discussed/Encouraged (specific Immunizations; Tdap and Shingrix  Tobacco Use/Dependance (use dotphrase .tobaccocessation for documentation)  The above risks/problems have been discussed with the patient.  Follow up actions/plans if indicated are seen below in the Assessment/Plan Section.  Pertinent information has been shared with the patient in the After Visit Summary.    Diagnoses and all orders for this visit:    1. Medicare annual wellness visit, subsequent (Primary)    2. Mixed hyperlipidemia  -     simvastatin (ZOCOR) 20 MG tablet; Take 1 tablet by mouth every night at bedtime.  Dispense: 90 tablet; Refill: 1  -     CBC & Differential  -     Comprehensive Metabolic Panel  -     LDL Cholesterol, Direct    3. Essential hypertension  -     lisinopril (PRINIVIL,ZESTRIL) 20 MG tablet; Take 1 tablet by mouth Daily.  Dispense: 90 tablet; Refill: 1  -     CBC & Differential  -     Comprehensive Metabolic Panel  -     LDL Cholesterol, Direct    Other orders  -     triamcinolone (KENALOG) 0.1 % cream; Apply  topically to the appropriate area as directed 2 (Two) Times a Day. Sparingly to rash  Dispense: 80 g; Refill: 6        Follow Up:   Return in about 6 months (around 8/28/2022) for Recheck.   Will notify regarding results.     An After Visit Summary and PPPS were made available to the patient.  Information has been scanned into chart. Discussed importance of taking medications as prescribed. Encouraged healthy eating habits with low fat, low salt choices and working towards maintaining a healthy weight. Recommended regular exercise if  able as well as care to prevent falls including avoiding anything on the floor that they could slip or trip on such as throw rugs, making sure they have a bathmat to step onto when their feet are wet and having grab bars and railings where needed.

## 2022-03-01 LAB
ALBUMIN SERPL-MCNC: 4.2 G/DL (ref 3.5–5.2)
ALBUMIN/GLOB SERPL: 1.6 G/DL
ALP SERPL-CCNC: 64 U/L (ref 39–117)
ALT SERPL W P-5'-P-CCNC: 7 U/L (ref 1–41)
ANION GAP SERPL CALCULATED.3IONS-SCNC: 8.7 MMOL/L (ref 5–15)
ARTICHOKE IGE QN: 92 MG/DL (ref 0–100)
AST SERPL-CCNC: 11 U/L (ref 1–40)
BASOPHILS # BLD AUTO: 0.05 10*3/MM3 (ref 0–0.2)
BASOPHILS NFR BLD AUTO: 0.7 % (ref 0–1.5)
BILIRUB SERPL-MCNC: 1.1 MG/DL (ref 0–1.2)
BUN SERPL-MCNC: 13 MG/DL (ref 8–23)
BUN/CREAT SERPL: 12 (ref 7–25)
CALCIUM SPEC-SCNC: 9 MG/DL (ref 8.6–10.5)
CHLORIDE SERPL-SCNC: 100 MMOL/L (ref 98–107)
CO2 SERPL-SCNC: 28.3 MMOL/L (ref 22–29)
CREAT SERPL-MCNC: 1.08 MG/DL (ref 0.76–1.27)
DEPRECATED RDW RBC AUTO: 40.2 FL (ref 37–54)
EGFRCR SERPLBLD CKD-EPI 2021: 67.7 ML/MIN/1.73
EOSINOPHIL # BLD AUTO: 0.21 10*3/MM3 (ref 0–0.4)
EOSINOPHIL NFR BLD AUTO: 2.8 % (ref 0.3–6.2)
ERYTHROCYTE [DISTWIDTH] IN BLOOD BY AUTOMATED COUNT: 11.8 % (ref 12.3–15.4)
GLOBULIN UR ELPH-MCNC: 2.7 GM/DL
GLUCOSE SERPL-MCNC: 90 MG/DL (ref 65–99)
HCT VFR BLD AUTO: 39.8 % (ref 37.5–51)
HGB BLD-MCNC: 13.6 G/DL (ref 13–17.7)
IMM GRANULOCYTES # BLD AUTO: 0.02 10*3/MM3 (ref 0–0.05)
IMM GRANULOCYTES NFR BLD AUTO: 0.3 % (ref 0–0.5)
LYMPHOCYTES # BLD AUTO: 2.8 10*3/MM3 (ref 0.7–3.1)
LYMPHOCYTES NFR BLD AUTO: 37.5 % (ref 19.6–45.3)
MCH RBC QN AUTO: 31.6 PG (ref 26.6–33)
MCHC RBC AUTO-ENTMCNC: 34.2 G/DL (ref 31.5–35.7)
MCV RBC AUTO: 92.3 FL (ref 79–97)
MONOCYTES # BLD AUTO: 0.46 10*3/MM3 (ref 0.1–0.9)
MONOCYTES NFR BLD AUTO: 6.2 % (ref 5–12)
NEUTROPHILS NFR BLD AUTO: 3.93 10*3/MM3 (ref 1.7–7)
NEUTROPHILS NFR BLD AUTO: 52.5 % (ref 42.7–76)
NRBC BLD AUTO-RTO: 0 /100 WBC (ref 0–0.2)
PLATELET # BLD AUTO: 256 10*3/MM3 (ref 140–450)
PMV BLD AUTO: 9.8 FL (ref 6–12)
POTASSIUM SERPL-SCNC: 4.3 MMOL/L (ref 3.5–5.2)
PROT SERPL-MCNC: 6.9 G/DL (ref 6–8.5)
RBC # BLD AUTO: 4.31 10*6/MM3 (ref 4.14–5.8)
SODIUM SERPL-SCNC: 137 MMOL/L (ref 136–145)
WBC NRBC COR # BLD: 7.47 10*3/MM3 (ref 3.4–10.8)

## 2022-03-09 ENCOUNTER — OFFICE VISIT (OUTPATIENT)
Dept: CARDIAC SURGERY | Facility: CLINIC | Age: 87
End: 2022-03-09

## 2022-03-09 VITALS
BODY MASS INDEX: 25.27 KG/M2 | OXYGEN SATURATION: 98 % | HEART RATE: 86 BPM | SYSTOLIC BLOOD PRESSURE: 142 MMHG | HEIGHT: 67 IN | DIASTOLIC BLOOD PRESSURE: 76 MMHG | WEIGHT: 161 LBS

## 2022-03-09 DIAGNOSIS — E78.2 MIXED HYPERLIPIDEMIA: ICD-10-CM

## 2022-03-09 DIAGNOSIS — I65.23 CAROTID STENOSIS, BILATERAL: Primary | ICD-10-CM

## 2022-03-09 DIAGNOSIS — F17.200 TOBACCO DEPENDENCE SYNDROME: ICD-10-CM

## 2022-03-09 DIAGNOSIS — I10 ESSENTIAL HYPERTENSION: ICD-10-CM

## 2022-03-09 PROCEDURE — 99214 OFFICE O/P EST MOD 30 MIN: CPT | Performed by: NURSE PRACTITIONER

## 2022-03-09 NOTE — PROGRESS NOTES
CVTS Office Progress Note       Subjective   Patient ID: Maciej Funk is a 86 y.o. male is here today for follow-up.    Chief Complaint:    Chief Complaint   Patient presents with   • Carotid Artery Disease       The following portions of the patient's history were reviewed and updated as appropriate: allergies, current medications, past family history, past medical history, past social history, past surgical history and problem list.  Recent images independently reviewed.  Available laboratory values reviewed.    PCP:  Debbi Jackson MD    85 y.o. male with HTN, AAA, COPD, dyslipidemia, carotid stensosi.  smokes 1 PPD.  Bilateral carotid bruits noted on exam.  Unable to see out of LEFT eye x years.  Mild dizziness occasional x years.  Activity level good, mows yard, rakes leaves.  Plavix started for severe carotid stenosis on duplex imaging.  Recovering well from CEA.  No other associated symptoms or modifying factors.     11/9/2016 Carotid Duplex:  AISHA 50-79% (127cm/s), antegrade vert.  LICA 80-99% (522cm/s), antegrade vert.  12/9/2016 CTA Carotids:  AISHA 50%, LICA 95%  1/4/2017 LEFT CEA  2/27/2017 Carotid Duplex:  LICA normal (77cm/s), antegrade vert.  11/20/2017 Carotid Duplex:  AISHA 50-69% (99cm/s), antegrade vert.  LICA 0-49% (87cm/s), antegrade vert.  1/2019: Carotid Duplex: AISHA 0-49% (102/1.9) antegrade. LICA 0-49% (81/2.5) antegrade  2/11/2020: Carotid Duplex: RIGHT 0-49% (91/1.5) LEFT 0-49% (90/1.4) Antegrade  2/24/2021:  Carotid Duplex: RIGHT 0-49% (87/1.7) LEFT 0-49% (102/1.6) Antegrade   3/2022: Carotid Duplex: RIGHT 0-49% (120/31cm/s, ratio 1.5) LEFT 0-49% (96/34cm/s, ratio 1.7) Antegrade     Past Medical History:   Diagnosis Date   • Abdominal aortic aneurysm (HCC)    • Carotid stenosis, bilateral    • Cataract    • Emphysema of lung (HCC)    • Herpes zoster    • Hyperlipidemia    • Hypertension      Past Surgical History:   Procedure Laterality Date   • CARDIAC CATHETERIZATION     •  CATARACT EXTRACTION W/ INTRAOCULAR LENS IMPLANT Left 2/2/2018   • CATARACT EXTRACTION W/ INTRAOCULAR LENS IMPLANT Right 2/9/2018   • COLONOSCOPY     • COLONOSCOPY W/ POLYPECTOMY     • FEMUR IM NAILING/RODDING Left 11/21/2018    Procedure: FEMUR INTRAMEDULLARY NAILING/RODDING;  Surgeon: Wu Hare MD;  Location: [facility];  Service: Orthopedics   • HAND SURGERY       Family History   Problem Relation Age of Onset   • Breast cancer Mother    • Cancer Mother    • Hypertension Father    • Kidney disease Father    • Kidney disease Brother      Social History     Tobacco Use   • Smoking status: Current Every Day Smoker     Packs/day: 1.00     Years: 60.00     Pack years: 60.00     Types: Cigarettes   • Smokeless tobacco: Former User   Substance Use Topics   • Alcohol use: No   • Drug use: No       ALLERGIES:   Patient has no known allergies.    MEDICATIONS:      Current Outpatient Medications:   •  aspirin 81 MG chewable tablet, Chew 81 mg Daily., Disp: , Rfl:   •  latanoprost (XALATAN) 0.005 % ophthalmic solution, Administer 1 drop into the left eye Every Night., Disp: , Rfl:   •  lisinopril (PRINIVIL,ZESTRIL) 20 MG tablet, Take 1 tablet by mouth Daily., Disp: 90 tablet, Rfl: 1  •  Multiple Vitamins-Minerals (VISION-HECTOR PRESERVE PO), Take 1 capsule by mouth 2 (Two) Times a Day. PreserVision Lutein 226 mg-200 unit-5 mg-0.8 mg capsule, Disp: , Rfl:   •  simvastatin (ZOCOR) 20 MG tablet, Take 1 tablet by mouth every night at bedtime., Disp: 90 tablet, Rfl: 1  •  triamcinolone (KENALOG) 0.1 % cream, Apply  topically to the appropriate area as directed 2 (Two) Times a Day. Sparingly to rash, Disp: 80 g, Rfl: 6    Review of Systems   HENT: Positive for hearing loss.    Eyes: Negative for visual disturbance.   Cardiovascular: Negative for claudication and cyanosis.   Respiratory: Negative for shortness of breath.    Skin: Negative for color change and nail changes.   Musculoskeletal: Positive for arthritis.  Negative for muscle weakness.   Gastrointestinal: Negative for dysphagia.   Neurological: Negative for focal weakness, light-headedness, loss of balance, numbness, paresthesias and weakness.   Psychiatric/Behavioral: Negative for altered mental status.   All other systems reviewed and are negative.       Objective   Heart Rate:  [86] 86  BP: (142)/(76) 142/76   Vitals:    03/09/22 1314   BP: 142/76   Pulse: 86   SpO2: 98%      Body mass index is 25.22 kg/m².  Physical Exam   Constitutional: He is oriented to person, place, and time. He appears well-developed.   HENT:   Head: Normocephalic.   Neck: Carotid bruit is not present.   Cardiovascular: Normal rate and normal heart sounds.   Pulses:       Dorsalis pedis pulses are 1+ on the right side and 1+ on the left side.        Posterior tibial pulses are 1+ on the right side and 1+ on the left side.   Pulmonary/Chest: Effort normal and breath sounds normal.   Abdominal: Soft. Bowel sounds are normal.   Musculoskeletal: Normal range of motion.   Neurological: He is alert and oriented to person, place, and time. Gait normal.   Skin: Skin is warm and dry. Capillary refill takes less than 2 seconds.   No venous staining   Psychiatric: Thought content normal.   Vitals reviewed.        Assessment & Plan     Independent Review of Radiographic Studies:  Detailed discussion regarding risks, benefits, and treatment plan. Images independently reviewed. Patient understands, agrees, and wishes to proceed with plan.     1. Carotid stenosis, bilateral  mild Carotid Artery Stenosis bilateral remained stable Asymptomatic   Medical Management: ASA,STATIN   If you should experience any neurological symptoms including but not limited to visual or speech disturbances confusion, seizures, or weakness of limbs of one side of your body notify Heart and Vascular center immediately for evaluation or if after hours present to the nearest Emergency Department.    Return 1 yr   - Duplex Carotid  Ultrasound CAR; Future    2. Essential hypertension  controlled    3. Mixed hyperlipidemia  Lipid-lowering therapy has been proven beneficial in patients with cardio-vascular disease. Current guidelines recommend statin treatment for all patients with PAD,CAD and carotid stenosis. Statins are beneficial in preventing cardiovascular events, increasing functional capacity and lower the risk of adverse limb loss in PAD. Statins decrease the progression of plaque formation and may improve peripheral vessel lining, and aid in reversing atherosclerosis.       4. Tobacco dependence syndrome  Smoking cessation assistance options offered including behavioral counseling (Smoking Cessation Classes), Nicotine replacement therapy (patches or gum), pharmacologic therapy (Chantix, Wellbutrin). Understands tobacco increases risk of expanding AAA, MI, CVA, PAD, carcinoma. Discussion and question answer period 5-7 minutes. Maciej Funk  reports that he has been smoking cigarettes. He has a 60.00 pack-year smoking history. He has quit using smokeless tobacco.. I have educated him on the risk of diseases from using tobacco products such as cancer, COPD and heart disease.     I advised him to quit and he is not willing to quit.    I spent 5 minutes counseling the patient.      Advance Care Planning discussed and  Informational packet given to patient. Advance Care Plan on file: Yes       Patient's Body mass index is 25.22 kg/m². BMI is within normal parameters. No follow-up required..     Time spent 30 minutes in face to face evaluation, reviewing of medical history, tests, and procedures. Independent interpretation of vascular studies, ordering additional tests, documentation, and coordination of care.   Counseling and education with the patient and family regarding treatment options, plan of care, and hoped for outcomes. All questions answered.           This document has been electronically signed by CHRISTINA Denny  on March 9, 2022 14:47 CST

## 2022-03-09 NOTE — PATIENT INSTRUCTIONS
mild Carotid Artery Stenosis bilateral remained stable Asymptomatic   Medical Management: ASA,STATIN   If you should experience any neurological symptoms including but not limited to visual or speech disturbances confusion, seizures, or weakness of limbs of one side of your body notify Heart and Vascular center immediately for evaluation or if after hours present to the nearest Emergency Department.    Return 1 yr     Smoking cessation advised.  Tobacco increases risk of expanding AAA, MI, CVA, PAD, carcinoma.

## 2022-06-27 DIAGNOSIS — I10 ESSENTIAL HYPERTENSION: ICD-10-CM

## 2022-06-27 DIAGNOSIS — E78.2 MIXED HYPERLIPIDEMIA: ICD-10-CM

## 2022-06-27 RX ORDER — SIMVASTATIN 20 MG
20 TABLET ORAL
Qty: 90 TABLET | Refills: 1 | Status: SHIPPED | OUTPATIENT
Start: 2022-06-27 | End: 2022-08-29 | Stop reason: SDUPTHER

## 2022-06-27 RX ORDER — LISINOPRIL 20 MG/1
20 TABLET ORAL DAILY
Qty: 90 TABLET | Refills: 1 | Status: SHIPPED | OUTPATIENT
Start: 2022-06-27 | End: 2022-08-29

## 2022-06-27 RX ORDER — TRIAMCINOLONE ACETONIDE 1 MG/G
CREAM TOPICAL 2 TIMES DAILY
Qty: 80 G | Refills: 6 | Status: SHIPPED | OUTPATIENT
Start: 2022-06-27

## 2022-06-27 NOTE — TELEPHONE ENCOUNTER
Incoming Refill Request      Medication requested (name and dose):   lisinopril (PRINIVIL,ZESTRIL) 20 MG tablet  simvastatin (ZOCOR) 20 MG tablet  triamcinolone (KENALOG) 0.1 % cream    Pharmacy where request should be sent:   [facility]    Additional details provided by patient:   90 DAY SUPPLY  TRIMCINOLON CREAM GETS 4 REFILLS    Best call back number: [phone]    Does the patient have less than a 3 day supply:  [x] Yes  [] No    Sukh Dowling Rep  06/27/22, 08:27 CDT

## 2022-08-29 ENCOUNTER — OFFICE VISIT (OUTPATIENT)
Dept: FAMILY MEDICINE CLINIC | Facility: CLINIC | Age: 87
End: 2022-08-29

## 2022-08-29 VITALS
HEIGHT: 67 IN | DIASTOLIC BLOOD PRESSURE: 90 MMHG | HEART RATE: 75 BPM | BODY MASS INDEX: 23.39 KG/M2 | SYSTOLIC BLOOD PRESSURE: 170 MMHG | WEIGHT: 149 LBS | OXYGEN SATURATION: 98 %

## 2022-08-29 DIAGNOSIS — R21 RASH: ICD-10-CM

## 2022-08-29 DIAGNOSIS — I10 ESSENTIAL HYPERTENSION: Primary | ICD-10-CM

## 2022-08-29 DIAGNOSIS — E78.2 MIXED HYPERLIPIDEMIA: ICD-10-CM

## 2022-08-29 PROCEDURE — 99213 OFFICE O/P EST LOW 20 MIN: CPT | Performed by: GENERAL PRACTICE

## 2022-08-29 RX ORDER — CLOTRIMAZOLE AND BETAMETHASONE DIPROPIONATE 10; .64 MG/G; MG/G
1 CREAM TOPICAL 2 TIMES DAILY
Qty: 45 G | Refills: 2 | Status: SHIPPED | OUTPATIENT
Start: 2022-08-29 | End: 2022-11-28 | Stop reason: SDUPTHER

## 2022-08-29 RX ORDER — LISINOPRIL 40 MG/1
40 TABLET ORAL DAILY
Qty: 90 TABLET | Refills: 1 | Status: SHIPPED | OUTPATIENT
Start: 2022-08-29 | End: 2022-11-28 | Stop reason: SDUPTHER

## 2022-08-29 RX ORDER — SIMVASTATIN 20 MG
20 TABLET ORAL
Qty: 90 TABLET | Refills: 1 | Status: SHIPPED | OUTPATIENT
Start: 2022-08-29 | End: 2023-03-02 | Stop reason: SDUPTHER

## 2022-08-29 NOTE — PROGRESS NOTES
Subjective   Maciej Funk is a 87 y.o. male.   Chief Complaint   Patient presents with   • Hypertension     For review and evaluation of management of chronic medical problems. Records reviewed. Any recent labs, xrays reviewed and medications reconciled.   Hypertension  This is a chronic problem. The current episode started more than 1 year ago. The problem is unchanged. The problem is controlled. Pertinent negatives include no headaches, neck pain or palpitations. There are no associated agents to hypertension. Risk factors for coronary artery disease include dyslipidemia and smoking/tobacco exposure. Current antihypertension treatment includes ACE inhibitors. The current treatment provides significant improvement. There are no compliance problems.    COPD  There is no cough or wheezing. This is a chronic problem. The current episode started more than 1 year ago. The problem occurs constantly. The problem has been unchanged. Pertinent negatives include no ear pain, fever, headaches, rhinorrhea, sneezing or sore throat. His symptoms are aggravated by smoking.   Rash  This is a chronic problem. The current episode started more than 1 year ago. The problem is unchanged. The affected locations include the left arm. The rash is characterized by redness and scaling. He was exposed to nothing. Pertinent negatives include no cough, fever, rhinorrhea or sore throat. Past treatments include topical steroids. The treatment provided moderate relief.      The following portions of the patient's history were reviewed and updated as appropriate: allergies, current medications, past social history and problem list.    Outpatient Medications Prior to Visit   Medication Sig Dispense Refill   • aspirin 81 MG chewable tablet Chew 81 mg Daily.     • latanoprost (XALATAN) 0.005 % ophthalmic solution Administer 1 drop into the left eye Every Night.     • Multiple Vitamins-Minerals (VISION-HECTOR PRESERVE PO) Take 1 capsule by mouth  "2 (Two) Times a Day. PreserVision Lutein 226 mg-200 unit-5 mg-0.8 mg capsule     • triamcinolone (KENALOG) 0.1 % cream Apply  topically to the appropriate area as directed 2 (Two) Times a Day. Sparingly to rash 80 g 6   • lisinopril (PRINIVIL,ZESTRIL) 20 MG tablet Take 1 tablet by mouth Daily. 90 tablet 1   • simvastatin (ZOCOR) 20 MG tablet Take 1 tablet by mouth every night at bedtime. 90 tablet 1     No facility-administered medications prior to visit.       Review of Systems   Constitutional: Negative for fever.   HENT: Negative for ear pain, rhinorrhea, sneezing and sore throat.    Respiratory: Negative for cough and wheezing.    Cardiovascular: Negative for palpitations.   Musculoskeletal: Negative for neck pain.   Skin: Positive for rash.   Neurological: Negative for headaches.     I have reviewed 12 systems with patient. Findings were negative except what is noted below and/or in history of present illness.     Objective   Visit Vitals  /90   Pulse 75   Ht 170.2 cm (67\")   Wt 67.6 kg (149 lb)   SpO2 98%   BMI 23.34 kg/m²     Physical Exam  Vitals and nursing note reviewed.   Constitutional:       General: He is not in acute distress.     Appearance: He is well-developed.   HENT:      Head: Normocephalic and atraumatic.      Nose: Nose normal.   Eyes:      General:         Right eye: No discharge.         Left eye: No discharge.      Conjunctiva/sclera: Conjunctivae normal.      Pupils: Pupils are equal, round, and reactive to light.   Neck:      Thyroid: No thyromegaly.   Cardiovascular:      Rate and Rhythm: Normal rate and regular rhythm.      Heart sounds: Normal heart sounds. No murmur heard.  Pulmonary:      Effort: Pulmonary effort is normal. No respiratory distress.      Breath sounds: Normal breath sounds. No wheezing or rales.   Chest:      Chest wall: No tenderness.   Abdominal:      General: Bowel sounds are normal. There is no distension.      Palpations: Abdomen is soft. There is no mass. "      Tenderness: There is no abdominal tenderness.      Hernia: No hernia is present.   Musculoskeletal:         General: No deformity. Normal range of motion.      Cervical back: Normal range of motion.   Lymphadenopathy:      Cervical: No cervical adenopathy.   Skin:     General: Skin is warm and dry.      Coloration: Skin is not pale.      Findings: Rash present.          Neurological:      Mental Status: He is alert and oriented to person, place, and time.      Deep Tendon Reflexes: Reflexes are normal and symmetric.   Psychiatric:         Behavior: Behavior normal.         Thought Content: Thought content normal.         Judgment: Judgment normal.         Notes brought forward are reviewed and updated if indicated.     Assessment & Plan   Problems Addressed this Visit        Cardiac and Vasculature    Essential hypertension - Primary (Chronic)    Relevant Medications    lisinopril (PRINIVIL,ZESTRIL) 40 MG tablet    Hyperlipidemia (Chronic)    Relevant Medications    simvastatin (ZOCOR) 20 MG tablet      Other Visit Diagnoses     Rash        Relevant Medications    clotrimazole-betamethasone (Lotrisone) 1-0.05 % cream      Diagnoses       Codes Comments    Essential hypertension    -  Primary ICD-10-CM: I10  ICD-9-CM: 401.9     Mixed hyperlipidemia     ICD-10-CM: E78.2  ICD-9-CM: 272.2     Rash     ICD-10-CM: R21  ICD-9-CM: 782.1          Increase lisinopril. Continue current medications. Recheck if not improving.       New Medications Ordered This Visit   Medications   • clotrimazole-betamethasone (Lotrisone) 1-0.05 % cream     Sig: Apply 1 application topically to the appropriate area as directed 2 (Two) Times a Day.     Dispense:  45 g     Refill:  2   • lisinopril (PRINIVIL,ZESTRIL) 40 MG tablet     Sig: Take 1 tablet by mouth Daily.     Dispense:  90 tablet     Refill:  1     Dosage change   • simvastatin (ZOCOR) 20 MG tablet     Sig: Take 1 tablet by mouth every night at bedtime.     Dispense:  90 tablet      Refill:  1     Return in about 3 months (around 11/29/2022) for Recheck.        This document has been electronically signed by Debbi Jackson MD on August 29, 2022 15:54 CDT

## 2022-11-28 ENCOUNTER — OFFICE VISIT (OUTPATIENT)
Dept: FAMILY MEDICINE CLINIC | Facility: CLINIC | Age: 87
End: 2022-11-28

## 2022-11-28 VITALS
HEART RATE: 76 BPM | DIASTOLIC BLOOD PRESSURE: 72 MMHG | BODY MASS INDEX: 24.01 KG/M2 | HEIGHT: 67 IN | SYSTOLIC BLOOD PRESSURE: 136 MMHG | WEIGHT: 153 LBS | OXYGEN SATURATION: 97 %

## 2022-11-28 DIAGNOSIS — R21 RASH: ICD-10-CM

## 2022-11-28 DIAGNOSIS — I10 ESSENTIAL HYPERTENSION: Primary | Chronic | ICD-10-CM

## 2022-11-28 PROCEDURE — 99213 OFFICE O/P EST LOW 20 MIN: CPT | Performed by: GENERAL PRACTICE

## 2022-11-28 RX ORDER — LISINOPRIL 40 MG/1
40 TABLET ORAL DAILY
Qty: 90 TABLET | Refills: 1 | Status: SHIPPED | OUTPATIENT
Start: 2022-11-28 | End: 2023-03-02 | Stop reason: SDUPTHER

## 2022-11-28 RX ORDER — CLOTRIMAZOLE AND BETAMETHASONE DIPROPIONATE 10; .64 MG/G; MG/G
1 CREAM TOPICAL 2 TIMES DAILY
Qty: 45 G | Refills: 2 | Status: SHIPPED | OUTPATIENT
Start: 2022-11-28 | End: 2023-03-21

## 2022-11-28 NOTE — PROGRESS NOTES
Subjective   Maciej Funk is a 87 y.o. male.   Chief Complaint   Patient presents with   • Hypertension     For review and evaluation of management of chronic medical problems. Records reviewed. Any recent labs, xrays reviewed and medications reconciled.   Hypertension  This is a chronic problem. The current episode started more than 1 year ago. The problem is unchanged. The problem is controlled. Pertinent negatives include no neck pain or palpitations. There are no associated agents to hypertension. Risk factors for coronary artery disease include dyslipidemia and smoking/tobacco exposure. Current antihypertension treatment includes ACE inhibitors. The current treatment provides significant improvement. There are no compliance problems.    Rash  This is a chronic problem. The current episode started more than 1 year ago. The problem is unchanged. The affected locations include the left arm. The rash is characterized by redness and scaling. He was exposed to nothing. Past treatments include topical steroids. The treatment provided moderate relief.      The following portions of the patient's history were reviewed and updated as appropriate: allergies, current medications, past social history and problem list.    Outpatient Medications Prior to Visit   Medication Sig Dispense Refill   • aspirin 81 MG chewable tablet Chew 81 mg Daily.     • latanoprost (XALATAN) 0.005 % ophthalmic solution Administer 1 drop into the left eye Every Night.     • Multiple Vitamins-Minerals (VISION-HECTOR PRESERVE PO) Take 1 capsule by mouth 2 (Two) Times a Day. PreserVision Lutein 226 mg-200 unit-5 mg-0.8 mg capsule     • simvastatin (ZOCOR) 20 MG tablet Take 1 tablet by mouth every night at bedtime. 90 tablet 1   • triamcinolone (KENALOG) 0.1 % cream Apply  topically to the appropriate area as directed 2 (Two) Times a Day. Sparingly to rash 80 g 6   • clotrimazole-betamethasone (Lotrisone) 1-0.05 % cream Apply 1 application  "topically to the appropriate area as directed 2 (Two) Times a Day. 45 g 2   • lisinopril (PRINIVIL,ZESTRIL) 40 MG tablet Take 1 tablet by mouth Daily. 90 tablet 1     No facility-administered medications prior to visit.       Review of Systems   Cardiovascular: Negative for palpitations.   Musculoskeletal: Negative for neck pain.   Skin: Positive for rash.     I have reviewed 12 systems with patient. Findings were negative except what is noted below and/or in history of present illness.     Objective   Visit Vitals  /72   Pulse 76   Ht 170.2 cm (67\")   Wt 69.4 kg (153 lb)   SpO2 97%   BMI 23.96 kg/m²     Physical Exam  Vitals and nursing note reviewed.   Constitutional:       General: He is not in acute distress.     Appearance: He is well-developed.   HENT:      Head: Normocephalic.      Nose: Nose normal.   Eyes:      General:         Right eye: No discharge.         Left eye: No discharge.      Conjunctiva/sclera: Conjunctivae normal.      Pupils: Pupils are equal, round, and reactive to light.   Neck:      Thyroid: No thyromegaly.   Cardiovascular:      Rate and Rhythm: Normal rate and regular rhythm.      Heart sounds: Normal heart sounds. No murmur heard.  Pulmonary:      Effort: Pulmonary effort is normal.      Breath sounds: Normal breath sounds.   Lymphadenopathy:      Cervical: No cervical adenopathy.   Skin:     General: Skin is warm and dry.   Neurological:      Mental Status: He is alert and oriented to person, place, and time.     Notes brought forward are reviewed and updated if indicated.     Assessment & Plan   Problems Addressed this Visit        Cardiac and Vasculature    Essential hypertension - Primary (Chronic)    Relevant Medications    lisinopril (PRINIVIL,ZESTRIL) 40 MG tablet    Other Relevant Orders    CBC & Differential    Comprehensive Metabolic Panel    Lipid Panel   Other Visit Diagnoses     Rash        Relevant Medications    clotrimazole-betamethasone (Lotrisone) 1-0.05 % cream "      Diagnoses       Codes Comments    Essential hypertension    -  Primary ICD-10-CM: I10  ICD-9-CM: 401.9     Rash     ICD-10-CM: R21  ICD-9-CM: 782.1           Continue current medications.     New Medications Ordered This Visit   Medications   • clotrimazole-betamethasone (Lotrisone) 1-0.05 % cream     Sig: Apply 1 application topically to the appropriate area as directed 2 (Two) Times a Day.     Dispense:  45 g     Refill:  2   • lisinopril (PRINIVIL,ZESTRIL) 40 MG tablet     Sig: Take 1 tablet by mouth Daily.     Dispense:  90 tablet     Refill:  1     Return in about 3 months (around 3/1/2023) for medicare wellness visit, Annual physical.        This document has been electronically signed by Debbi Jackson MD on November 28, 2022 14:38 CST

## 2023-02-22 ENCOUNTER — LAB (OUTPATIENT)
Dept: LAB | Facility: HOSPITAL | Age: 88
End: 2023-02-22
Payer: MEDICARE

## 2023-02-22 DIAGNOSIS — I10 ESSENTIAL HYPERTENSION: ICD-10-CM

## 2023-02-22 PROCEDURE — 36415 COLL VENOUS BLD VENIPUNCTURE: CPT

## 2023-02-22 PROCEDURE — 80053 COMPREHEN METABOLIC PANEL: CPT

## 2023-02-22 PROCEDURE — 85025 COMPLETE CBC W/AUTO DIFF WBC: CPT

## 2023-02-22 PROCEDURE — 80061 LIPID PANEL: CPT

## 2023-02-23 LAB
ALBUMIN SERPL-MCNC: 3.4 G/DL (ref 3.5–5.2)
ALBUMIN/GLOB SERPL: 1.1 G/DL
ALP SERPL-CCNC: 77 U/L (ref 39–117)
ALT SERPL W P-5'-P-CCNC: 9 U/L (ref 1–41)
ANION GAP SERPL CALCULATED.3IONS-SCNC: 10.9 MMOL/L (ref 5–15)
AST SERPL-CCNC: 15 U/L (ref 1–40)
BASOPHILS # BLD AUTO: 0.04 10*3/MM3 (ref 0–0.2)
BASOPHILS NFR BLD AUTO: 0.7 % (ref 0–1.5)
BILIRUB SERPL-MCNC: 1.1 MG/DL (ref 0–1.2)
BUN SERPL-MCNC: 15 MG/DL (ref 8–23)
BUN/CREAT SERPL: 14.6 (ref 7–25)
CALCIUM SPEC-SCNC: 8.7 MG/DL (ref 8.6–10.5)
CHLORIDE SERPL-SCNC: 101 MMOL/L (ref 98–107)
CHOLEST SERPL-MCNC: 131 MG/DL (ref 0–200)
CO2 SERPL-SCNC: 26.1 MMOL/L (ref 22–29)
CREAT SERPL-MCNC: 1.03 MG/DL (ref 0.76–1.27)
DEPRECATED RDW RBC AUTO: 42.9 FL (ref 37–54)
EGFRCR SERPLBLD CKD-EPI 2021: 71.2 ML/MIN/1.73
EOSINOPHIL # BLD AUTO: 0.1 10*3/MM3 (ref 0–0.4)
EOSINOPHIL NFR BLD AUTO: 1.7 % (ref 0.3–6.2)
ERYTHROCYTE [DISTWIDTH] IN BLOOD BY AUTOMATED COUNT: 12.4 % (ref 12.3–15.4)
GLOBULIN UR ELPH-MCNC: 3.2 GM/DL
GLUCOSE SERPL-MCNC: 80 MG/DL (ref 65–99)
HCT VFR BLD AUTO: 35.6 % (ref 37.5–51)
HDLC SERPL-MCNC: 47 MG/DL (ref 40–60)
HGB BLD-MCNC: 11.8 G/DL (ref 13–17.7)
IMM GRANULOCYTES # BLD AUTO: 0.01 10*3/MM3 (ref 0–0.05)
IMM GRANULOCYTES NFR BLD AUTO: 0.2 % (ref 0–0.5)
LDLC SERPL CALC-MCNC: 72 MG/DL (ref 0–100)
LDLC/HDLC SERPL: 1.55 {RATIO}
LYMPHOCYTES # BLD AUTO: 2.26 10*3/MM3 (ref 0.7–3.1)
LYMPHOCYTES NFR BLD AUTO: 37.4 % (ref 19.6–45.3)
MCH RBC QN AUTO: 31 PG (ref 26.6–33)
MCHC RBC AUTO-ENTMCNC: 33.1 G/DL (ref 31.5–35.7)
MCV RBC AUTO: 93.4 FL (ref 79–97)
MONOCYTES # BLD AUTO: 0.37 10*3/MM3 (ref 0.1–0.9)
MONOCYTES NFR BLD AUTO: 6.1 % (ref 5–12)
NEUTROPHILS NFR BLD AUTO: 3.27 10*3/MM3 (ref 1.7–7)
NEUTROPHILS NFR BLD AUTO: 53.9 % (ref 42.7–76)
NRBC BLD AUTO-RTO: 0 /100 WBC (ref 0–0.2)
PLATELET # BLD AUTO: 220 10*3/MM3 (ref 140–450)
PMV BLD AUTO: 10 FL (ref 6–12)
POTASSIUM SERPL-SCNC: 4.6 MMOL/L (ref 3.5–5.2)
PROT SERPL-MCNC: 6.6 G/DL (ref 6–8.5)
RBC # BLD AUTO: 3.81 10*6/MM3 (ref 4.14–5.8)
SODIUM SERPL-SCNC: 138 MMOL/L (ref 136–145)
TRIGL SERPL-MCNC: 55 MG/DL (ref 0–150)
VLDLC SERPL-MCNC: 12 MG/DL (ref 5–40)
WBC NRBC COR # BLD: 6.05 10*3/MM3 (ref 3.4–10.8)

## 2023-03-02 ENCOUNTER — OFFICE VISIT (OUTPATIENT)
Dept: FAMILY MEDICINE CLINIC | Facility: CLINIC | Age: 88
End: 2023-03-02
Payer: COMMERCIAL

## 2023-03-02 VITALS
HEIGHT: 67 IN | SYSTOLIC BLOOD PRESSURE: 130 MMHG | DIASTOLIC BLOOD PRESSURE: 70 MMHG | OXYGEN SATURATION: 98 % | WEIGHT: 152.8 LBS | BODY MASS INDEX: 23.98 KG/M2

## 2023-03-02 DIAGNOSIS — I10 ESSENTIAL HYPERTENSION: Chronic | ICD-10-CM

## 2023-03-02 DIAGNOSIS — Z00.00 MEDICARE ANNUAL WELLNESS VISIT, SUBSEQUENT: Primary | ICD-10-CM

## 2023-03-02 DIAGNOSIS — E78.2 MIXED HYPERLIPIDEMIA: ICD-10-CM

## 2023-03-02 DIAGNOSIS — G89.29 CHRONIC MIDLINE LOW BACK PAIN WITHOUT SCIATICA: ICD-10-CM

## 2023-03-02 DIAGNOSIS — M54.50 CHRONIC MIDLINE LOW BACK PAIN WITHOUT SCIATICA: ICD-10-CM

## 2023-03-02 PROCEDURE — G0439 PPPS, SUBSEQ VISIT: HCPCS | Performed by: GENERAL PRACTICE

## 2023-03-02 RX ORDER — SIMVASTATIN 20 MG
20 TABLET ORAL
Qty: 90 TABLET | Refills: 1 | Status: SHIPPED | OUTPATIENT
Start: 2023-03-02

## 2023-03-02 RX ORDER — HYDROCODONE BITARTRATE AND ACETAMINOPHEN 7.5; 325 MG/1; MG/1
1 TABLET ORAL EVERY 6 HOURS PRN
Qty: 40 TABLET | Refills: 0 | Status: SHIPPED | OUTPATIENT
Start: 2023-03-02

## 2023-03-02 RX ORDER — LISINOPRIL 40 MG/1
40 TABLET ORAL DAILY
Qty: 90 TABLET | Refills: 1 | Status: SHIPPED | OUTPATIENT
Start: 2023-03-02

## 2023-03-02 RX ORDER — HYDROCODONE BITARTRATE AND ACETAMINOPHEN 7.5; 325 MG/1; MG/1
1 TABLET ORAL EVERY 6 HOURS PRN
COMMUNITY
End: 2023-03-02 | Stop reason: SDUPTHER

## 2023-03-02 NOTE — PATIENT INSTRUCTIONS
Get new Covid booster    Medicare Wellness  Personal Prevention Plan of Service     Date of Office Visit:    Encounter Provider:  Debbi Jackson MD  Place of Service:  [facility]  Patient Name: Maciej Funk  :  1935    As part of the Medicare Wellness portion of your visit today, we are providing you with this personalized preventive plan of services (PPPS). This plan is based upon recommendations of the [place] Preventive Services Task Force (USPSTF) and the Advisory Committee on Immunization Practices (ACIP).    This lists the preventive care services that should be considered, and provides dates of when you are due. Items listed as completed are up-to-date and do not require any further intervention.    Health Maintenance   Topic Date Due    TDAP/TD VACCINES (1 - Tdap) Never done    ZOSTER VACCINE (2 of 2) 2017    COLORECTAL CANCER SCREENING  10/27/2020    COVID-19 Vaccine (4 - Booster for Pfizer series) 2021    INFLUENZA VACCINE  2022    ANNUAL WELLNESS VISIT  2023    LIPID PANEL  2024    Pneumococcal Vaccine 65+  Completed       No orders of the defined types were placed in this encounter.      No follow-ups on file.

## 2023-03-02 NOTE — PROGRESS NOTES
The ABCs of the Annual Wellness Visit  Subsequent Medicare Wellness Visit    Chief Complaint   Patient presents with   • Annual Exam   • Medicare Wellness-subsequent      Subjective    History of Present Illness:  Maciej Funk is a 87 y.o. male who presents for a Subsequent Medicare Wellness Visit.    The following portions of the patient's history were reviewed and   updated as appropriate: allergies, current medications, past family history, past medical history, past social history, past surgical history and problem list.    Compared to one year ago, the patient feels his physical   health is the same.    Compared to one year ago, the patient feels his mental   health is the same.    Recent Hospitalizations:  He was not admitted to the hospital during the last year.       Current Medical Providers:  Patient Care Team:  Debbi Jackson MD as PCP - General (Family Medicine)    Outpatient Medications Prior to Visit   Medication Sig Dispense Refill   • aspirin 81 MG chewable tablet Chew 1 tablet Daily.     • clotrimazole-betamethasone (Lotrisone) 1-0.05 % cream Apply 1 application topically to the appropriate area as directed 2 (Two) Times a Day. 45 g 2   • latanoprost (XALATAN) 0.005 % ophthalmic solution Administer 1 drop into the left eye Every Night.     • Multiple Vitamins-Minerals (VISION-HECTOR PRESERVE PO) Take 1 tablet by mouth 2 (Two) Times a Day. PreserVision Lutein 226 mg-200 unit-5 mg-0.8 mg capsule     • triamcinolone (KENALOG) 0.1 % cream Apply  topically to the appropriate area as directed 2 (Two) Times a Day. Sparingly to rash 80 g 6   • HYDROcodone-acetaminophen (NORCO) 7.5-325 MG per tablet Take 1 tablet by mouth Every 6 (Six) Hours As Needed for Moderate Pain.     • lisinopril (PRINIVIL,ZESTRIL) 40 MG tablet Take 1 tablet by mouth Daily. 90 tablet 1   • simvastatin (ZOCOR) 20 MG tablet Take 1 tablet by mouth every night at bedtime. 90 tablet 1     No facility-administered medications  "prior to visit.       Opioid medication/s are on active medication list.  and I have evaluated his active treatment plan and pain score trends (see table).  Vitals:    03/02/23 1251   PainSc: 0-No pain     I have reviewed the chart for potential of high risk medication and harmful drug interactions in the elderly.            Aspirin is on active medication list. Aspirin use is indicated based on review of current medical condition/s. Pros and cons of this therapy have been discussed today. Benefits of this medication outweigh potential harm.  Patient has been encouraged to continue taking this medication.  .      Patient Active Problem List   Diagnosis   • Essential hypertension   • History of adenomatous polyp of colon   • Hyperlipidemia   • Tobacco dependence syndrome   • Nicotine dependence, cigarettes, with other nicotine-induced disorders   • Carotid stenosis, bilateral   • Optic atrophy   • Nonexudative age-related macular degeneration   • Nuclear cataract   • Closed displaced intertrochanteric fracture of left femur (HCC)   • Pulmonary hypertension (HCC)   • Emphysema of lung (HCC)   • Atherosclerosis     Advance Care Planning  Advance Directive is on file.  ACP discussion was held with the patient during this visit. Patient has an advance directive in EMR which is still valid.           Objective    Vitals:    03/02/23 1251   BP: 130/70   Pulse: Comment: 51-68   SpO2: 98%   Weight: 69.3 kg (152 lb 12.8 oz)   Height: 170.2 cm (67\")   PainSc: 0-No pain     Estimated body mass index is 23.93 kg/m² as calculated from the following:    Height as of this encounter: 170.2 cm (67\").    Weight as of this encounter: 69.3 kg (152 lb 12.8 oz).    BMI is within normal parameters. No other follow-up for BMI required.      Does the patient have evidence of cognitive impairment? No    Physical Exam  Constitutional:       General: He is not in acute distress.     Appearance: He is well-developed.   HENT:      Head: " Normocephalic and atraumatic.      Nose: Nose normal.   Eyes:      General:         Right eye: No discharge.         Left eye: No discharge.      Conjunctiva/sclera: Conjunctivae normal.      Pupils: Pupils are equal, round, and reactive to light.   Neck:      Thyroid: No thyromegaly.   Cardiovascular:      Rate and Rhythm: Normal rate and regular rhythm.      Heart sounds: Normal heart sounds. No murmur heard.  Pulmonary:      Effort: Pulmonary effort is normal. No respiratory distress.      Breath sounds: Normal breath sounds. No wheezing or rales.   Chest:      Chest wall: No tenderness.   Abdominal:      General: Bowel sounds are normal. There is no distension.      Palpations: Abdomen is soft. There is no mass.      Tenderness: There is no abdominal tenderness.      Hernia: No hernia is present.   Musculoskeletal:         General: No deformity. Normal range of motion.      Cervical back: Normal range of motion.   Lymphadenopathy:      Cervical: No cervical adenopathy.   Skin:     General: Skin is warm and dry.      Coloration: Skin is not pale.      Findings: No rash.   Neurological:      Mental Status: He is alert and oriented to person, place, and time.      Deep Tendon Reflexes: Reflexes are normal and symmetric.   Psychiatric:         Behavior: Behavior normal.         Thought Content: Thought content normal.         Judgment: Judgment normal.       Lab Results   Component Value Date    TRIG 55 02/22/2023    HDL 47 02/22/2023    LDL 72 02/22/2023    VLDL 12 02/22/2023            HEALTH RISK ASSESSMENT    Smoking Status:  Social History     Tobacco Use   Smoking Status Every Day   • Packs/day: 1.00   • Years: 60.00   • Pack years: 60.00   • Types: Cigarettes   Smokeless Tobacco Former     Alcohol Consumption:  Social History     Substance and Sexual Activity   Alcohol Use No     Fall Risk Screen:    STEADI Fall Risk Assessment was completed, and patient is at HIGH risk for falls. Assessment completed  on:3/2/2023    Depression Screening:  PHQ-2/PHQ-9 Depression Screening 3/2/2023   Retired PHQ-9 Total Score -   Retired Total Score -   Little Interest or Pleasure in Doing Things 0-->not at all   Feeling Down, Depressed or Hopeless 0-->not at all   PHQ-9: Brief Depression Severity Measure Score 0       Health Habits and Functional and Cognitive Screening:  Functional & Cognitive Status 3/2/2023   Do you have difficulty preparing food and eating? No   Do you have difficulty bathing yourself, getting dressed or grooming yourself? No   Do you have difficulty using the toilet? No   Do you have difficulty moving around from place to place? No   Do you have trouble with steps or getting out of a bed or a chair? No   Current Diet Well Balanced Diet   Dental Exam Unknown   Eye Exam Up to date   Exercise (times per week) 0 times per week   Current Exercises Include No Regular Exercise   Current Exercise Activities Include -   Do you need help using the phone?  No   Are you deaf or do you have serious difficulty hearing?  Yes   Do you need help with transportation? No   Do you need help shopping? No   Do you need help preparing meals?  No   Do you need help with housework?  No   Do you need help with laundry? No   Do you need help taking your medications? No   Do you need help managing money? No   Do you ever drive or ride in a car without wearing a seat belt? No   Have you felt unusual stress, anger or loneliness in the last month? No   Who do you live with? Spouse   If you need help, do you have trouble finding someone available to you? No   Have you been bothered in the last four weeks by sexual problems? No   Do you have difficulty concentrating, remembering or making decisions? No       Age-appropriate Screening Schedule:  Refer to the list below for future screening recommendations based on patient's age, sex and/or medical conditions. Orders for these recommended tests are listed in the plan section. The patient has  been provided with a written plan.    Health Maintenance   Topic Date Due   • TDAP/TD VACCINES (1 - Tdap) Never done   • ZOSTER VACCINE (2 of 2) 11/30/2017   • COVID-19 Vaccine (4 - Booster for Pfizer series) 12/08/2021   • ANNUAL WELLNESS VISIT  02/28/2023   • INFLUENZA VACCINE  03/31/2023 (Originally 8/1/2022)   • LIPID PANEL  02/22/2024   • Pneumococcal Vaccine 65+  Completed   • COLORECTAL CANCER SCREENING  Discontinued              Assessment & Plan   CMS Preventative Services Quick Reference  Risk Factors Identified During Encounter  Fall Risk-High or Moderate: Discussed Fall Prevention in the home  Immunizations Discussed/Encouraged: Td, Shingrix and COVID19  Tobacco Use/Dependance Risk I advised the patient of the risks in continuing to use tobacco.  I also discussed how to quit smoking and the patient has not expressed the willingness to quit.    The above risks/problems have been discussed with the patient.  Follow up actions/plans if indicated are seen below in the Assessment/Plan Section.  Pertinent information has been shared with the patient in the After Visit Summary.    Diagnoses and all orders for this visit:    1. Medicare annual wellness visit, subsequent (Primary)    2. Essential hypertension  -     lisinopril (PRINIVIL,ZESTRIL) 40 MG tablet; Take 1 tablet by mouth Daily.  Dispense: 90 tablet; Refill: 1    3. Mixed hyperlipidemia  -     simvastatin (ZOCOR) 20 MG tablet; Take 1 tablet by mouth every night at bedtime.  Dispense: 90 tablet; Refill: 1    4. Chronic midline low back pain without sciatica  -     HYDROcodone-acetaminophen (NORCO) 7.5-325 MG per tablet; Take 1 tablet by mouth Every 6 (Six) Hours As Needed for Moderate Pain.  Dispense: 40 tablet; Refill: 0        Follow Up:   Return in about 6 months (around 9/2/2023) for Recheck.     An After Visit Summary and PPPS were made available to the patient.  Information has been scanned into chart. Discussed importance of taking medications as  prescribed. Encouraged healthy eating habits with low fat, low salt choices and working towards maintaining a healthy weight. Recommended regular exercise if able as well as care to prevent falls including avoiding anything on the floor that they could slip or trip on such as throw rugs, making sure they have a bathmat to step onto when their feet are wet and having grab bars and railings where needed.

## 2023-03-21 DIAGNOSIS — R21 RASH: ICD-10-CM

## 2023-03-21 RX ORDER — CLOTRIMAZOLE AND BETAMETHASONE DIPROPIONATE 10; .64 MG/G; MG/G
CREAM TOPICAL
Qty: 45 G | Refills: 1 | Status: SHIPPED | OUTPATIENT
Start: 2023-03-21

## 2023-03-22 ENCOUNTER — OFFICE VISIT (OUTPATIENT)
Dept: CARDIAC SURGERY | Facility: CLINIC | Age: 88
End: 2023-03-22
Payer: COMMERCIAL

## 2023-03-22 VITALS
SYSTOLIC BLOOD PRESSURE: 134 MMHG | HEART RATE: 75 BPM | WEIGHT: 152 LBS | BODY MASS INDEX: 23.86 KG/M2 | OXYGEN SATURATION: 98 % | HEIGHT: 67 IN | DIASTOLIC BLOOD PRESSURE: 77 MMHG

## 2023-03-22 DIAGNOSIS — E78.2 MIXED HYPERLIPIDEMIA: ICD-10-CM

## 2023-03-22 DIAGNOSIS — I10 ESSENTIAL HYPERTENSION: ICD-10-CM

## 2023-03-22 DIAGNOSIS — F17.200 TOBACCO DEPENDENCE SYNDROME: ICD-10-CM

## 2023-03-22 DIAGNOSIS — I65.23 CAROTID STENOSIS, BILATERAL: Primary | ICD-10-CM

## 2023-03-22 PROCEDURE — 99214 OFFICE O/P EST MOD 30 MIN: CPT | Performed by: NURSE PRACTITIONER

## 2023-03-22 NOTE — PROGRESS NOTES
CVTS Office Progress Note       Subjective   Patient ID: Maciej Funk is a 88 y.o. male is here today for follow-up.    Chief Complaint:    Chief Complaint   Patient presents with   • Carotid Artery Disease       The following portions of the patient's history were reviewed and updated as appropriate: allergies, current medications, past family history, past medical history, past social history, past surgical history and problem list.  Recent images independently reviewed.  Available laboratory values reviewed.    PCP:  Debbi Jackson MD    85 y.o. male with HTN, AAA, COPD, dyslipidemia, carotid stensosi.  smokes 1 PPD.  Bilateral carotid bruits noted on exam.  Unable to see out of LEFT eye x years.  Mild dizziness occasional x years.  Activity level good, mows yard, rakes leaves.  Plavix started for severe carotid stenosis on duplex imaging.  Recovering well from CEA.  No other associated symptoms or modifying factors.     11/9/2016 Carotid Duplex:  AISHA 50-79% (127cm/s), antegrade vert.  LICA 80-99% (522cm/s), antegrade vert.  12/9/2016 CTA Carotids:  AISHA 50%, LICA 95%  1/4/2017 LEFT CEA  2/27/2017 Carotid Duplex:  LICA normal (77cm/s), antegrade vert.  11/20/2017 Carotid Duplex:  AISHA 50-69% (99cm/s), antegrade vert.  LICA 0-49% (87cm/s), antegrade vert.  1/2019: Carotid Duplex: AISHA 0-49% (102/1.9) antegrade. LICA 0-49% (81/2.5) antegrade  2/11/2020: Carotid Duplex: RIGHT 0-49% (91/1.5) LEFT 0-49% (90/1.4) Antegrade  2/24/2021:  Carotid Duplex: RIGHT 0-49% (87/1.7) LEFT 0-49% (102/1.6) Antegrade   3/2022: Carotid Duplex: RIGHT 0-49% (120/31cm/s, ratio 1.5) LEFT 0-49% (96/34cm/s, ratio 1.7) Antegrade   3/2023: Carotid Duplex: RIGHT 50-69% (157/38cm/s, ratio 2.4) LEFT 0-49% (94/36cm/s, ratio 1.9) Antegrade       Past Medical History:   Diagnosis Date   • Abdominal aortic aneurysm    • Carotid stenosis, bilateral    • Cataract    • Emphysema of lung (HCC)    • Herpes zoster    • Hyperlipidemia    •  Hypertension      Past Surgical History:   Procedure Laterality Date   • CARDIAC CATHETERIZATION     • CATARACT EXTRACTION W/ INTRAOCULAR LENS IMPLANT Left 2/2/2018   • CATARACT EXTRACTION W/ INTRAOCULAR LENS IMPLANT Right 2/9/2018   • COLONOSCOPY     • COLONOSCOPY W/ POLYPECTOMY     • FEMUR IM NAILING/RODDING Left 11/21/2018    Procedure: FEMUR INTRAMEDULLARY NAILING/RODDING;  Surgeon: Wu Hare MD;  Location: [facility];  Service: Orthopedics   • HAND SURGERY       Family History   Problem Relation Age of Onset   • Breast cancer Mother    • Cancer Mother    • Hypertension Father    • Kidney disease Father    • Kidney disease Brother      Social History     Tobacco Use   • Smoking status: Every Day     Packs/day: 1.00     Years: 60.00     Pack years: 60.00     Types: Cigarettes   • Smokeless tobacco: Former   Substance Use Topics   • Alcohol use: No   • Drug use: No       ALLERGIES:   Patient has no known allergies.    MEDICATIONS:      Current Outpatient Medications:   •  aspirin 81 MG chewable tablet, Chew 1 tablet Daily., Disp: , Rfl:   •  clotrimazole-betamethasone (LOTRISONE) 1-0.05 % cream, APPLY TO AFFECTED AREA(S) AS DIRECTED TWICE DAILY, Disp: 45 g, Rfl: 1  •  HYDROcodone-acetaminophen (NORCO) 7.5-325 MG per tablet, Take 1 tablet by mouth Every 6 (Six) Hours As Needed for Moderate Pain., Disp: 40 tablet, Rfl: 0  •  latanoprost (XALATAN) 0.005 % ophthalmic solution, Administer 1 drop into the left eye Every Night., Disp: , Rfl:   •  lisinopril (PRINIVIL,ZESTRIL) 40 MG tablet, Take 1 tablet by mouth Daily., Disp: 90 tablet, Rfl: 1  •  Multiple Vitamins-Minerals (VISION-HECTOR PRESERVE PO), Take 1 tablet by mouth 2 (Two) Times a Day. PreserVision Lutein 226 mg-200 unit-5 mg-0.8 mg capsule, Disp: , Rfl:   •  simvastatin (ZOCOR) 20 MG tablet, Take 1 tablet by mouth every night at bedtime., Disp: 90 tablet, Rfl: 1  •  triamcinolone (KENALOG) 0.1 % cream, Apply  topically to the appropriate area as  directed 2 (Two) Times a Day. Sparingly to rash, Disp: 80 g, Rfl: 6    Review of Systems   HENT: Positive for hearing loss.    Eyes: Negative for visual disturbance.   Cardiovascular: Negative for claudication and cyanosis.   Respiratory: Negative for shortness of breath.    Skin: Negative for color change and nail changes.   Musculoskeletal: Positive for arthritis. Negative for muscle weakness.   Gastrointestinal: Negative for dysphagia.   Neurological: Negative for focal weakness, light-headedness, loss of balance, numbness, paresthesias and weakness.   Psychiatric/Behavioral: Negative for altered mental status.   All other systems reviewed and are negative.    No changes in ROS 03/24/23     Objective   Heart Rate:  [75] 75  BP: (134)/(77) 134/77   Vitals:    03/22/23 1328   BP: 134/77   Pulse: 75   SpO2: 98%      Body mass index is 23.81 kg/m².  Physical Exam   Constitutional: He is oriented to person, place, and time. He appears well-developed.   HENT:   Head: Normocephalic.   Neck: Carotid bruit is not present.   Cardiovascular: Normal rate and normal heart sounds.   Pulses:       Dorsalis pedis pulses are 1+ on the right side and 1+ on the left side.        Posterior tibial pulses are 1+ on the right side and 1+ on the left side.   Pulmonary/Chest: Effort normal and breath sounds normal.   Abdominal: Soft. Bowel sounds are normal.   Musculoskeletal: Normal range of motion.   Neurological: He is alert and oriented to person, place, and time. Gait normal.   Skin: Skin is warm and dry. Capillary refill takes less than 2 seconds.   No venous staining   Psychiatric: Thought content normal.   Vitals reviewed.    No changes in physical exam 03/24/23      Assessment & Plan     Independent Review of Radiographic Studies:  Detailed discussion regarding risks, benefits, and treatment plan. Images independently reviewed. Patient understands, agrees, and wishes to proceed with plan.     1. Carotid stenosis,  bilateral  moderate Carotid Artery Stenosis right worsened Asymptomatic   Medical Management: ASA,STATIN   If you should experience any neurological symptoms including but not limited to visual or speech disturbances confusion, seizures, or weakness of limbs of one side of your body notify Heart and Vascular center immediately for evaluation or if after hours present to the nearest Emergency Department.    Return 6 mos   - Duplex Carotid Ultrasound CAR; Future    2. Essential hypertension  controlled    3. Mixed hyperlipidemia  Lipid-lowering therapy has been proven beneficial in patients with cardio-vascular disease. Current guidelines recommend statin treatment for all patients with PAD,CAD and carotid stenosis. Statins are beneficial in preventing cardiovascular events, increasing functional capacity and lower the risk of adverse limb loss in PAD. Statins decrease the progression of plaque formation and may improve peripheral vessel lining, and aid in reversing atherosclerosis.       4. Tobacco dependence syndrome  Smoking cessation assistance options offered including behavioral counseling (Smoking Cessation Classes), Nicotine replacement therapy (patches or gum), pharmacologic therapy (Chantix, Wellbutrin). Understands tobacco increases risk of expanding AAA, MI, CVA, PAD, carcinoma. Discussion and question answer period 5-7 minutes. Maciej Bayrondwaine Funk  reports that he has been smoking cigarettes. He has a 60.00 pack-year smoking history. He has quit using smokeless tobacco.. I have educated him on the risk of diseases from using tobacco products such as cancer, COPD and heart disease.     I advised him to quit and he is not willing to quit.    I spent 5 minutes counseling the patient.         Advance Care Planning discussed and  Informational packet given to patient. Advance Care Plan on file: Yes    Time spent 30 minutes in face to face evaluation, reviewing of medical history, tests, and procedures.  Independent interpretation of vascular studies, ordering additional tests, documentation, and coordination of care.   Counseling and education with the patient and family regarding treatment options, plan of care, and hoped for outcomes. All questions answered.           This document has been electronically signed by CHRISTINA Denny on March 22, 2023 13:43 CDT

## 2023-03-22 NOTE — PATIENT INSTRUCTIONS
moderate Carotid Artery Stenosis right worsened Asymptomatic   Medical Management: ASA,STATIN   If you should experience any neurological symptoms including but not limited to visual or speech disturbances confusion, seizures, or weakness of limbs of one side of your body notify Heart and Vascular center immediately for evaluation or if after hours present to the nearest Emergency Department.    Return 6 mos     Smoking cessation advised.  Tobacco increases risk of expanding AAA, MI, CVA, PAD, carcinoma.

## 2023-08-07 DIAGNOSIS — M54.50 CHRONIC MIDLINE LOW BACK PAIN WITHOUT SCIATICA: ICD-10-CM

## 2023-08-07 DIAGNOSIS — G89.29 CHRONIC MIDLINE LOW BACK PAIN WITHOUT SCIATICA: ICD-10-CM

## 2023-08-07 RX ORDER — HYDROCODONE BITARTRATE AND ACETAMINOPHEN 7.5; 325 MG/1; MG/1
1 TABLET ORAL EVERY 6 HOURS PRN
Qty: 40 TABLET | Refills: 0 | Status: CANCELLED | OUTPATIENT
Start: 2023-08-07

## 2023-08-07 NOTE — TELEPHONE ENCOUNTER
Incoming Refill Request      Medication requested (name and dose):HYDROcodone-acetaminophen (NORCO) 7.5-325 MG per tablet      Pharmacy where request should be sent: [facility]    Additional details provided by patient: NONE    Best call back number: [phone]    Does the patient have less than a 3 day supply:  [] Yes  [x] No    Sukh Calle Rep  08/07/23, 11:13 CDT

## 2023-08-08 DIAGNOSIS — G89.29 CHRONIC MIDLINE LOW BACK PAIN WITHOUT SCIATICA: ICD-10-CM

## 2023-08-08 DIAGNOSIS — M54.50 CHRONIC MIDLINE LOW BACK PAIN WITHOUT SCIATICA: ICD-10-CM

## 2023-08-08 RX ORDER — HYDROCODONE BITARTRATE AND ACETAMINOPHEN 7.5; 325 MG/1; MG/1
TABLET ORAL
Qty: 40 TABLET | Refills: 0 | Status: SHIPPED | OUTPATIENT
Start: 2023-08-08

## 2023-08-08 NOTE — TELEPHONE ENCOUNTER
Last Rx 03/02/2023  #40, NR    UPCOMING APPTS  With Family Medicine (Debbi Jackson MD)  09/05/2023 at 1:30 PM  LAST OFFICE VISIT - THIS DEPT  3/2/2023 Debbi Jackson MD

## 2023-09-05 ENCOUNTER — LAB (OUTPATIENT)
Dept: LAB | Facility: HOSPITAL | Age: 88
End: 2023-09-05
Payer: COMMERCIAL

## 2023-09-05 ENCOUNTER — OFFICE VISIT (OUTPATIENT)
Dept: FAMILY MEDICINE CLINIC | Facility: CLINIC | Age: 88
End: 2023-09-05
Payer: COMMERCIAL

## 2023-09-05 VITALS
OXYGEN SATURATION: 97 % | HEIGHT: 67 IN | HEART RATE: 78 BPM | WEIGHT: 150 LBS | DIASTOLIC BLOOD PRESSURE: 72 MMHG | BODY MASS INDEX: 23.54 KG/M2 | SYSTOLIC BLOOD PRESSURE: 120 MMHG

## 2023-09-05 DIAGNOSIS — J43.1 PANLOBULAR EMPHYSEMA: Chronic | ICD-10-CM

## 2023-09-05 DIAGNOSIS — G89.29 CHRONIC MIDLINE LOW BACK PAIN WITHOUT SCIATICA: ICD-10-CM

## 2023-09-05 DIAGNOSIS — E78.2 MIXED HYPERLIPIDEMIA: Chronic | ICD-10-CM

## 2023-09-05 DIAGNOSIS — I10 ESSENTIAL HYPERTENSION: Chronic | ICD-10-CM

## 2023-09-05 DIAGNOSIS — R35.0 BENIGN PROSTATIC HYPERPLASIA WITH URINARY FREQUENCY: ICD-10-CM

## 2023-09-05 DIAGNOSIS — N40.1 BENIGN PROSTATIC HYPERPLASIA WITH URINARY FREQUENCY: ICD-10-CM

## 2023-09-05 DIAGNOSIS — M54.50 CHRONIC MIDLINE LOW BACK PAIN WITHOUT SCIATICA: ICD-10-CM

## 2023-09-05 DIAGNOSIS — R35.0 URINARY FREQUENCY: Primary | ICD-10-CM

## 2023-09-05 LAB
BACTERIA UR QL AUTO: ABNORMAL /HPF
BILIRUB UR QL STRIP: NEGATIVE
CLARITY UR: CLEAR
COLOR UR: YELLOW
GLUCOSE UR STRIP-MCNC: NEGATIVE MG/DL
HGB UR QL STRIP.AUTO: ABNORMAL
HYALINE CASTS UR QL AUTO: ABNORMAL /LPF
KETONES UR QL STRIP: NEGATIVE
LEUKOCYTE ESTERASE UR QL STRIP.AUTO: ABNORMAL
NITRITE UR QL STRIP: NEGATIVE
PH UR STRIP.AUTO: 6 [PH] (ref 5–9)
PROT UR QL STRIP: ABNORMAL
RBC # UR STRIP: ABNORMAL /HPF
REF LAB TEST METHOD: ABNORMAL
SP GR UR STRIP: 1.01 (ref 1–1.03)
SQUAMOUS #/AREA URNS HPF: ABNORMAL /HPF
UROBILINOGEN UR QL STRIP: ABNORMAL
WBC # UR STRIP: ABNORMAL /HPF

## 2023-09-05 PROCEDURE — 80048 BASIC METABOLIC PNL TOTAL CA: CPT | Performed by: GENERAL PRACTICE

## 2023-09-05 PROCEDURE — 87086 URINE CULTURE/COLONY COUNT: CPT | Performed by: GENERAL PRACTICE

## 2023-09-05 PROCEDURE — 99214 OFFICE O/P EST MOD 30 MIN: CPT | Performed by: GENERAL PRACTICE

## 2023-09-05 PROCEDURE — 36415 COLL VENOUS BLD VENIPUNCTURE: CPT | Performed by: GENERAL PRACTICE

## 2023-09-05 PROCEDURE — 81001 URINALYSIS AUTO W/SCOPE: CPT | Performed by: GENERAL PRACTICE

## 2023-09-05 RX ORDER — LATANOPROST 50 UG/ML
1 SOLUTION/ DROPS OPHTHALMIC NIGHTLY
Qty: 7.5 ML | Refills: 5 | Status: SHIPPED | OUTPATIENT
Start: 2023-09-05

## 2023-09-05 RX ORDER — HYDROCODONE BITARTRATE AND ACETAMINOPHEN 7.5; 325 MG/1; MG/1
1 TABLET ORAL EVERY 6 HOURS PRN
Qty: 60 TABLET | Refills: 0 | Status: SHIPPED | OUTPATIENT
Start: 2023-09-05

## 2023-09-05 RX ORDER — SIMVASTATIN 20 MG
20 TABLET ORAL
Qty: 90 TABLET | Refills: 1 | Status: SHIPPED | OUTPATIENT
Start: 2023-09-05

## 2023-09-05 RX ORDER — LISINOPRIL 40 MG/1
40 TABLET ORAL DAILY
Qty: 90 TABLET | Refills: 1 | Status: SHIPPED | OUTPATIENT
Start: 2023-09-05

## 2023-09-05 RX ORDER — TAMSULOSIN HYDROCHLORIDE 0.4 MG/1
1 CAPSULE ORAL NIGHTLY
Qty: 90 CAPSULE | Refills: 3 | Status: SHIPPED | OUTPATIENT
Start: 2023-09-05

## 2023-09-05 NOTE — PROGRESS NOTES
Subjective   Maciej Funk is a 88 y.o. male.   Chief Complaint   Patient presents with    Hypertension    Urinary Frequency     6 month check up      For review and evaluation of management of chronic medical problems. Records reviewed. Any recent labs, xrays reviewed and medications reconciled. Is having increasing urinary frequency.  Hypertension  This is a chronic problem. The current episode started more than 1 year ago. The problem is unchanged. The problem is controlled. Pertinent negatives include no neck pain or palpitations. There are no associated agents to hypertension. Risk factors for coronary artery disease include dyslipidemia and smoking/tobacco exposure. Current antihypertension treatment includes ACE inhibitors. The current treatment provides significant improvement. There are no compliance problems.    Urinary Frequency   This is a chronic problem. The current episode started more than 1 year ago. The problem occurs every urination. The problem has been unchanged. There has been no fever. Associated symptoms include frequency. He has tried nothing for the symptoms.   COPD  There is no wheezing. This is a chronic problem. The current episode started more than 1 year ago. The problem occurs constantly. The problem has been unchanged. Pertinent negatives include no ear pain or sneezing. His symptoms are aggravated by smoking.   Back Pain  This is a chronic problem. The current episode started more than 1 year ago. The problem occurs intermittently. The problem is unchanged. The pain is present in the lumbar spine. The pain is moderate. The pain is Worse during the day. The symptoms are aggravated by bending and standing. Stiffness is present In the morning. He has tried analgesics for the symptoms. The treatment provided moderate relief.    The following portions of the patient's history were reviewed and updated as appropriate: allergies, current medications, past social history and problem  "list.    Outpatient Medications Prior to Visit   Medication Sig Dispense Refill    aspirin 81 MG chewable tablet Chew 1 tablet Daily.      clotrimazole-betamethasone (LOTRISONE) 1-0.05 % cream APPLY TO AFFECTED AREA(S) AS DIRECTED TWICE DAILY 45 g 1    Multiple Vitamins-Minerals (VISION-HECTOR PRESERVE PO) Take 1 tablet by mouth 2 (Two) Times a Day. PreserVision Lutein 226 mg-200 unit-5 mg-0.8 mg capsule      triamcinolone (KENALOG) 0.1 % cream Apply  topically to the appropriate area as directed 2 (Two) Times a Day. Sparingly to rash 80 g 6    HYDROcodone-acetaminophen (NORCO) 7.5-325 MG per tablet TAKE 1 TABLET BY MOUTH EVERY SIX HOURS AS NEEDED FOR MODERATE PAIN 40 tablet 0    latanoprost (XALATAN) 0.005 % ophthalmic solution Administer 1 drop into the left eye Every Night.      lisinopril (PRINIVIL,ZESTRIL) 40 MG tablet Take 1 tablet by mouth Daily. 90 tablet 1    simvastatin (ZOCOR) 20 MG tablet Take 1 tablet by mouth every night at bedtime. 90 tablet 1     No facility-administered medications prior to visit.       Review of Systems   HENT:  Negative for ear pain and sneezing.    Respiratory:  Negative for wheezing.    Cardiovascular:  Negative for palpitations.   Genitourinary:  Positive for frequency.   Musculoskeletal:  Positive for back pain. Negative for neck pain.   I have reviewed 12 systems with patient. Findings were negative except what is noted below and/or in history of present illness.     Objective   Visit Vitals  /72   Pulse 78   Ht 170.2 cm (67\")   Wt 68 kg (150 lb)   SpO2 97%   BMI 23.49 kg/m²     Physical Exam  Vitals and nursing note reviewed.   Constitutional:       General: He is not in acute distress.     Appearance: He is well-developed.   HENT:      Head: Normocephalic.      Nose: Nose normal.   Eyes:      General:         Right eye: No discharge.         Left eye: No discharge.      Conjunctiva/sclera: Conjunctivae normal.      Pupils: Pupils are equal, round, and reactive to light. "   Neck:      Thyroid: No thyromegaly.   Cardiovascular:      Rate and Rhythm: Normal rate and regular rhythm.      Heart sounds: Normal heart sounds. No murmur heard.  Pulmonary:      Effort: Pulmonary effort is normal.      Breath sounds: Normal breath sounds.   Lymphadenopathy:      Cervical: No cervical adenopathy.   Skin:     General: Skin is warm and dry.   Neurological:      Mental Status: He is alert and oriented to person, place, and time.     Notes brought forward are reviewed and updated if indicated.     Assessment & Plan   Problems Addressed this Visit          Cardiac and Vasculature    Essential hypertension (Chronic)    Relevant Medications    lisinopril (PRINIVIL,ZESTRIL) 40 MG tablet    Other Relevant Orders    Basic Metabolic Panel    CBC & Differential    Comprehensive Metabolic Panel    Lipid Panel    Urinalysis With Culture If Indicated - Urine, Clean Catch    Hyperlipidemia (Chronic)    Relevant Medications    simvastatin (ZOCOR) 20 MG tablet    Other Relevant Orders    CBC & Differential    Comprehensive Metabolic Panel    Lipid Panel    Urinalysis With Culture If Indicated - Urine, Clean Catch       Pulmonary and Pneumonias    Emphysema of lung (Chronic)     Other Visit Diagnoses       Urinary frequency    -  Primary    Relevant Orders    Urinalysis With Culture If Indicated - (Completed)    Basic Metabolic Panel    Urinalysis, Microscopic Only - Urine, Clean Catch (Completed)    Urine Culture - Urine, Urine, Clean Catch    Benign prostatic hyperplasia with urinary frequency        Relevant Medications    tamsulosin (FLOMAX) 0.4 MG capsule 24 hr capsule    Chronic midline low back pain without sciatica        Relevant Medications    HYDROcodone-acetaminophen (NORCO) 7.5-325 MG per tablet          Diagnoses         Codes Comments    Urinary frequency    -  Primary ICD-10-CM: R35.0  ICD-9-CM: 788.41     Essential hypertension     ICD-10-CM: I10  ICD-9-CM: 401.9     Benign prostatic hyperplasia  with urinary frequency     ICD-10-CM: N40.1, R35.0  ICD-9-CM: 600.01, 788.41     Mixed hyperlipidemia     ICD-10-CM: E78.2  ICD-9-CM: 272.2     Chronic midline low back pain without sciatica     ICD-10-CM: M54.50, G89.29  ICD-9-CM: 724.2, 338.29     Panlobular emphysema     ICD-10-CM: J43.1  ICD-9-CM: 492.8            Will notify regarding results. Start flomax. Continue current medications. Smith reviewed and appropriate.     New Medications Ordered This Visit   Medications    tamsulosin (FLOMAX) 0.4 MG capsule 24 hr capsule     Sig: Take 1 capsule by mouth Every Night. For urine problem     Dispense:  90 capsule     Refill:  3    simvastatin (ZOCOR) 20 MG tablet     Sig: Take 1 tablet by mouth every night at bedtime.     Dispense:  90 tablet     Refill:  1    lisinopril (PRINIVIL,ZESTRIL) 40 MG tablet     Sig: Take 1 tablet by mouth Daily.     Dispense:  90 tablet     Refill:  1    latanoprost (XALATAN) 0.005 % ophthalmic solution     Sig: Administer 1 drop into the left eye Every Night.     Dispense:  7.5 mL     Refill:  5    HYDROcodone-acetaminophen (NORCO) 7.5-325 MG per tablet     Sig: Take 1 tablet by mouth Every 6 (Six) Hours As Needed for Moderate Pain.     Dispense:  60 tablet     Refill:  0     Return in about 6 months (around 3/4/2024) for medicare wellness visit.        This document has been electronically signed by Debbi Jackson MD on September 5, 2023 17:10 CDT

## 2023-09-06 LAB
ANION GAP SERPL CALCULATED.3IONS-SCNC: 8.9 MMOL/L (ref 5–15)
BUN SERPL-MCNC: 13 MG/DL (ref 8–23)
BUN/CREAT SERPL: 14.4 (ref 7–25)
CALCIUM SPEC-SCNC: 9.1 MG/DL (ref 8.6–10.5)
CHLORIDE SERPL-SCNC: 94 MMOL/L (ref 98–107)
CO2 SERPL-SCNC: 27.1 MMOL/L (ref 22–29)
CREAT SERPL-MCNC: 0.9 MG/DL (ref 0.76–1.27)
EGFRCR SERPLBLD CKD-EPI 2021: 83.2 ML/MIN/1.73
GLUCOSE SERPL-MCNC: 100 MG/DL (ref 65–99)
POTASSIUM SERPL-SCNC: 4.5 MMOL/L (ref 3.5–5.2)
SODIUM SERPL-SCNC: 130 MMOL/L (ref 136–145)

## 2023-09-07 LAB — BACTERIA SPEC AEROBE CULT: NO GROWTH

## (undated) DEVICE — MICROSURGICAL INSTRUMENT PROVISC CANNULA 27GA THREADED HUB: Brand: ALCON

## (undated) DEVICE — SOL IRR NACL 0.9PCT BT 1000ML

## (undated) DEVICE — GLV SURG SENSICARE GREEN W/ALOE PF LF 6.5 STRL

## (undated) DEVICE — 30° KELMAN®, 0.9 MM TURBOSONICS® ABS® MINI TIP: Brand: ALCON, KELMAN, TURBOSONICS, ABS

## (undated) DEVICE — DRSNG GZ CURAD XEROFORM NONADHS 5X9IN STRL

## (undated) DEVICE — GLV SURG SENSICARE GREEN W/ALOE PF LF 6 STRL

## (undated) DEVICE — Device

## (undated) DEVICE — GOWN,AURORA,NOREINF,RAGLAN,XL,STERILE: Brand: MEDLINE

## (undated) DEVICE — SOL IRR H2O BTL 1000ML STRL

## (undated) DEVICE — ALCON INTREPID CURVED 0.3MM POLYMER I/A TIP: Brand: ALCON, INTREPID

## (undated) DEVICE — OPHTHALMIC KNIFE 15°: Brand: ALCON

## (undated) DEVICE — SYR LL 3CC

## (undated) DEVICE — SUT VIC 0 CT1 36IN J946H

## (undated) DEVICE — GLV SURG SENSICARE PI LF PF 7.5 GRN STRL

## (undated) DEVICE — PAD,ABDOMINAL,8"X10",ST,LF: Brand: MEDLINE

## (undated) DEVICE — GLV SURG TRIUMPH ORTHO W/ALOE PF LTX 7.0

## (undated) DEVICE — GLV SURG SENSICARE MICRO PF LF 7.5 STRL

## (undated) DEVICE — STERILE POLYISOPRENE POWDER-FREE SURGICAL GLOVES WITH EMOLLIENT COATING: Brand: PROTEXIS

## (undated) DEVICE — THE MONARCH® "D" CARTRIDGE IS A SINGLE-USE POLYPROPYLENE CARTRIDGE FOR POSTERIOR CHAMBER IOL DELIVERY: Brand: MONARCH® III

## (undated) DEVICE — GLV SURG TRIUMPH LT PF LTX 6 STRL

## (undated) DEVICE — CLEARCUT® SLIT KNIFE INTREPID MICRO-COAXIAL SYSTEM 2.4 SB: Brand: CLEARCUT®; INTREPID

## (undated) DEVICE — GLV SURG SENSICARE PI LF PF 8 GRN STRL

## (undated) DEVICE — STPLR SKIN VISISTAT WD 35CT

## (undated) DEVICE — GLV SURG SENSICARE POLYISPRN W/ALOE PF LF 6.5 GRN STRL

## (undated) DEVICE — GLV SURG TRIUMPH LT PF LTX 7.5 STRL

## (undated) DEVICE — PK HIP LF 60

## (undated) DEVICE — 3M™ IOBAN™ 2 ANTIMICROBIAL INCISE DRAPE 6651EZ: Brand: IOBAN™ 2

## (undated) DEVICE — GLV SURG TRIUMPH LT PF LTX 8 STRL

## (undated) DEVICE — SUT VIC 2 CP 27IN UD VCP195H

## (undated) DEVICE — TOWEL,OR,DSP,ST,BLUE,DLX,8/PK,10PK/CS: Brand: MEDLINE

## (undated) DEVICE — GW FOR TROCH NAIL 3.2X400MM